# Patient Record
Sex: MALE | URBAN - METROPOLITAN AREA
[De-identification: names, ages, dates, MRNs, and addresses within clinical notes are randomized per-mention and may not be internally consistent; named-entity substitution may affect disease eponyms.]

---

## 2024-06-25 ENCOUNTER — TELEPHONE (OUTPATIENT)
Dept: TRANSPLANT | Facility: CLINIC | Age: 24
End: 2024-06-25
Payer: MEDICAID

## 2024-06-25 DIAGNOSIS — I50.9 CONGESTIVE HEART FAILURE, UNSPECIFIED HF CHRONICITY, UNSPECIFIED HEART FAILURE TYPE: Primary | ICD-10-CM

## 2024-07-17 ENCOUNTER — TELEPHONE (OUTPATIENT)
Dept: TRANSPLANT | Facility: CLINIC | Age: 24
End: 2024-07-17
Payer: MEDICAID

## 2024-07-23 ENCOUNTER — HOSPITAL ENCOUNTER (OUTPATIENT)
Dept: PULMONOLOGY | Facility: CLINIC | Age: 24
Discharge: HOME | End: 2024-07-23
Payer: MEDICAID

## 2024-07-23 ENCOUNTER — OFFICE VISIT (OUTPATIENT)
Dept: TRANSPLANT | Facility: CLINIC | Age: 24
End: 2024-07-23
Payer: MEDICAID

## 2024-07-23 ENCOUNTER — CLINICAL SUPPORT (OUTPATIENT)
Dept: TRANSPLANT | Facility: CLINIC | Age: 24
End: 2024-07-23
Payer: MEDICAID

## 2024-07-23 ENCOUNTER — HOSPITAL ENCOUNTER (OUTPATIENT)
Dept: CARDIOLOGY | Facility: HOSPITAL | Age: 24
Discharge: HOME | End: 2024-07-23
Attending: INTERNAL MEDICINE
Payer: MEDICAID

## 2024-07-23 VITALS — HEIGHT: 70 IN | BODY MASS INDEX: 38.65 KG/M2 | WEIGHT: 270 LBS

## 2024-07-23 VITALS — WEIGHT: 270 LBS | BODY MASS INDEX: 38.65 KG/M2 | HEIGHT: 70 IN

## 2024-07-23 VITALS
HEART RATE: 80 BPM | SYSTOLIC BLOOD PRESSURE: 135 MMHG | WEIGHT: 315 LBS | BODY MASS INDEX: 45.1 KG/M2 | HEIGHT: 70 IN | OXYGEN SATURATION: 99 % | DIASTOLIC BLOOD PRESSURE: 80 MMHG

## 2024-07-23 DIAGNOSIS — I50.9 CONGESTIVE HEART FAILURE, UNSPECIFIED HF CHRONICITY, UNSPECIFIED HEART FAILURE TYPE: ICD-10-CM

## 2024-07-23 DIAGNOSIS — E66.01 MORBID OBESITY: ICD-10-CM

## 2024-07-23 DIAGNOSIS — I50.9 CONGESTIVE HEART FAILURE, UNSPECIFIED HF CHRONICITY, UNSPECIFIED HEART FAILURE TYPE: Primary | ICD-10-CM

## 2024-07-23 DIAGNOSIS — I50.43 ACUTE ON CHRONIC COMBINED SYSTOLIC AND DIASTOLIC CONGESTIVE HEART FAILURE: Primary | ICD-10-CM

## 2024-07-23 LAB
ASCENDING AORTA: 2.97 CM
AV INDEX (PROSTH): 0.7
AV MEAN GRADIENT: 3 MMHG
AV PEAK GRADIENT: 4 MMHG
AV VALVE AREA BY VELOCITY RATIO: 4.8 CM²
AV VALVE AREA: 4.32 CM²
AV VELOCITY RATIO: 0.78
BSA FOR ECHO PROCEDURE: 2.46 M2
CV ECHO LV RWT: 0.25 CM
DOP CALC AO PEAK VEL: 1 M/S
DOP CALC AO VTI: 19.22 CM
DOP CALC LVOT AREA: 6.2 CM2
DOP CALC LVOT DIAMETER: 2.8 CM
DOP CALC LVOT PEAK VEL: 0.78 M/S
DOP CALC LVOT STROKE VOLUME: 83.02 CM3
DOP CALCLVOT PEAK VEL VTI: 13.49 CM
E WAVE DECELERATION TIME: 194.12 MSEC
E/A RATIO: 1.28
E/E' RATIO: 14.6 M/S
ECHO LV POSTERIOR WALL: 0.98 CM (ref 0.6–1.1)
EJECTION FRACTION: 15 %
FRACTIONAL SHORTENING: 9 % (ref 28–44)
INTERVENTRICULAR SEPTUM: 0.81 CM (ref 0.6–1.1)
LA MAJOR: 6.31 CM
LA MINOR: 6.2 CM
LA WIDTH: 5.55 CM
LEFT ATRIUM SIZE: 5.08 CM
LEFT ATRIUM VOLUME INDEX MOD: 57.8 ML/M2
LEFT ATRIUM VOLUME INDEX: 63.2 ML/M2
LEFT ATRIUM VOLUME MOD: 137.04 CM3
LEFT ATRIUM VOLUME: 149.89 CM3
LEFT INTERNAL DIMENSION IN SYSTOLE: 7.2 CM (ref 2.1–4)
LEFT VENTRICLE DIASTOLIC VOLUME INDEX: 142.43 ML/M2
LEFT VENTRICLE DIASTOLIC VOLUME: 337.56 ML
LEFT VENTRICLE MASS INDEX: 148 G/M2
LEFT VENTRICLE SYSTOLIC VOLUME INDEX: 114.9 ML/M2
LEFT VENTRICLE SYSTOLIC VOLUME: 272.43 ML
LEFT VENTRICULAR INTERNAL DIMENSION IN DIASTOLE: 7.93 CM (ref 3.5–6)
LEFT VENTRICULAR MASS: 349.75 G
LV LATERAL E/E' RATIO: 18.25 M/S
LV SEPTAL E/E' RATIO: 12.17 M/S
MV PEAK A VEL: 0.57 M/S
MV PEAK E VEL: 0.73 M/S
PISA TR MAX VEL: 3.22 M/S
RA MAJOR: 5.21 CM
RA PRESSURE ESTIMATED: 3 MMHG
RA WIDTH: 4.52 CM
RIGHT VENTRICLE DIASTOLIC BASEL DIMENSION: 4.5 CM
RV TB RVSP: 6 MMHG
SINUS: 3.68 CM
STJ: 3.02 CM
TDI LATERAL: 0.04 M/S
TDI SEPTAL: 0.06 M/S
TDI: 0.05 M/S
TR MAX PG: 41 MMHG
TRICUSPID ANNULAR PLANE SYSTOLIC EXCURSION: 1.7 CM
TV REST PULMONARY ARTERY PRESSURE: 44 MMHG
Z-SCORE OF LEFT VENTRICULAR DIMENSION IN END DIASTOLE: -2.56
Z-SCORE OF LEFT VENTRICULAR DIMENSION IN END SYSTOLE: 1.25

## 2024-07-23 PROCEDURE — 93306 TTE W/DOPPLER COMPLETE: CPT | Mod: 26,NTX,, | Performed by: INTERNAL MEDICINE

## 2024-07-23 PROCEDURE — 3008F BODY MASS INDEX DOCD: CPT | Mod: CPTII,,, | Performed by: INTERNAL MEDICINE

## 2024-07-23 PROCEDURE — 99999 PR PBB SHADOW E&M-EST. PATIENT-LVL III: CPT | Mod: PBBFAC,TXP,, | Performed by: INTERNAL MEDICINE

## 2024-07-23 PROCEDURE — 99213 OFFICE O/P EST LOW 20 MIN: CPT | Mod: PBBFAC,25,TXP | Performed by: INTERNAL MEDICINE

## 2024-07-23 PROCEDURE — 94618 PULMONARY STRESS TESTING: CPT | Mod: 26,S$PBB,, | Performed by: INTERNAL MEDICINE

## 2024-07-23 PROCEDURE — 1160F RVW MEDS BY RX/DR IN RCRD: CPT | Mod: CPTII,,, | Performed by: INTERNAL MEDICINE

## 2024-07-23 PROCEDURE — 3079F DIAST BP 80-89 MM HG: CPT | Mod: CPTII,,, | Performed by: INTERNAL MEDICINE

## 2024-07-23 PROCEDURE — 4010F ACE/ARB THERAPY RXD/TAKEN: CPT | Mod: CPTII,,, | Performed by: INTERNAL MEDICINE

## 2024-07-23 PROCEDURE — C8929 TTE W OR WO FOL WCON,DOPPLER: HCPCS | Mod: TXP

## 2024-07-23 PROCEDURE — 1159F MED LIST DOCD IN RCRD: CPT | Mod: CPTII,,, | Performed by: INTERNAL MEDICINE

## 2024-07-23 PROCEDURE — 3075F SYST BP GE 130 - 139MM HG: CPT | Mod: CPTII,,, | Performed by: INTERNAL MEDICINE

## 2024-07-23 PROCEDURE — 94618 PULMONARY STRESS TESTING: CPT | Mod: PBBFAC | Performed by: INTERNAL MEDICINE

## 2024-07-23 PROCEDURE — 25500020 PHARM REV CODE 255: Mod: TXP | Performed by: INTERNAL MEDICINE

## 2024-07-23 PROCEDURE — 99214 OFFICE O/P EST MOD 30 MIN: CPT | Mod: S$PBB,,, | Performed by: INTERNAL MEDICINE

## 2024-07-23 RX ORDER — TRAZODONE HYDROCHLORIDE 50 MG/1
50 TABLET ORAL NIGHTLY PRN
COMMUNITY

## 2024-07-23 RX ORDER — POTASSIUM CHLORIDE 750 MG/1
10 TABLET, EXTENDED RELEASE ORAL DAILY
COMMUNITY
Start: 2024-02-08

## 2024-07-23 RX ORDER — BENZONATATE 100 MG/1
100 CAPSULE ORAL 3 TIMES DAILY PRN
COMMUNITY
Start: 2024-02-01

## 2024-07-23 RX ORDER — AMLODIPINE BESYLATE 10 MG/1
10 TABLET ORAL DAILY
COMMUNITY
Start: 2024-06-24

## 2024-07-23 RX ORDER — NITROGLYCERIN 0.4 MG/1
0.4 TABLET SUBLINGUAL EVERY 5 MIN PRN
COMMUNITY
Start: 2024-06-24

## 2024-07-23 RX ORDER — DAPAGLIFLOZIN 10 MG/1
10 TABLET, FILM COATED ORAL DAILY
COMMUNITY

## 2024-07-23 RX ORDER — SACUBITRIL AND VALSARTAN 24; 26 MG/1; MG/1
1 TABLET, FILM COATED ORAL 2 TIMES DAILY
COMMUNITY
Start: 2024-02-08

## 2024-07-23 RX ADMIN — HUMAN ALBUMIN MICROSPHERES AND PERFLUTREN 0.66 MG: 10; .22 INJECTION, SOLUTION INTRAVENOUS at 01:07

## 2024-07-23 NOTE — PROGRESS NOTES
Procedure explained. 22 g sl started in left forearm for optison use. Optison given ivp via sl for imaging by [facility]. Tolerated well. Sl d/frankie after. Pressure applied.

## 2024-07-23 NOTE — PROGRESS NOTES
At Dr Banks's request, met with pt briefly in clinic to provide educational packets for heart transplant and lvad.   Per MD pt is not currently a candidate for advanced options due to BMI 50.  Pt currently follows with DR Gee García and is not on full GDMT.   He will follow in HF section for now.

## 2024-07-23 NOTE — PROCEDURES
Michael Conti is a 24 y.o.  male patient, who presents for a 6 minute walk test ordered by MD Jocelyn.  The diagnosis is Cardiomyopathy; Congestive Heart Failure.  The patient's BMI is 38.7 kg/m2.  Predicted distance (lower limit of normal) is 603.33 meters.      Test Results:    The test was completed without stopping.  The total time walked was 360 seconds.  During walking, the patient reported:  Dyspnea.  The patient used no assistive devices during testing.     07/23/2024---------Distance: 365.76 meters (1200 feet)     O2 Sat % Supplemental Oxygen Heart Rate Blood Pressure Alexa Scale   Pre-exercise  (Resting) 97 % Room Air 91 bpm 145/98 mmHg 3   During Exercise 96 % Room Air 125 bpm 169/110 mmHg 5-6   Post-exercise  (Recovery) 97 % Room Air  95 bpm 149/98 mmHg      Recovery Time: 90 seconds    Performing nurse/tech: Estopinal RRT      PREVIOUS STUDY:   The patient has not had a previous study.      CLINICAL INTERPRETATION:  Six minute walk distance is 365.76 meters (1200 feet) with heavy dyspnea.  During exercise, there was no significant desaturation while breathing room air.  Both blood pressure and heart rate increased significantly with walking.  Hypertension was present prior to exercise.  The patient did not report non-pulmonary symptoms during exercise.  No previous study performed.  Based upon age and body mass index, exercise capacity is less than predicted.

## 2024-07-23 NOTE — LETTER
July 23, 2024        Victorino Reyes  [address] [place] [address]  [place] [address]  Phone: [phone]  Fax: [phone]             [facility]  [address]  [facility] [address]  Phone: [phone]   Patient: Michael Conti   MR Number: [id number]   YOB: 2000   Date of Visit: 7/23/2024       Dear Dr. Victorino Reyes    Thank you for referring Michael Conti to me for evaluation. Attached you will find relevant portions of my assessment and plan of care.    If you have questions, please do not hesitate to call me. I look forward to following Michael Conti along with you.    Sincerely,    Sherice Banks MD    Enclosure    If you would like to receive this communication electronically, please contact [email or web address] or [phone] to request [employer] access.    [employer] is a tool which provides read-only access to select patient information with whom you have a relationship. Its easy to use and provides real time access to review your patients record including encounter summaries, notes, results, and demographic information.    If you feel you have received this communication in error or would no longer like to receive these types of communications, please e-mail [email or web address]

## 2024-07-23 NOTE — PROGRESS NOTES
Subjective:   Initial evaluation of advanced heart failure for advanced options.     HPI:  Mr. Conti is a 24 y.o. year old Black or  male who has presents to be considered for advanced surgical options (LVAD/OHT).  Mr. Conti is referred to us for what we believe is advanced options.    Patient states he was diagnosed with decompensated heart failure approximately 4 years ago, etiology unknown to him. He has had a few admissions over the last year, states he just left the hospital last Thursday due to ADH F, spent 2 days in the hospital.  We unfortunately do not have very many records, there is 1 clinic note from June that states he was up titrated on his Entresto to 97/103, and was still having increased blood pressures.  He states his Entresto was decreased to the lowest dose while in the hospital, 24/26.  Additionally looking at his GDM MT he is currently on Entresto 24/26, Farxiga 10 mg, not on spironolactone nor on a beta-blocker.  He states he sleeps on 1 pillow however then admits that he sleeps in a recliner.  Positive shortness of breath, no leg swelling, main issue remains volume and shortness of breath with exertion.      6 minute walk test today he walked 1200 ft, with no desaturation.   Of note, blood pressure during the 6 minute walk test was quite elevated, starting at 1:45 a.m. systolic and increasing to 169 systolic during exercise.    07/23/2024---------Distance: 365.76 meters (1200 feet)       O2 Sat % Supplemental Oxygen Heart Rate Blood Pressure Alexa Scale   Pre-exercise  (Resting) 97 % Room Air 91 bpm 145/98 mmHg 3   During Exercise 96 % Room Air 125 bpm 169/110 mmHg 5-6   Post-exercise  (Recovery) 97 % Room Air  95 bpm 149/98 mmHg        Recovery Time: 90 seconds     No significant other medical history  No family history of heart failure per patient.  No significant surgical history.    Social history: negative for tobacco, illicits, ETOH. Not working.          Review of  "Systems   Constitutional: Positive for weight gain. Negative for chills, decreased appetite, diaphoresis, fever and malaise/fatigue.   HENT:  Negative for congestion.    Eyes:  Negative for blurred vision and visual disturbance.   Cardiovascular:  Positive for dyspnea on exertion and orthopnea (sleeps in recliner chair). Negative for chest pain, irregular heartbeat, leg swelling, near-syncope, palpitations, paroxysmal nocturnal dyspnea and syncope.   Respiratory:  Positive for sleep disturbances due to breathing and snoring. Negative for cough, shortness of breath and wheezing.    Hematologic/Lymphatic: Negative for bleeding problem. Does not bruise/bleed easily.   Skin:  Negative for poor wound healing and rash.   Musculoskeletal:  Negative for arthritis, joint pain and muscle weakness.   Gastrointestinal:  Negative for bloating, abdominal pain, anorexia, constipation, diarrhea, hematemesis, hematochezia, melena, nausea and vomiting.   Genitourinary:  Negative for frequency and urgency.   Neurological:  Negative for difficulty with concentration, excessive daytime sleepiness, dizziness, headaches, light-headedness and weakness.   Psychiatric/Behavioral:  Negative for depression. The patient does not have insomnia and is not nervous/anxious.        Objective:   Blood pressure 135/80, pulse 80, height 5' 10" (1.778 m), weight (!) 160.3 kg (353 lb 6.4 oz), SpO2 99%.body mass index is 50.71 kg/m².    Physical Exam  Vitals and nursing note reviewed.   Constitutional:       General: He is not in acute distress.     Appearance: He is well-developed. He is not diaphoretic.   HENT:      Head: Normocephalic and atraumatic.   Eyes:      General:         Right eye: No discharge.         Left eye: No discharge.      Conjunctiva/sclera: Conjunctivae normal.   Neck:      Vascular: No JVD.   Cardiovascular:      Rate and Rhythm: Normal rate and regular rhythm.      Heart sounds: No murmur heard.     No friction rub. No gallop. " "  Pulmonary:      Effort: Pulmonary effort is normal.      Breath sounds: Normal breath sounds. No wheezing or rales.   Chest:      Chest wall: No tenderness.   Abdominal:      General: Bowel sounds are normal. There is no distension.      Palpations: Abdomen is soft. There is no mass.      Tenderness: There is no abdominal tenderness. There is no guarding or rebound.   Musculoskeletal:         General: No tenderness. Normal range of motion.      Cervical back: Normal range of motion and neck supple.   Skin:     General: Skin is warm and dry.      Findings: No erythema or rash.   Neurological:      Mental Status: He is alert and oriented to person, place, and time.   Psychiatric:         Behavior: Behavior normal.         Thought Content: Thought content normal.         Judgment: Judgment normal.         Labs:      Chemistry        Component Value Date/Time     07/23/2024 1138    K 4.1 07/23/2024 1138     07/23/2024 1138    CO2 23 07/23/2024 1138    BUN 25 (H) 07/23/2024 1138    CREATININE 1.4 07/23/2024 1138    GLU 80 07/23/2024 1138        Component Value Date/Time    CALCIUM 8.6 (L) 07/23/2024 1138    ALKPHOS 109 07/23/2024 1138    AST 18 07/23/2024 1138    ALT 23 07/23/2024 1138    BILITOT 0.3 07/23/2024 1138          No results found for: "MG"  Lab Results   Component Value Date    WBC 10.04 07/23/2024    HGB 15.6 07/23/2024    HCT 50.1 07/23/2024    MCV 87 07/23/2024     07/23/2024     BNP   Date Value Ref Range Status   07/23/2024 409 (H) 0 - 99 pg/mL Final     Comment:     Values of less than 100 pg/ml are consistent with non-CHF populations.     No results found for this or any previous visit.      Labs were reviewed with the patient.    Assessment:      1. Acute on chronic combined systolic and diastolic congestive heart failure    2. Morbid obesity        Plan:     During the visit today, patient seemed a little bit more concerned with  making sure his transportation was there for him " to leave.  We discussed the fact that his blood pressure is quite elevated today and likely has room to up titrate his GDM MT again.  If unable to do Entresto, without records from the last hospitalization from last week, would strongly recommend Bidil as an alternative if tolerable.      Regarding candidacy for LVAD and OHT, unfortunately, patient's BMI is  over 50 today.  On exam it is difficult to assess volume, however unlikely to bring his BMI down to transplant (BMI 35 or below).      To help his heart failure management, we are able to consider right heart catheterization and CPX, however would much prefer that he be up titrated on his GDM MT. If there are issues with GDM MT up titration, would like to get more records were discussed with his primary team.  Have reached out to Dr. Xie to see if he is familiar with the patient and if not, to see if the patient could see him in his clinic.    Patient is now NYHA II ACC stage C  Recommend 2 gram sodium restriction and 1500cc fluid restriction.  Encourage physical activity with graded exercise program.  Requested patient to weigh themselves daily, and to notify us if their weight increases by more than 3 lbs in 1 day or 5 lbs in 1 week.     Transplant Candidacy: Patient is a 24 y.o. year old male with heart failure is being seen for possible LVAD. In my opinion, he is  an unacceptable LVAD candidate. Patient did meet with MCS and/or pre-transplant coordinator at the end of this visit for workup. he is scheduled for medical optimization prior to risk stratification. The patient will follow up with CHF.    Thank you for allowing us to participate in the cardiovascular management of this patient. We look forward to partnering in their care. Please contact us with any questions or concerns you may have regarding this patient.     Patient and family acknowledged receipt of this information and all questions were answered.     In my opinion, the patient is an  unsuitable LVAD and OHT candidate due to following BMI > 45. The transplant encounter will be closed. The patient will be following in the heart failure section. However would strongly prefer he followup with his local team as we are difficult for him to come to with transportation and may not be able to maintain this. Patient's caregiver was not present this visit.       Patient advised that it is recommended that all transplant candidates, and their close contacts and household members receive Covid vaccination.         Advance Care Planning     Date: 07/23/2024    Power of   I initiated the process of voluntary advance care planning today and explained the importance of this process to the patient.  I introduced the concept of advance directives to the patient, as well. Then the patient received detailed information about the importance of designating a Health Care Power of  (HCPOA). He was also instructed to communicate with this person about their wishes for future healthcare, should he become sick and lose decision-making capacity. The patient has not previously appointed a HCPOA. After our discussion, the patient has not decided to complete a HCPOA and has decided to not fill out today. We talked about it beign important and that he may benefit from having a caregiver at his next visit if he returns. Acknowledged receipt of this information. ,       A total of 5 min was spent on advance care planning, goals of care discussion, emotional support, formulating and communicating prognosis and exploring burden/benefit of various approaches of treatment. This discussion occurred on a fully voluntary basis with the verbal consent of the patient and/or family.       Sherice Banks MD

## 2024-07-25 ENCOUNTER — TELEPHONE (OUTPATIENT)
Dept: TRANSPLANT | Facility: CLINIC | Age: 24
End: 2024-07-25
Payer: MEDICAID

## 2024-07-25 NOTE — TELEPHONE ENCOUNTER
Care of patient is being transferred to CHF section from Preht section, per Dr. Banks.    Dx:NICM  Reason: BMI>45  Pt is not on home inotrope therapy.      Outstanding orders scheduled:    Future Appointments   Date Time Provider Department Center   9/11/2024  9:50 AM LAB, APPOINTMENT [place] [facility]   9/11/2024 10:30 AM CPX [facility]   [facility]11[facility]2024 11[facility]

## 2024-08-12 ENCOUNTER — TELEPHONE (OUTPATIENT)
Dept: TRANSPLANT | Facility: CLINIC | Age: 24
End: 2024-08-12
Payer: MEDICAID

## 2024-09-10 ENCOUNTER — TELEPHONE (OUTPATIENT)
Dept: CARDIOLOGY | Facility: HOSPITAL | Age: 24
End: 2024-09-10
Payer: MEDICAID

## 2024-09-10 NOTE — TELEPHONE ENCOUNTER
Called to notify of rescheduling for CPX due to anticipated weather on 9/11. Ordering provider office to reschedule at a future date.

## 2024-09-12 ENCOUNTER — HOSPITAL ENCOUNTER (INPATIENT)
Facility: HOSPITAL | Age: 24
LOS: 2 days | Discharge: HOME | DRG: 280 | End: 2024-09-14
Attending: EMERGENCY MEDICINE | Admitting: INTERNAL MEDICINE
Payer: MEDICAID

## 2024-09-12 DIAGNOSIS — R06.02 SHORTNESS OF BREATH: ICD-10-CM

## 2024-09-12 DIAGNOSIS — R07.9 CHEST PAIN: Primary | ICD-10-CM

## 2024-09-12 DIAGNOSIS — R94.31 PROLONGED Q-T INTERVAL ON ECG: ICD-10-CM

## 2024-09-12 DIAGNOSIS — I50.9 CHF EXACERBATION: ICD-10-CM

## 2024-09-12 DIAGNOSIS — I50.9 CHF (CONGESTIVE HEART FAILURE): ICD-10-CM

## 2024-09-12 LAB
ALBUMIN SERPL-MCNC: 3.5 G/DL (ref 3.5–5)
ALBUMIN/GLOB SERPL: 1.1 RATIO (ref 1.1–2)
ALP SERPL-CCNC: 89 UNIT/L (ref 40–150)
ALT SERPL-CCNC: 31 UNIT/L (ref 0–55)
ANION GAP SERPL CALC-SCNC: 11 MEQ/L
AST SERPL-CCNC: 29 UNIT/L (ref 5–34)
BASOPHILS # BLD AUTO: 0.04 X10(3)/MCL
BASOPHILS NFR BLD AUTO: 0.4 %
BILIRUB SERPL-MCNC: 0.7 MG/DL
BNP BLD-MCNC: 3367.3 PG/ML
BUN SERPL-MCNC: 22.8 MG/DL (ref 8.9–20.6)
CALCIUM SERPL-MCNC: 9.2 MG/DL (ref 8.4–10.2)
CHLORIDE SERPL-SCNC: 108 MMOL/L (ref 98–107)
CO2 SERPL-SCNC: 22 MMOL/L (ref 22–29)
CREAT SERPL-MCNC: 2.05 MG/DL (ref 0.73–1.18)
CREAT/UREA NIT SERPL: 11
EOSINOPHIL # BLD AUTO: 0.03 X10(3)/MCL (ref 0–0.9)
EOSINOPHIL NFR BLD AUTO: 0.3 %
ERYTHROCYTE [DISTWIDTH] IN BLOOD BY AUTOMATED COUNT: 15.3 % (ref 11.5–17)
GFR SERPLBLD CREATININE-BSD FMLA CKD-EPI: 46 ML/MIN/1.73/M2
GLOBULIN SER-MCNC: 3.3 GM/DL (ref 2.4–3.5)
GLUCOSE SERPL-MCNC: 92 MG/DL (ref 74–100)
HCT VFR BLD AUTO: 42.2 % (ref 42–52)
HGB BLD-MCNC: 13.6 G/DL (ref 14–18)
IMM GRANULOCYTES # BLD AUTO: 0.05 X10(3)/MCL (ref 0–0.04)
IMM GRANULOCYTES NFR BLD AUTO: 0.5 %
LIPASE SERPL-CCNC: 14 U/L
LYMPHOCYTES # BLD AUTO: 2.77 X10(3)/MCL (ref 0.6–4.6)
LYMPHOCYTES NFR BLD AUTO: 25.6 %
MCH RBC QN AUTO: 27.1 PG (ref 27–31)
MCHC RBC AUTO-ENTMCNC: 32.2 G/DL (ref 33–36)
MCV RBC AUTO: 84.1 FL (ref 80–94)
MONOCYTES # BLD AUTO: 0.82 X10(3)/MCL (ref 0.1–1.3)
MONOCYTES NFR BLD AUTO: 7.6 %
NEUTROPHILS # BLD AUTO: 7.1 X10(3)/MCL (ref 2.1–9.2)
NEUTROPHILS NFR BLD AUTO: 65.6 %
NRBC BLD AUTO-RTO: 0 %
OHS QRS DURATION: 90 MS
OHS QTC CALCULATION: 526 MS
PLATELET # BLD AUTO: 243 X10(3)/MCL (ref 130–400)
PMV BLD AUTO: 9.8 FL (ref 7.4–10.4)
POTASSIUM SERPL-SCNC: 4.2 MMOL/L (ref 3.5–5.1)
PROT SERPL-MCNC: 6.8 GM/DL (ref 6.4–8.3)
RBC # BLD AUTO: 5.02 X10(6)/MCL (ref 4.7–6.1)
SODIUM SERPL-SCNC: 141 MMOL/L (ref 136–145)
TROPONIN I SERPL-MCNC: 0.23 NG/ML (ref 0–0.04)
WBC # BLD AUTO: 10.81 X10(3)/MCL (ref 4.5–11.5)

## 2024-09-12 PROCEDURE — 84484 ASSAY OF TROPONIN QUANT: CPT | Performed by: EMERGENCY MEDICINE

## 2024-09-12 PROCEDURE — 83880 ASSAY OF NATRIURETIC PEPTIDE: CPT | Performed by: EMERGENCY MEDICINE

## 2024-09-12 PROCEDURE — 11000001 HC ACUTE MED/SURG PRIVATE ROOM

## 2024-09-12 PROCEDURE — 63600175 PHARM REV CODE 636 W HCPCS: Performed by: EMERGENCY MEDICINE

## 2024-09-12 PROCEDURE — 80053 COMPREHEN METABOLIC PANEL: CPT | Performed by: EMERGENCY MEDICINE

## 2024-09-12 PROCEDURE — 25000003 PHARM REV CODE 250: Performed by: EMERGENCY MEDICINE

## 2024-09-12 PROCEDURE — 21400001 HC TELEMETRY ROOM

## 2024-09-12 PROCEDURE — 96374 THER/PROPH/DIAG INJ IV PUSH: CPT

## 2024-09-12 PROCEDURE — 85025 COMPLETE CBC W/AUTO DIFF WBC: CPT | Performed by: EMERGENCY MEDICINE

## 2024-09-12 PROCEDURE — 93010 ELECTROCARDIOGRAM REPORT: CPT | Mod: ,,, | Performed by: INTERNAL MEDICINE

## 2024-09-12 PROCEDURE — 93005 ELECTROCARDIOGRAM TRACING: CPT

## 2024-09-12 PROCEDURE — 83690 ASSAY OF LIPASE: CPT | Performed by: EMERGENCY MEDICINE

## 2024-09-12 PROCEDURE — 99285 EMERGENCY DEPT VISIT HI MDM: CPT | Mod: 25

## 2024-09-12 RX ORDER — ASPIRIN 325 MG
325 TABLET, DELAYED RELEASE (ENTERIC COATED) ORAL
Status: COMPLETED | OUTPATIENT
Start: 2024-09-12 | End: 2024-09-12

## 2024-09-12 RX ORDER — HYDRALAZINE HYDROCHLORIDE 20 MG/ML
10 INJECTION INTRAMUSCULAR; INTRAVENOUS
Status: DISCONTINUED | OUTPATIENT
Start: 2024-09-12 | End: 2024-09-14 | Stop reason: HOSPADM

## 2024-09-12 RX ORDER — FUROSEMIDE 10 MG/ML
60 INJECTION INTRAMUSCULAR; INTRAVENOUS
Status: COMPLETED | OUTPATIENT
Start: 2024-09-12 | End: 2024-09-12

## 2024-09-12 RX ADMIN — ASPIRIN 325 MG: 325 TABLET, COATED ORAL at 09:09

## 2024-09-12 RX ADMIN — ENOXAPARIN SODIUM 170 MG: 120 INJECTION SUBCUTANEOUS at 11:09

## 2024-09-12 RX ADMIN — FUROSEMIDE 60 MG: 10 INJECTION, SOLUTION INTRAMUSCULAR; INTRAVENOUS at 09:09

## 2024-09-13 LAB
ALBUMIN SERPL-MCNC: 3.7 G/DL (ref 3.5–5)
ALBUMIN/GLOB SERPL: 1 RATIO (ref 1.1–2)
ALP SERPL-CCNC: 89 UNIT/L (ref 40–150)
ALT SERPL-CCNC: 30 UNIT/L (ref 0–55)
ANION GAP SERPL CALC-SCNC: 10 MEQ/L
APICAL FOUR CHAMBER EJECTION FRACTION: 22 %
APICAL TWO CHAMBER EJECTION FRACTION: 14 %
AST SERPL-CCNC: 32 UNIT/L (ref 5–34)
AV INDEX (PROSTH): 0.36
AV MEAN GRADIENT: 2 MMHG
AV PEAK GRADIENT: 3 MMHG
AV VALVE AREA BY VELOCITY RATIO: 2.12 CM²
AV VALVE AREA: 1.89 CM²
AV VELOCITY RATIO: 0.4
BASOPHILS # BLD AUTO: 0.06 X10(3)/MCL
BASOPHILS NFR BLD AUTO: 0.6 %
BILIRUB SERPL-MCNC: 0.6 MG/DL
BSA FOR ECHO PROCEDURE: 2.88 M2
BUN SERPL-MCNC: 25.8 MG/DL (ref 8.9–20.6)
CALCIUM SERPL-MCNC: 9.1 MG/DL (ref 8.4–10.2)
CHLORIDE SERPL-SCNC: 106 MMOL/L (ref 98–107)
CO2 SERPL-SCNC: 23 MMOL/L (ref 22–29)
CREAT SERPL-MCNC: 1.92 MG/DL (ref 0.73–1.18)
CREAT/UREA NIT SERPL: 13
CV ECHO LV RWT: 0.45 CM
DOP CALC AO PEAK VEL: 0.85 M/S
DOP CALC AO VTI: 14.3 CM
DOP CALC LVOT AREA: 5.3 CM2
DOP CALC LVOT DIAMETER: 2.6 CM
DOP CALC LVOT PEAK VEL: 0.34 M/S
DOP CALC LVOT STROKE VOLUME: 27.06 CM3
DOP CALC MV VTI: 17.7 CM
DOP CALCLVOT PEAK VEL VTI: 5.1 CM
E WAVE DECELERATION TIME: 128 MSEC
E/A RATIO: 1.67
E/E' RATIO: 22 M/S
ECHO LV POSTERIOR WALL: 1.47 CM (ref 0.6–1.1)
EOSINOPHIL # BLD AUTO: 0.03 X10(3)/MCL (ref 0–0.9)
EOSINOPHIL NFR BLD AUTO: 0.3 %
ERYTHROCYTE [DISTWIDTH] IN BLOOD BY AUTOMATED COUNT: 15 % (ref 11.5–17)
FRACTIONAL SHORTENING: 6 % (ref 28–44)
GFR SERPLBLD CREATININE-BSD FMLA CKD-EPI: 49 ML/MIN/1.73/M2
GLOBULIN SER-MCNC: 3.6 GM/DL (ref 2.4–3.5)
GLUCOSE SERPL-MCNC: 92 MG/DL (ref 74–100)
HCT VFR BLD AUTO: 43.5 % (ref 42–52)
HGB BLD-MCNC: 13.8 G/DL (ref 14–18)
IMM GRANULOCYTES # BLD AUTO: 0.04 X10(3)/MCL (ref 0–0.04)
IMM GRANULOCYTES NFR BLD AUTO: 0.4 %
INTERVENTRICULAR SEPTUM: 1.42 CM (ref 0.6–1.1)
LACTATE SERPL-SCNC: 1.6 MMOL/L (ref 0.5–2.2)
LEFT ATRIUM AREA SYSTOLIC (APICAL 2 CHAMBER): 34.4 CM2
LEFT ATRIUM AREA SYSTOLIC (APICAL 4 CHAMBER): 26.3 CM2
LEFT ATRIUM SIZE: 5.8 CM
LEFT ATRIUM VOLUME INDEX MOD: 37.3 ML/M2
LEFT ATRIUM VOLUME MOD: 101 CM3
LEFT INTERNAL DIMENSION IN SYSTOLE: 6.1 CM (ref 2.1–4)
LEFT VENTRICLE DIASTOLIC VOLUME INDEX: 80.26 ML/M2
LEFT VENTRICLE DIASTOLIC VOLUME: 217.51 ML
LEFT VENTRICLE END DIASTOLIC VOLUME APICAL 2 CHAMBER: 308 ML
LEFT VENTRICLE END DIASTOLIC VOLUME APICAL 4 CHAMBER: 344 ML
LEFT VENTRICLE END SYSTOLIC VOLUME APICAL 2 CHAMBER: 132 ML
LEFT VENTRICLE END SYSTOLIC VOLUME APICAL 4 CHAMBER: 76.3 ML
LEFT VENTRICLE MASS INDEX: 171 G/M2
LEFT VENTRICLE SYSTOLIC VOLUME INDEX: 69 ML/M2
LEFT VENTRICLE SYSTOLIC VOLUME: 186.93 ML
LEFT VENTRICULAR INTERNAL DIMENSION IN DIASTOLE: 6.52 CM (ref 3.5–6)
LEFT VENTRICULAR MASS: 463.25 G
LV LATERAL E/E' RATIO: 15.4 M/S
LV SEPTAL E/E' RATIO: 38.5 M/S
LVED V (TEICH): 217.51 ML
LVES V (TEICH): 186.93 ML
LVOT MG: 0 MMHG
LVOT MV: 0.22 CM/S
LYMPHOCYTES # BLD AUTO: 3 X10(3)/MCL (ref 0.6–4.6)
LYMPHOCYTES NFR BLD AUTO: 29.4 %
MAGNESIUM SERPL-MCNC: 2 MG/DL (ref 1.6–2.6)
MCH RBC QN AUTO: 27 PG (ref 27–31)
MCHC RBC AUTO-ENTMCNC: 31.7 G/DL (ref 33–36)
MCV RBC AUTO: 85 FL (ref 80–94)
MONOCYTES # BLD AUTO: 0.73 X10(3)/MCL (ref 0.1–1.3)
MONOCYTES NFR BLD AUTO: 7.1 %
MV MEAN GRADIENT: 3 MMHG
MV PEAK A VEL: 0.46 M/S
MV PEAK E VEL: 0.77 M/S
MV PEAK GRADIENT: 4 MMHG
MV STENOSIS PRESSURE HALF TIME: 67 MS
MV VALVE AREA BY CONTINUITY EQUATION: 1.53 CM2
MV VALVE AREA P 1/2 METHOD: 3.28 CM2
NEUTROPHILS # BLD AUTO: 6.36 X10(3)/MCL (ref 2.1–9.2)
NEUTROPHILS NFR BLD AUTO: 62.2 %
NRBC BLD AUTO-RTO: 0 %
OHS CV RV/LV RATIO: 0.53 CM
OHS LV EJECTION FRACTION SIMPSONS BIPLANE MOD: 18 %
OHS QRS DURATION: 86 MS
OHS QRS DURATION: 94 MS
OHS QTC CALCULATION: 500 MS
OHS QTC CALCULATION: 513 MS
PHOSPHATE SERPL-MCNC: 5.5 MG/DL (ref 2.3–4.7)
PISA TR MAX VEL: 2.25 M/S
PLATELET # BLD AUTO: 269 X10(3)/MCL (ref 130–400)
PMV BLD AUTO: 9.9 FL (ref 7.4–10.4)
POTASSIUM SERPL-SCNC: 4.6 MMOL/L (ref 3.5–5.1)
PROT SERPL-MCNC: 7.3 GM/DL (ref 6.4–8.3)
PV PEAK GRADIENT: 1 MMHG
PV PEAK VELOCITY: 0.58 M/S
RA PRESSURE ESTIMATED: 15 MMHG
RBC # BLD AUTO: 5.12 X10(6)/MCL (ref 4.7–6.1)
RIGHT VENTRICULAR END-DIASTOLIC DIMENSION: 3.43 CM
RV TB RVSP: 17 MMHG
SODIUM SERPL-SCNC: 139 MMOL/L (ref 136–145)
TDI LATERAL: 0.05 M/S
TDI SEPTAL: 0.02 M/S
TDI: 0.04 M/S
TR MAX PG: 20 MMHG
TRICUSPID ANNULAR PLANE SYSTOLIC EXCURSION: 1.6 CM
TROPONIN I SERPL-MCNC: 0.56 NG/ML (ref 0–0.04)
TROPONIN I SERPL-MCNC: 0.6 NG/ML (ref 0–0.04)
TROPONIN I SERPL-MCNC: 0.67 NG/ML (ref 0–0.04)
TV REST PULMONARY ARTERY PRESSURE: 35 MMHG
WBC # BLD AUTO: 10.22 X10(3)/MCL (ref 4.5–11.5)
Z-SCORE OF LEFT VENTRICULAR DIMENSION IN END DIASTOLE: -14.02
Z-SCORE OF LEFT VENTRICULAR DIMENSION IN END SYSTOLE: -7.29

## 2024-09-13 PROCEDURE — 83605 ASSAY OF LACTIC ACID: CPT

## 2024-09-13 PROCEDURE — 80053 COMPREHEN METABOLIC PANEL: CPT | Performed by: NURSE PRACTITIONER

## 2024-09-13 PROCEDURE — 36415 COLL VENOUS BLD VENIPUNCTURE: CPT

## 2024-09-13 PROCEDURE — 83735 ASSAY OF MAGNESIUM: CPT | Performed by: NURSE PRACTITIONER

## 2024-09-13 PROCEDURE — 36415 COLL VENOUS BLD VENIPUNCTURE: CPT | Performed by: NURSE PRACTITIONER

## 2024-09-13 PROCEDURE — 84100 ASSAY OF PHOSPHORUS: CPT | Performed by: NURSE PRACTITIONER

## 2024-09-13 PROCEDURE — 93005 ELECTROCARDIOGRAM TRACING: CPT

## 2024-09-13 PROCEDURE — 93010 ELECTROCARDIOGRAM REPORT: CPT | Mod: 59,,, | Performed by: STUDENT IN AN ORGANIZED HEALTH CARE EDUCATION/TRAINING PROGRAM

## 2024-09-13 PROCEDURE — 85025 COMPLETE CBC W/AUTO DIFF WBC: CPT | Performed by: NURSE PRACTITIONER

## 2024-09-13 PROCEDURE — 21400001 HC TELEMETRY ROOM

## 2024-09-13 PROCEDURE — 63600175 PHARM REV CODE 636 W HCPCS: Performed by: NURSE PRACTITIONER

## 2024-09-13 PROCEDURE — 84484 ASSAY OF TROPONIN QUANT: CPT | Performed by: NURSE PRACTITIONER

## 2024-09-13 PROCEDURE — 25000003 PHARM REV CODE 250: Performed by: INTERNAL MEDICINE

## 2024-09-13 PROCEDURE — 63600175 PHARM REV CODE 636 W HCPCS: Performed by: INTERNAL MEDICINE

## 2024-09-13 PROCEDURE — 25000003 PHARM REV CODE 250

## 2024-09-13 PROCEDURE — 93010 ELECTROCARDIOGRAM REPORT: CPT | Mod: ,,, | Performed by: STUDENT IN AN ORGANIZED HEALTH CARE EDUCATION/TRAINING PROGRAM

## 2024-09-13 RX ORDER — NITROGLYCERIN 0.4 MG/1
0.4 TABLET SUBLINGUAL EVERY 5 MIN PRN
Status: DISCONTINUED | OUTPATIENT
Start: 2024-09-13 | End: 2024-09-14 | Stop reason: HOSPADM

## 2024-09-13 RX ORDER — ENOXAPARIN SODIUM 100 MG/ML
40 INJECTION SUBCUTANEOUS EVERY 12 HOURS
Status: DISCONTINUED | OUTPATIENT
Start: 2024-09-13 | End: 2024-09-14 | Stop reason: HOSPADM

## 2024-09-13 RX ORDER — PROCHLORPERAZINE EDISYLATE 5 MG/ML
5 INJECTION INTRAMUSCULAR; INTRAVENOUS EVERY 6 HOURS PRN
Status: DISCONTINUED | OUTPATIENT
Start: 2024-09-13 | End: 2024-09-14 | Stop reason: HOSPADM

## 2024-09-13 RX ORDER — NALOXONE HCL 0.4 MG/ML
0.02 VIAL (ML) INJECTION
Status: DISCONTINUED | OUTPATIENT
Start: 2024-09-13 | End: 2024-09-14 | Stop reason: HOSPADM

## 2024-09-13 RX ORDER — BENZONATATE 100 MG/1
100 CAPSULE ORAL 3 TIMES DAILY PRN
Status: DISCONTINUED | OUTPATIENT
Start: 2024-09-13 | End: 2024-09-14 | Stop reason: HOSPADM

## 2024-09-13 RX ORDER — ONDANSETRON HYDROCHLORIDE 2 MG/ML
4 INJECTION, SOLUTION INTRAVENOUS EVERY 4 HOURS PRN
Status: DISCONTINUED | OUTPATIENT
Start: 2024-09-13 | End: 2024-09-14 | Stop reason: HOSPADM

## 2024-09-13 RX ORDER — SIMETHICONE 80 MG
1 TABLET,CHEWABLE ORAL 4 TIMES DAILY PRN
Status: DISCONTINUED | OUTPATIENT
Start: 2024-09-13 | End: 2024-09-14 | Stop reason: HOSPADM

## 2024-09-13 RX ORDER — METOPROLOL SUCCINATE 50 MG/1
100 TABLET, EXTENDED RELEASE ORAL 2 TIMES DAILY
Status: DISCONTINUED | OUTPATIENT
Start: 2024-09-13 | End: 2024-09-14 | Stop reason: HOSPADM

## 2024-09-13 RX ORDER — FUROSEMIDE 10 MG/ML
60 INJECTION INTRAMUSCULAR; INTRAVENOUS
Status: DISCONTINUED | OUTPATIENT
Start: 2024-09-13 | End: 2024-09-14 | Stop reason: HOSPADM

## 2024-09-13 RX ORDER — TRAZODONE HYDROCHLORIDE 50 MG/1
50 TABLET ORAL NIGHTLY PRN
Status: DISCONTINUED | OUTPATIENT
Start: 2024-09-13 | End: 2024-09-14 | Stop reason: HOSPADM

## 2024-09-13 RX ORDER — AMLODIPINE BESYLATE 5 MG/1
10 TABLET ORAL DAILY
Status: DISCONTINUED | OUTPATIENT
Start: 2024-09-13 | End: 2024-09-14 | Stop reason: HOSPADM

## 2024-09-13 RX ORDER — ACETAMINOPHEN 325 MG/1
650 TABLET ORAL EVERY 6 HOURS PRN
Status: DISCONTINUED | OUTPATIENT
Start: 2024-09-13 | End: 2024-09-14 | Stop reason: HOSPADM

## 2024-09-13 RX ORDER — POLYETHYLENE GLYCOL 3350 17 G/17G
17 POWDER, FOR SOLUTION ORAL 2 TIMES DAILY PRN
Status: DISCONTINUED | OUTPATIENT
Start: 2024-09-13 | End: 2024-09-14 | Stop reason: HOSPADM

## 2024-09-13 RX ORDER — TALC
6 POWDER (GRAM) TOPICAL NIGHTLY PRN
Status: DISCONTINUED | OUTPATIENT
Start: 2024-09-13 | End: 2024-09-14 | Stop reason: HOSPADM

## 2024-09-13 RX ORDER — DAPAGLIFLOZIN 10 MG/1
10 TABLET, FILM COATED ORAL DAILY
Status: DISCONTINUED | OUTPATIENT
Start: 2024-09-13 | End: 2024-09-14 | Stop reason: HOSPADM

## 2024-09-13 RX ORDER — ALUMINUM HYDROXIDE, MAGNESIUM HYDROXIDE, AND SIMETHICONE 1200; 120; 1200 MG/30ML; MG/30ML; MG/30ML
30 SUSPENSION ORAL 4 TIMES DAILY PRN
Status: DISCONTINUED | OUTPATIENT
Start: 2024-09-13 | End: 2024-09-14 | Stop reason: HOSPADM

## 2024-09-13 RX ORDER — ENOXAPARIN SODIUM 100 MG/ML
40 INJECTION SUBCUTANEOUS EVERY 24 HOURS
Status: DISCONTINUED | OUTPATIENT
Start: 2024-09-13 | End: 2024-09-13

## 2024-09-13 RX ADMIN — PROCHLORPERAZINE EDISYLATE 5 MG: 5 INJECTION INTRAMUSCULAR; INTRAVENOUS at 05:09

## 2024-09-13 RX ADMIN — FUROSEMIDE 60 MG: 10 INJECTION, SOLUTION INTRAMUSCULAR; INTRAVENOUS at 08:09

## 2024-09-13 RX ADMIN — TRAZODONE HYDROCHLORIDE 50 MG: 50 TABLET ORAL at 08:09

## 2024-09-13 RX ADMIN — ISOSORBIDE DINITRATE: 20 TABLET ORAL at 04:09

## 2024-09-13 RX ADMIN — SACUBITRIL AND VALSARTAN 1 TABLET: 24; 26 TABLET, FILM COATED ORAL at 08:09

## 2024-09-13 RX ADMIN — ISOSORBIDE DINITRATE: 20 TABLET ORAL at 08:09

## 2024-09-13 RX ADMIN — FUROSEMIDE 60 MG: 10 INJECTION, SOLUTION INTRAMUSCULAR; INTRAVENOUS at 02:09

## 2024-09-13 RX ADMIN — ENOXAPARIN SODIUM 40 MG: 40 INJECTION SUBCUTANEOUS at 08:09

## 2024-09-13 RX ADMIN — METOPROLOL SUCCINATE 100 MG: 50 TABLET, EXTENDED RELEASE ORAL at 08:09

## 2024-09-13 RX ADMIN — DAPAGLIFLOZIN 10 MG: 10 TABLET, FILM COATED ORAL at 02:09

## 2024-09-13 RX ADMIN — AMLODIPINE BESYLATE 10 MG: 5 TABLET ORAL at 02:09

## 2024-09-13 RX ADMIN — FUROSEMIDE 60 MG: 10 INJECTION, SOLUTION INTRAMUSCULAR; INTRAVENOUS at 04:09

## 2024-09-13 RX ADMIN — ONDANSETRON 4 MG: 2 INJECTION INTRAMUSCULAR; INTRAVENOUS at 04:09

## 2024-09-13 NOTE — PROGRESS NOTES
Inpatient Nutrition Evaluation    Admit Date: 9/12/2024   Total duration of encounter: 1 day   Patient Age: 24 y.o.    Nutrition Recommendation/Prescription     Continue Low Sodium 2 gm Diet.  Monitor wt, labs, and po intake.    Nutrition Assessment     Chart Review    Reason Seen: continuous nutrition monitoring    Malnutrition Screening Tool Results   Have you recently lost weight without trying?: No  Have you been eating poorly because of a decreased appetite?: No   MST Score: 0   Diagnosis:  CHF, Morbid obesity    Relevant Medical History: N/A    Scheduled Medications:  amLODIPine, 10 mg, Daily  dapagliflozin propanediol, 10 mg, Daily  enoxparin, 40 mg, Daily  furosemide (LASIX) injection, 60 mg, Q8H  perflutren lipid microspheres, 3 mL, Once  sacubitriL-valsartan, 1 tablet, BID    Continuous Infusions:   PRN Medications:   Current Facility-Administered Medications:     acetaminophen, 650 mg, Oral, Q6H PRN    aluminum-magnesium hydroxide-simethicone, 30 mL, Oral, QID PRN    benzonatate, 100 mg, Oral, TID PRN    hydrALAZINE, 10 mg, Intravenous, Q2H PRN    melatonin, 6 mg, Oral, Nightly PRN    naloxone, 0.02 mg, Intravenous, PRN    nitroGLYCERIN, 0.4 mg, Sublingual, Q5 Min PRN    ondansetron, 4 mg, Intravenous, Q4H PRN    polyethylene glycol, 17 g, Oral, BID PRN    prochlorperazine, 5 mg, Intravenous, Q6H PRN    simethicone, 1 tablet, Oral, QID PRN    traZODone, 50 mg, Oral, Nightly PRN    Recent Labs   Lab 09/12/24 2040 09/13/24  0507    139   K 4.2 4.6   CALCIUM 9.2 9.1   PHOS  --  5.5*   MG  --  2.00   * 106   CO2 22 23   BUN 22.8* 25.8*   CREATININE 2.05* 1.92*   EGFRNORACEVR 46 49   GLUCOSE 92 92   BILITOT 0.7 0.6   ALKPHOS 89 89   ALT 31 30   AST 29 32   ALBUMIN 3.5 3.7   LIPASE 14  --    WBC 10.81 10.22   HGB 13.6* 13.8*   HCT 42.2 43.5     Nutrition Orders:  Diet Low Sodium, 2gm      Appetite/Oral Intake: good/% of meals  Factors Affecting Nutritional Intake: none  "identified  Food/Catholic/Cultural Preferences: none reported  Food Allergies: no known food allergies  Last Bowel Movement: 09/12/24  Wound(s):     Wound 09/12/24 2330 Abrasion(s) Nose-Tissue loss description: Not applicable     Comments    9/13/24: Pt in and out of sleep. Reports good intake, no gi symptoms. No wt loss reported.     Anthropometrics    Height: 5' 10" (177.8 cm), Height Method: Stated  Last Weight: (!) 167.8 kg (370 lb) (09/12/24 2330), Weight Method: Stated  BMI (Calculated): 53.1  BMI Classification: obese grade III (BMI >/=40)        Ideal Body Weight (IBW), Male: 166 lb     % Ideal Body Weight, Male (lb): 222.89 %                          Usual Weight Provided By: EMR weight history    Wt Readings from Last 5 Encounters:   09/12/24 (!) 167.8 kg (370 lb)   07/23/24 122.5 kg (270 lb)   07/23/24 (!) 160.3 kg (353 lb 6.4 oz)   07/23/24 122.5 kg (270 lb)     Weight Change(s) Since Admission:   Wt Readings from Last 1 Encounters:   09/12/24 2330 (!) 167.8 kg (370 lb)   09/12/24 1858 (!) 167.8 kg (370 lb)   Admit Weight: (!) 167.8 kg (370 lb) (09/12/24 1858), Weight Method: Stated    Patient Education     Not applicable.    Nutrition Goals & Monitoring     Dietitian will monitor: food and beverage intake, weight, and electrolyte/renal panel    Nutrition Risk/Follow-Up: low (follow-up in 5-7 days)  Patients assigned 'low nutrition risk' status do not qualify for a full nutritional assessment but will be monitored and re-evaluated in a 5-7 day time period. Please consult if re-evaluation needed sooner.   "

## 2024-09-13 NOTE — PLAN OF CARE
Problem: Adult Inpatient Plan of Care  Goal: Plan of Care Review  Outcome: Progressing  Flowsheets (Taken 9/13/2024 1705)  Plan of Care Reviewed With: patient  Goal: Patient-Specific Goal (Individualized)  Outcome: Progressing  Flowsheets (Taken 9/13/2024 1705)  Anxieties, Fears or Concerns: none  Patient/Family-Specific Goals (Include Timeframe): none  Goal: Absence of Hospital-Acquired Illness or Injury  Outcome: Progressing  Goal: Optimal Comfort and Wellbeing  Outcome: Progressing  Intervention: Monitor Pain and Promote Comfort  Flowsheets (Taken 9/13/2024 1705)  Pain Management Interventions: pain management plan reviewed with patient/caregiver  Intervention: Provide Person-Centered Care  Flowsheets (Taken 9/13/2024 1705)  Trust Relationship/Rapport: care explained  Goal: Readiness for Transition of Care  Outcome: Progressing     Problem: Bariatric Environmental Safety  Goal: Safety Maintained with Care  Outcome: Progressing     Problem: Wound  Goal: Optimal Coping  Outcome: Progressing  Goal: Optimal Functional Ability  Outcome: Progressing  Intervention: Optimize Functional Ability  Flowsheets (Taken 9/13/2024 1705)  Activity Management: Ambulated in room - L4  Activity Assistance Provided: independent  Goal: Absence of Infection Signs and Symptoms  Outcome: Progressing  Goal: Improved Oral Intake  Outcome: Progressing  Goal: Optimal Pain Control and Function  Outcome: Progressing  Goal: Skin Health and Integrity  Outcome: Progressing  Goal: Optimal Wound Healing  Outcome: Progressing

## 2024-09-13 NOTE — PLAN OF CARE
09/13/24 1553   Discharge Assessment   Assessment Type Discharge Planning Assessment   Confirmed/corrected address, phone number and insurance Yes   Source of Information patient   When was your last doctors appointment?   (PCP is Lele Verde NP.)   Reason For Admission CP   People in Home grandparent(s)   Do you expect to return to your current living situation? Yes   Do you have help at home or someone to help you manage your care at home? Yes   Who are your caregiver(s) and their phone number(s)? Sherlyn/Grandmother/[phone]   Current cognitive status: Alert/Oriented   Walking or Climbing Stairs Difficulty no   Dressing/Bathing Difficulty no   Equipment Currently Used at Home none   Readmission within 30 days? No   Do you currently have service(s) that help you manage your care at home? No   Do you take prescription medications? Yes   Do you have prescription coverage? Yes   Do you have any problems affording any of your prescribed medications? No   Who is going to help you get home at discharge? Grandmother   Are you on dialysis? No   Do you take coumadin? No   Discharge Plan A Home   Discharge Plan B Home   Discharge Plan discussed with: Patient

## 2024-09-13 NOTE — NURSING
Nurses Note -- 4 Eyes      9/13/2024   7:27 AM      Skin assessed during: Q Shift Change      [] No Altered Skin Integrity Present    []Prevention Measures Documented      [x] Yes- Altered Skin Integrity Present or Discovered   [x] LDA Added if Not in Epic (Describe Wound)   [x] New Altered Skin Integrity was Present on Admit and Documented in LDA   [x] Wound Image Taken    Wound Care Consulted? Yes    Attending Nurse:  RAINA Jiménez     Second RN/Staff Member:  RAINA Aquino

## 2024-09-13 NOTE — PROGRESS NOTES
[facility] 6th Floor Medical Telemetry  Wound Care    Patient Name:  Michael Conti   MRN:  [id number]  Date: 9/13/2024  Diagnosis: <principal problem not specified>    History:     No past medical history on file.    Social History     Socioeconomic History    Marital status: Single   Tobacco Use    Smoking status: Never     Passive exposure: Never    Smokeless tobacco: Never   Substance and Sexual Activity    Alcohol use: Yes    Drug use: Never       Precautions:     Allergies as of 09/12/2024    (No Known Allergies)       WOC Assessment Details/Treatment     Initial visit regarding affected  area at bridge of nose, noting pink nonpigmented epithelialized area , which patient described as a resolved abrasion open to air.        09/13/24 0900        Wound 09/12/24 2330 Abrasion(s) Nose   Date First Assessed/Time First Assessed: 09/12/24 2330   Present on Original Admission: Yes  Primary Wound Type: Abrasion(s)  Location: Nose   Wound Image    Dressing Appearance Open to air   Drainage Amount None   Appearance Intact;Epithelialization;Pink   Tissue loss description Not applicable         Recommendations made to primary team for keeping area clean and dry .      09/13/2024

## 2024-09-13 NOTE — CONSULTS
Inpatient consult to Cardiology  Consult performed by: Karyn Antoine FNP  Consult ordered by: Castillo Parikh MD  Reason for consult: NSTEMI, CHF      [facility] - 6th Floor Medical Telemetry    Cardiology  Consult Note    Patient Name: Michael Conti  MRN: [id number]  Admission Date: 9/12/2024  Hospital Length of Stay: 1 days  Code Status: Full Code   Attending Provider: Kodi Tran MD   Consulting Provider: ANISH Wood  Primary Care Physician: Lele Verde NP  Principal Problem:<principal problem not specified>    Patient information was obtained from patient, past medical records, ER records, and primary team.     Subjective:     Chief Complaint/Reason for Consult: NSTEMI, CHF    HPI: Mr. Conti is a 23 y/o male with a history of CHF, HTN, PHTN, NICMO who is known to [facility], Dr. Flynn. He presented to the ER on 9.12.24 with complaints of chest pain. He describes chest pain as a tightness. He reports he gets this pain when he is fluid overloaded. He reports cough with red tinged sputum. He reports he went to [facility] for the same symptoms after a fall and he was given lasix and discharged. Significant labs include BUN/Creat 1.92, BNP 3367.3, Troponin (0.605). CXR unremarkable. EKG shows sinus tachycardia with LVH, prolonged , nonspecific T wave abnormality. [facility] has been consulted for NSTEMI and CHF.    PMH: CHF, HTN, PHTN, NICMO, ADHD, Morbid Obesity  PSH: RHC, C   Family History: Mother - Heart Failure   Social History: Denies illicit drug use, alcohol use, and smoking.     Previous Cardiac Diagnostics:   ECHO (9.13.24):  Left Ventricle: The left ventricle is severely dilated. Severe global hypokinesis present. There is severely reduced systolic function with a visually estimated ejection fraction of 10 -15%. There is diastolic dysfunction. Right Ventricle: Normal right ventricular cavity size. Systolic function is mildly reduced. Left Atrium: Left atrium is severely  dilated. Right Atrium: Right atrium is moderately dilated. Mitral Valve: There is mild regurgitation. Tricuspid Valve: There is mild regurgitation with the RVSP of 35mmHg. Pulmonic Valve: There is mild regurgitation. IVC/SVC: Elevated venous pressure at 15 mmHg.    LHC (7.23.24):   Left Ventricle: The left ventricle is severely dilated. Moderately increased ventricular mass. Normal wall thickness. There is moderate eccentric hypertrophy. Global hypokinesis present. There is severely reduced systolic function with a visually estimated ejection fraction of 10 -15%. Ejection fraction by visual approximation is 15%. Grade II diastolic dysfunction. Possible thrombus in the apex that is non-mobile and only seen on contrast ( both Dr Simon and I think likely not but there is enough suspicion that we cannot exclude). Right Ventricle: Mild right ventricular enlargement. Wall thickness is normal. Systolic function is borderline low to slightly reduced despite the normal TAPSE. Left Atrium: Left atrium is severely dilated. Right Atrium: Right atrium is mildly dilated. Mitral Valve: There is mild to moderate regurgitation. Tricuspid Valve: There is mild regurgitation. Pulmonary Artery: There is mild pulmonary hypertension. The estimated pulmonary artery systolic pressure is 44 mmHg. IVC/SVC: Normal venous pressure at 3 mmHg.    RHC/LHC (2.29.24):  LMCA: Normal. LAD: Normal. LCX: Normal. RCA: Normal     MPI (2.20.24):  This is an abnormal perfusion study. Study is consistent with ischemia. This scan is suggestive of high risk for future cardiovascular events. Medium reversible perfusion abnormality of moderate intensity in the anterior region. Large reversible perfusion abnormality of moderate intensity in the inferior region. Small reversible perfusion abnormality of moderate intensity in the basal david lateral segment. Medium reversible perfusion abnormality of moderate intensity in the inferior lateral region. Perfusion  imaging is suggestive of three vessel disease. Perfusion defect is in the distribution of left anterior descending artery, right coronary artery and left circumflex artery. The study quality is average. The left ventricular cavity is noted to be markedly enlarged on the stress study. The left ventricular ejection fraction was calculated to be 20% and left ventricular global function is severely reduced.    No past medical history on file.  No past surgical history on file.  Review of patient's allergies indicates:  No Known Allergies  No current facility-administered medications on file prior to encounter.     Current Outpatient Medications on File Prior to Encounter   Medication Sig    amLODIPine (NORVASC) 10 MG tablet Take 10 mg by mouth once daily.    benzonatate (TESSALON) 100 MG capsule Take 100 mg by mouth 3 (three) times daily as needed.    ENTRESTO 24-26 mg per tablet Take 1 tablet by mouth 2 (two) times daily.    FARXIGA 10 mg tablet Take 10 mg by mouth once daily.    nitroGLYCERIN (NITROSTAT) 0.4 MG SL tablet Place 0.4 mg under the tongue every 5 (five) minutes as needed.    potassium chloride (KLOR-CON) 10 MEQ TbSR Take 10 mEq by mouth once daily.    traZODone (DESYREL) 50 MG tablet Take 50 mg by mouth nightly as needed.       Review of Systems   Respiratory:  Positive for chest tightness and shortness of breath.    Cardiovascular:  Negative for chest pain, palpitations and leg swelling.   All other systems reviewed and are negative.      Objective:     Vital Signs (Most Recent):  Temp: 97 °F (36.1 °C) (09/13/24 1130)  Pulse: 94 (09/13/24 1130)  Resp: (!) 30 (09/13/24 1130)  BP: (!) 132/94 (09/13/24 1130)  SpO2: 96 % (09/13/24 1130) Vital Signs (24h Range):  Temp:  [97 °F (36.1 °C)-98.2 °F (36.8 °C)] 97 °F (36.1 °C)  Pulse:  [] 94  Resp:  [10-30] 30  SpO2:  [91 %-97 %] 96 %  BP: (104-167)/() 132/94   Weight: (!) 167.8 kg (370 lb)  Body mass index is 53.09 kg/m².  SpO2: 96 %       Intake/Output  "Summary (Last 24 hours) at 9/13/2024 1347  Last data filed at 9/13/2024 0919  Gross per 24 hour   Intake 120 ml   Output 1475 ml   Net -1355 ml     Lines/Drains/Airways       Peripheral Intravenous Line  Duration                  Peripheral IV - Single Lumen 09/13/24 0500 20 G Anterior;Right Wrist <1 day                  Significant Labs:   Chemistries:   Recent Labs   Lab 09/12/24 2040 09/13/24  0507 09/13/24  0934    139  --    K 4.2 4.6  --    * 106  --    CO2 22 23  --    BUN 22.8* 25.8*  --    CREATININE 2.05* 1.92*  --    CALCIUM 9.2 9.1  --    BILITOT 0.7 0.6  --    ALKPHOS 89 89  --    ALT 31 30  --    AST 29 32  --    GLUCOSE 92 92  --    MG  --  2.00  --    PHOS  --  5.5*  --    TROPONINI 0.231* 0.605* 0.555*        CBC/Anemia Labs: Coags:    Recent Labs   Lab 09/12/24 2040 09/13/24  0507   WBC 10.81 10.22   HGB 13.6* 13.8*   HCT 42.2 43.5    269   MCV 84.1 85.0   RDW 15.3 15.0    No results for input(s): "PT", "INR", "APTT" in the last 168 hours.     Significant Imaging:  Imaging Results              X-Ray Chest AP Portable (Final result)  Result time 09/12/24 20:30:28      Final result by Riley Don MD (09/12/24 20:30:28)                   Impression:      No acute abnormality.      Electronically signed by: Riley Don MD  Date:    09/12/2024  Time:    20:30               Narrative:    EXAMINATION:  XR CHEST AP PORTABLE    CLINICAL HISTORY:  Shortness of breath    TECHNIQUE:  Single frontal view of the chest was performed.    COMPARISON:  None    FINDINGS:  The lungs are clear, with normal appearance of pulmonary vasculature and no pleural effusion or pneumothorax.    The cardiac silhouette is normal in size. The hilar and mediastinal contours are unremarkable.    Bones are intact.                                    EKG:       Telemetry:  SR    Physical Exam  Vitals and nursing note reviewed.   HENT:      Head: Normocephalic.      Mouth/Throat:      Mouth: Mucous membranes are " moist.   Eyes:      Extraocular Movements: Extraocular movements intact.   Cardiovascular:      Rate and Rhythm: Normal rate and regular rhythm.      Pulses: Normal pulses.      Heart sounds: Murmur heard.   Pulmonary:      Effort: Pulmonary effort is normal.   Abdominal:      Palpations: Abdomen is soft.   Musculoskeletal:         General: Normal range of motion.      Cervical back: Normal range of motion.   Skin:     General: Skin is warm.   Neurological:      Mental Status: He is alert and oriented to person, place, and time.   Psychiatric:         Mood and Affect: Mood normal.         Behavior: Behavior normal.       Home Medications:   No current facility-administered medications on file prior to encounter.     Current Outpatient Medications on File Prior to Encounter   Medication Sig Dispense Refill    amLODIPine (NORVASC) 10 MG tablet Take 10 mg by mouth once daily.      benzonatate (TESSALON) 100 MG capsule Take 100 mg by mouth 3 (three) times daily as needed.      ENTRESTO 24-26 mg per tablet Take 1 tablet by mouth 2 (two) times daily.      FARXIGA 10 mg tablet Take 10 mg by mouth once daily.      nitroGLYCERIN (NITROSTAT) 0.4 MG SL tablet Place 0.4 mg under the tongue every 5 (five) minutes as needed.      potassium chloride (KLOR-CON) 10 MEQ TbSR Take 10 mEq by mouth once daily.      traZODone (DESYREL) 50 MG tablet Take 50 mg by mouth nightly as needed.       Current Schedule Inpatient Medications:   amLODIPine  10 mg Oral Daily    dapagliflozin propanediol  10 mg Oral Daily    enoxparin  40 mg Subcutaneous Daily    furosemide (LASIX) injection  60 mg Intravenous Q8H    perflutren lipid microspheres  3 mL Intravenous Once    sacubitriL-valsartan  1 tablet Oral BID     Continuous Infusions:    Assessment:   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     - BNP 3367.3  NSTEMI type II in the setting of Heart Failure     - Troponin peaked at 0.674  Prolonged QTC    - 526 on EKG  CKD  III  HTN  PHTN  NICMO    - EF 10-15%    - [facility] (2.29.24): Normal Coronaries   VHD    - Mild MR/TR  ADHD  Morbid Obesity  No known history of GI Bleed     Plan:   ECHO Reviewed  Resume Home Medications  On HD Entresto () Outpatient; Will resume Home Dose once diurese complete  Obtain Lactic Acid    EKG Today to assess QTC   RHC and CPX planned on 9.24.24 in [place] with Dr. Banks   Patient has been denied LVAD and OHT secondary to Elevated BMI  Patient inquired about being placed on Ozempic or Trulicity for Weight Loss; Instructed patient to talk with his PCP or Primary Cardiologist   Keep Mag > 2 and Potassium > 4  Labs and EKG in AM: CBC, BMP, and Mag     Thank you for your consult.     ANISH Wood  Cardiology  [facility] - 6th Floor Medical Telemetry  09/13/2024

## 2024-09-13 NOTE — PROGRESS NOTES
Pharmacist Renal Dose Adjustment Note    Michael Conti is a 24 y.o. male being treated with the medication Lovenox    Patient Data:    Vital Signs (Most Recent):  Temp: 97 °F (36.1 °C) (09/13/24 1130)  Pulse: 94 (09/13/24 1130)  Resp: (!) 30 (09/13/24 1130)  BP: (!) 132/94 (09/13/24 1130)  SpO2: 96 % (09/13/24 1130) Vital Signs (72h Range):  Temp:  [97 °F (36.1 °C)-98.2 °F (36.8 °C)]   Pulse:  []   Resp:  [10-30]   BP: (104-167)/()   SpO2:  [91 %-97 %]      Recent Labs   Lab 09/12/24 2040 09/13/24  0507   CREATININE 2.05* 1.92*     Serum creatinine: 1.92 mg/dL (H) 09/13/24 0507  Estimated creatinine clearance: 93.1 mL/min (A)    Lovenox 40mg q24h will be changed to Lovenox 40mg q12h based on Creatinine Clearance = 93.1 mL/min and BMI = 53.09 kg/m2.      Pharmacist's Name: Naun Bello  Pharmacist's Extension: 0548

## 2024-09-13 NOTE — NURSING
Nurses Note -- 4 Eyes      9/12/2024   11:37 PM      Skin assessed during: Admit      [] No Altered Skin Integrity Present    []Prevention Measures Documented      [x] Yes- Altered Skin Integrity Present or Discovered   [x] LDA Added if Not in Epic (Describe Wound)   [x] New Altered Skin Integrity was Present on Admit and Documented in LDA   [x] Wound Image Taken    Wound Care Consulted? Yes    Attending Nurse:  Kenia Arteaga RN/Staff Member:   RAINA Orlando

## 2024-09-13 NOTE — AI DETERIORATION ALERT
Artificial Intelligence Notification  [facility]  [address] [address]  Phone: [phone]    This documentation was triggered by an Artificial Intelligence Notification:    Admit Date: 2024   LOS: 1  Code Status: Full Code  : 2000  Age: 24 y.o.  Weight:   Wt Readings from Last 1 Encounters:   24 (!) 167.8 kg (370 lb)        Sex: male  Bed: 633/633 A  MRN: [id number]  Attending Physician: Kodi Tran MD     Date of Alert: 2024  Time AI Alert Received: 1148            Vitals:    24 1130   BP: (!) 132/94   Pulse: 94   Resp: (!) 30   Temp: 97 °F (36.1 °C)     SpO2: 96 %      Artificial Intelligence alert discussed with Provider:     Name: RAINA Delarosa    Date/Time of Provider Notification: 24 1300      Patient Condition: Labs reviewed. CXR reviewed. Vitals reviewed. Echo shows EF of 10-15%. Rounded on patient with RN Isaura, patient resting comfortably in chair eating lunch. Patient denies shortness of breath, on room air. No distress noted. Respiratory rate controlled. Antihypertensive oral medications restarted by hospitalist. Amlodipine 10mg due now as well as lasix. No acute intervention needed at this time. Told nurse to call response nurse [phone] if patient further declines or if she has any questions.

## 2024-09-13 NOTE — H&P
"[facility] Hospital Medicine History & Physical Examination       Patient Name: Michael Conti  MRN: [id number]  Patient Class: IP- Inpatient   Admission Date: 9/12/2024   Admitting Physician: LAUREN Service   Length of Stay: 1  Attending Physician: Dr Castillo Parikh  Primary Care Provider: Lele Verde NP  Face-to-Face encounter date: 09/13/2024  Code Status: Full code  Chief Complaint: Chest Pain (Via [facility] for CP since 7 am. Hx CHF. States was seen at [facility] earlier today because he was throwing up blood.)        Screening for Social Drivers for health:  Patient screened for food insecurity, housing instability, transportation needs, utility difficulties, and interpersonal safety (select all that apply as identified as concern)  []Housing or Food  []Transportation Needs  []Utility Difficulties  []Interpersonal safety  [x]None    Patient information was obtained from patient, patient's family, past medical records and/or ER records.     HISTORY OF PRESENT ILLNESS:   Michael Conti is a 24 y.o. male who PMH includes HTN, CHF; obesity; presents to the ED at [facility] on 9/12/2024 with a primary complaint of chest pain and associated shortness of breath with hemoptysis.  PT was seen at [facility] earlier in the day of 9/12/2024 for same complaints, given IV lasix and d/c home. Pt reports the symptoms persisted with associated which he called EMS and was brought ot [facility] for further evaluation. Pr reports productive cough with "red" sputum. No reports of fever, chills, diarrhea, black tarry stool, melena, bright red bleeding per rectum or any gross hematemesis.  He did report nausea with coughing up blood-tinged sputum.  Patient reports he is compliant with his home medication.  He reports no excessive weight gain.  Lab work reviewed demonstrated chloride 108, BUN 22.8, creatinine 2.05, BNP 3367.3, troponin 0.231 with repeat values 0.605, 0.555; other indices " unremarkable.  Chest x-ray impression reviewed demonstrated no acute abnormality.    Initial vital signs /70 pulse 106 respirations 24 temperature 98.2° F O2 saturation 97% on room air.  Patient received Lasix , weight based Lovenox and 325 mg aspirin in the ED. patient is admitted to hospital medicine services for further management.    Cardiologist: Dr. Gee Soto    PAST MEDICAL HISTORY:   HTN   CHF   Obesity    PAST SURGICAL HISTORY:   Denies significant    ALLERGIES:   Patient has no known allergies.    FAMILY HISTORY:   Reviewed and negative    SOCIAL HISTORY:   No reports of alcohol, tobacco, or illicit drug use   Lives with family     HOME MEDICATIONS:   As documented  Prior to Admission medications    Medication Sig Start Date End Date Taking? Authorizing Provider   amLODIPine (NORVASC) 10 MG tablet Take 10 mg by mouth once daily. 6/24/24   Provider, Historical   benzonatate (TESSALON) 100 MG capsule Take 100 mg by mouth 3 (three) times daily as needed. 2/1/24   Provider, Historical   ENTRESTO 24-26 mg per tablet Take 1 tablet by mouth 2 (two) times daily. 2/8/24   Provider, Historical   FARXIGA 10 mg tablet Take 10 mg by mouth once daily.    Provider, Historical   nitroGLYCERIN (NITROSTAT) 0.4 MG SL tablet Place 0.4 mg under the tongue every 5 (five) minutes as needed. 6/24/24   Provider, Historical   potassium chloride (KLOR-CON) 10 MEQ TbSR Take 10 mEq by mouth once daily. 2/8/24   Provider, Historical   traZODone (DESYREL) 50 MG tablet Take 50 mg by mouth nightly as needed.    Provider, Historical       REVIEW OF SYSTEMS:   Except as documented, all other systems reviewed and negative     PHYSICAL EXAM:     VITAL SIGNS: 24 HRS MIN & MAX LAST   Temp  Min: 97 °F (36.1 °C)  Max: 98.2 °F (36.8 °C) 97 °F (36.1 °C)   BP  Min: 104/70  Max: 167/118 (!) 132/94   Pulse  Min: 90  Max: 106  94   Resp  Min: 10  Max: 30 (!) 30   SpO2  Min: 91 %  Max: 97 % 96 %       General appearance: Well-developed,  well-nourished male obese, non-toxic, appears older than stated age,  in no apparent distress.  HENT: Atraumatic head. Moist mucous membranes of oral cavity.  Eyes: PERRL  Lungs: diminished bilaterally. No wheezing present.   Heart: Regular rate and rhythm. S1 and S2 present, systolic murmur,  cap refill brisk, edema BLE  Abdomen: Soft, obese, non-distended, non-tender. No rebound tenderness/guarding. Bowel sounds are normal.   Extremities: No cyanosis, clubbing,generalized weakness  Skin: warm and dry   Neuro: oriented x 3, no acute focal deficits  Psych/mental status: flat affect, cooperative     LABS AND IMAGING:     Recent Labs   Lab 09/12/24 2040 09/13/24  0507   WBC 10.81 10.22   RBC 5.02 5.12   HGB 13.6* 13.8*   HCT 42.2 43.5   MCV 84.1 85.0   MCH 27.1 27.0   MCHC 32.2* 31.7*   RDW 15.3 15.0    269   MPV 9.8 9.9       Recent Labs   Lab 09/12/24 2040 09/13/24  0507    139   K 4.2 4.6   * 106   CO2 22 23   BUN 22.8* 25.8*   CREATININE 2.05* 1.92*   CALCIUM 9.2 9.1   MG  --  2.00   ALBUMIN 3.5 3.7   ALKPHOS 89 89   ALT 31 30   AST 29 32   BILITOT 0.7 0.6       Microbiology Results (last 7 days)       ** No results found for the last 168 hours. **             Echo    Left Ventricle: The left ventricle is severely dilated. Severe global   hypokinesis present. There is severely reduced systolic function with a   visually estimated ejection fraction of 10 -15%. There is diastolic   dysfunction.    Right Ventricle: Normal right ventricular cavity size. Systolic   function is mildly reduced.    Left Atrium: Left atrium is severely dilated.    Right Atrium: Right atrium is moderately dilated.    Mitral Valve: There is mild regurgitation.    Tricuspid Valve: There is mild regurgitation with the RVSP of 35mmHg.    Pulmonic Valve: There is mild regurgitation.    IVC/SVC: Elevated venous pressure at 15 mmHg.        ASSESSMENT & PLAN:   ASSESSMENT:  Acute CHF exacerbation- with diastolic dysfunction, EF  10-15%-POA  Acute chest pain- suspected secondary to CHF exacerbation- PO  NSTEMI type II   HTN-urgency-POA   ALMAS- POA  Morbidly obese- POA      PLAN:  Consult Cardiology Services appreciate assistance and recommendations   Continue with diuresing IV Lasix therapy   Accurate I&O   Daily weights   Echocardiogram   Renal US  PRN anti-hypertensive per parameters  Home medication as deemed necessary  Labs in the am        VTE Prophylaxis: Heparin for DVT prophylaxis and will be advised to be as mobile as possible and sit in a chair as tolerated    Patient condition:  Stable    __________________________________________________________________________  INPATIENT LIST OF MEDICATIONS     Scheduled Meds:   amLODIPine  10 mg Oral Daily    dapagliflozin propanediol  10 mg Oral Daily    enoxparin  40 mg Subcutaneous Daily    furosemide (LASIX) injection  60 mg Intravenous Q8H    perflutren lipid microspheres  3 mL Intravenous Once    sacubitriL-valsartan  1 tablet Oral BID     Continuous Infusions:see mar  PRN Meds:.  Current Facility-Administered Medications:     acetaminophen, 650 mg, Oral, Q6H PRN    aluminum-magnesium hydroxide-simethicone, 30 mL, Oral, QID PRN    benzonatate, 100 mg, Oral, TID PRN    hydrALAZINE, 10 mg, Intravenous, Q2H PRN    melatonin, 6 mg, Oral, Nightly PRN    naloxone, 0.02 mg, Intravenous, PRN    nitroGLYCERIN, 0.4 mg, Sublingual, Q5 Min PRN    ondansetron, 4 mg, Intravenous, Q4H PRN    polyethylene glycol, 17 g, Oral, BID PRN    prochlorperazine, 5 mg, Intravenous, Q6H PRN    simethicone, 1 tablet, Oral, QID PRN    traZODone, 50 mg, Oral, Nightly PRN      Cindy OSORIO FNP have reviewed and discussed the case with   Dr. Castillo Parikh. Please see the following addendum for further assessment and plan from their attending MD.  ANISH Prajapati   09/13/2024    ________________________________________________________________________________    MD [facility]:    24-year-old obese   gentleman with a history of CHF presented with complaints of acute onset central chest pain associated with tightness that worsens with deep breaths cough with blood-tinged sputum.  At presentation he was doing well on room air and blood pressure was elevated.  He was diuresed and renewed to hospital medicine service.  When seen at bedside he was alert, comfortably resting in the chair.  Denies any medication nonadherence at baseline.  Denies any chest pain at this moment.  Lung exam showed mild crackles basally.  No family members were seen at bedside.  Troponin remains elevated but improving since admission.  Agree with a HP I.  TTE showing EF of 10-15%, diastolic dysfunction, mild MR.  We will review and renew other appropriate home meds, GDMT, monitor I's and O's and electrolytes.  Await further recommendations from Cardiology.    Critical care diagnosis:  Acute exacerbation of CHF requiring IV diuresis  Critical care time: 50 minutes

## 2024-09-13 NOTE — ED PROVIDER NOTES
Encounter Date: 9/12/2024    SCRIBE #1 NOTE: I, John Bui, am scribing for, and in the presence of,  Peg Alford MD. I have scribed the following portions of the note - Other sections scribed: HPI, ROS, PE.       History     Chief Complaint   Patient presents with    Chest Pain     [facility]ia [facility] for CP since 7 am. Hx CHF. States was seen at [facility] earlier today because he was throwing up blood.     24 year old male with pmhx of CHF presents to ED c/o chest pain onset this morning. Pt states that chest pain feels like tightness, is consistent with when he is fluid overloaded, reports additional symptom of cough productive of red tinged sputum. Pt states that he was seen at [facility] for same symptoms following a fall this morning, was given IV lasix and discharged. Pt reports no lasting relief from OG treatment. Pt denies fever, chills.    Cardiologist: Gee Soto MD    The history is provided by the patient. No  was used.     Review of patient's allergies indicates:  No Known Allergies  No past medical history on file.  No past surgical history on file.  No family history on file.  Social History     Tobacco Use    Smoking status: Never     Passive exposure: Never    Smokeless tobacco: Never   Substance Use Topics    Alcohol use: Yes    Drug use: Never     Review of Systems   Constitutional:  Negative for chills, fatigue and fever.   Eyes:  Negative for visual disturbance.   Respiratory:  Positive for cough. Negative for chest tightness and shortness of breath.    Cardiovascular:  Positive for chest pain.   Gastrointestinal:  Negative for abdominal pain, diarrhea, nausea and vomiting.   Genitourinary:  Negative for dysuria and hematuria.   Musculoskeletal:  Negative for back pain and neck pain.   Skin:  Negative for rash.   Allergic/Immunologic: Negative for immunocompromised state.   Neurological:  Negative for headaches.       Physical Exam     Initial Vitals  [09/12/24 1858]   BP Pulse Resp Temp SpO2   104/70 106 (!) 24 98.2 °F (36.8 °C) 97 %      MAP       --         Physical Exam    Nursing note and vitals reviewed.  Constitutional: He appears well-developed and well-nourished. No distress.   Elevated BMI   HENT:   Head: Normocephalic.   Mouth/Throat: Oropharynx is clear and moist.   Abrasion to nasal bridge    Eyes: No scleral icterus.   Neck: Neck supple.   Normal range of motion.  Cardiovascular:  Normal rate and regular rhythm.           Pulmonary/Chest: No respiratory distress.   Diminished breath sounds, worse on right    Abdominal: Abdomen is soft. He exhibits no distension. There is no abdominal tenderness.   Musculoskeletal:      Cervical back: Normal range of motion and neck supple.     Neurological: He is alert and oriented to person, place, and time. GCS score is 15.   Skin: Skin is warm and dry.         ED Course   Procedures  Labs Reviewed   COMPREHENSIVE METABOLIC PANEL - Abnormal       Result Value    Sodium 141      Potassium 4.2      Chloride 108 (*)     CO2 22      Glucose 92      Blood Urea Nitrogen 22.8 (*)     Creatinine 2.05 (*)     Calcium 9.2      Protein Total 6.8      Albumin 3.5      Globulin 3.3      Albumin/Globulin Ratio 1.1      Bilirubin Total 0.7      ALP 89      ALT 31      AST 29      eGFR 46      Anion Gap 11.0      BUN/Creatinine Ratio 11     TROPONIN I - Abnormal    Troponin-I 0.231 (*)    CBC WITH DIFFERENTIAL - Abnormal    WBC 10.81      RBC 5.02      Hgb 13.6 (*)     Hct 42.2      MCV 84.1      MCH 27.1      MCHC 32.2 (*)     RDW 15.3      Platelet 243      MPV 9.8      Neut % 65.6      Lymph % 25.6      Mono % 7.6      Eos % 0.3      Basophil % 0.4      Lymph # 2.77      Neut # 7.10      Mono # 0.82      Eos # 0.03      Baso # 0.04      IG# 0.05 (*)     IG% 0.5      NRBC% 0.0     B-TYPE NATRIURETIC PEPTIDE - Abnormal    Natriuretic Peptide 3,367.3 (*)    LIPASE - Normal    Lipase Level 14     CBC W/ AUTO DIFFERENTIAL     Narrative:     The following orders were created for panel order CBC auto differential.  Procedure                               Abnormality         Status                     ---------                               -----------         ------                     CBC with Differential[[id number]]       Abnormal            Final result                 Please view results for these tests on the individual orders.     EKG Readings: (Independently Interpreted)   Initial Reading: No STEMI. Rhythm: Sinus Tachycardia. Heart Rate: 105. Axis: Left Axis Deviation. Other Findings: Prolonged QT Interval. Clinical Impression: Sinus Tachycardia and Left Ventricular Hypertrophy (LDH)   09/12/2024 @ 1851     ECG Results              EKG 12-lead (Final result)        Collection Time Result Time QRS Duration OHS QTC Calculation    09/12/24 18:51:52 09/12/24 20:42:51 90 526                     Final result by Interface, Lab In [facility] (09/12/24 20:42:58)                   Narrative:    Test Reason : R07.9,    Vent. Rate : 105 BPM     Atrial Rate : 105 BPM     P-R Int : 156 ms          QRS Dur : 090 ms      QT Int : 398 ms       P-R-T Axes : 037 -22 024 degrees     QTc Int : 526 ms    Sinus tachycardia  Minimal voltage criteria for LVH, may be normal variant ( R in aVL )  Nonspecific T wave abnormality  Prolonged QT  Abnormal ECG  No previous ECGs available  Confirmed by Claudio Hawkins MD ([id number]) on 9/12/2024 8:42:47 PM    Referred By: [email or web address]   SELF           Confirmed By:Claudio Hawkins MD                                  Imaging Results              X-Ray Chest AP Portable (Final result)  Result time 09/12/24 20:30:28      Final result by Riley Don MD (09/12/24 20:30:28)                   Impression:      No acute abnormality.      Electronically signed by: Riley Don MD  Date:    09/12/2024  Time:    20:30               Narrative:    EXAMINATION:  XR CHEST AP PORTABLE    CLINICAL HISTORY:  Shortness of  breath    TECHNIQUE:  Single frontal view of the chest was performed.    COMPARISON:  None    FINDINGS:  The lungs are clear, with normal appearance of pulmonary vasculature and no pleural effusion or pneumothorax.    The cardiac silhouette is normal in size. The hilar and mediastinal contours are unremarkable.    Bones are intact.                                       Medications   furosemide injection 60 mg (has no administration in time range)   aspirin EC tablet 325 mg (has no administration in time range)     Medical Decision Making  Amount and/or Complexity of Data Reviewed  Labs: ordered.  Radiology: ordered.    Risk  OTC drugs.  Prescription drug management.  Decision regarding hospitalization.      ED assessment:    ***    Differential diagnosis (including but not limited to):   ***    ED management: ***    My independent radiology interpretation: ***  Point of care US (independently performed and interpreted): ***  Decision rules/clinical scoring: ***    Amount and/or Complexity of Data Reviewed  Independent historian: {MDMINDEPENDENTHISTORIAN:[id number]}   Summary of history: ***  External data reviewed: notes from previous ED visits, prior labs, and prior imaging  Summary of data reviewed:     Labs from this morning:  High sensitivity troponin 141   WBC 8.7, hemoglobin 14.3, hematocrit 44.7, platelets 265   D-dimer 1003  Sodium 140, potassium 3.9, chloride 108, CO2 25, BUN 17, creatinine 1.32   BNP 1482  Chest x-ray: Cardiomegaly without ant CHF  CT angiogram of the chest:  Negative for pulmonary embolus.  Left thyroid quarter unchanged.  Cardiomegaly without ant CHF  Risk and benefits of testing: discussed   Labs: ordered and reviewed  Radiology: ordered and independent interpretation performed (see above or ED course)  ECG/medicine tests: ordered and independent interpretation performed (see above or ED course)  Discussion of management or test interpretation with external provider(s): discussed with  hospitalist physician   Summary of discussion: ***    Risk  Prescription drug management   Decision regarding hospitalization  Shared decision making     Critical Care  none    Peg OSORIO MD personally performed the history, PE, MDM, and procedures as documented above and agree with the scribe's documentation.           Scribe Attestation:   Scribe #1: I performed the above scribed service and the documentation accurately describes the services I performed. I attest to the accuracy of the note.    Attending Attestation:           Physician Attestation for Scribe:  Physician Attestation Statement for Scribe #1: Rocío OSORIO Kayla O, MD, reviewed documentation, as scribed by John Bui in my presence, and it is both accurate and complete.             ED Course as of 09/12/24 2257   Thu Sep 12, 2024   2129 Received the records from OG H visit this morning.  Presented with complaint of fall at home, chest pain and shortness of breath.  Workup there with elevated BNP, elevated high sensitivity troponin, elevated D-dimer.  CT angiogram was performed which demonstrated cardiomegaly, no indication of overt congestive failure, no evidence of thoracic trauma.  It was documented that he became very anxious so he was given Ativan, his blood pressure dropped and he became weak and dizzy so they gave IV fluids.  Blood pressure improved, dizziness and weakness resolved and chest pain resolved as well so he was discharged home with a diagnosis of hypertension, chest wall pain and demand ischemia of myocardium. [KS]   2130 Hospital medicine paged [BG]   6369 Discussed with Suellen Mathews NP with Hospital Medicine who accepts for admission for IV diuresis, cardiac monitoring [KS]      ED Course User Index  [BG] John Bui  [KS] Peg Alford MD                           Clinical Impression:  Final diagnoses:  [R06.02] Shortness of breath

## 2024-09-14 VITALS
HEIGHT: 70 IN | SYSTOLIC BLOOD PRESSURE: 122 MMHG | OXYGEN SATURATION: 97 % | BODY MASS INDEX: 45.1 KG/M2 | DIASTOLIC BLOOD PRESSURE: 75 MMHG | TEMPERATURE: 97 F | HEART RATE: 92 BPM | RESPIRATION RATE: 18 BRPM | WEIGHT: 315 LBS

## 2024-09-14 PROBLEM — R07.9 CHEST PAIN: Status: ACTIVE | Noted: 2024-09-14

## 2024-09-14 PROBLEM — R07.9 CHEST PAIN: Status: RESOLVED | Noted: 2024-09-14 | Resolved: 2024-09-14

## 2024-09-14 LAB
ALBUMIN SERPL-MCNC: 3.6 G/DL (ref 3.5–5)
ALBUMIN/GLOB SERPL: 1.4 RATIO (ref 1.1–2)
ALP SERPL-CCNC: 85 UNIT/L (ref 40–150)
ALT SERPL-CCNC: 40 UNIT/L (ref 0–55)
ANION GAP SERPL CALC-SCNC: 9 MEQ/L
AST SERPL-CCNC: 36 UNIT/L (ref 5–34)
BASOPHILS # BLD AUTO: 0.04 X10(3)/MCL
BASOPHILS NFR BLD AUTO: 0.4 %
BILIRUB SERPL-MCNC: 0.6 MG/DL
BUN SERPL-MCNC: 23 MG/DL (ref 8.9–20.6)
CALCIUM SERPL-MCNC: 9.1 MG/DL (ref 8.4–10.2)
CHLORIDE SERPL-SCNC: 104 MMOL/L (ref 98–107)
CO2 SERPL-SCNC: 27 MMOL/L (ref 22–29)
CREAT SERPL-MCNC: 1.41 MG/DL (ref 0.73–1.18)
CREAT/UREA NIT SERPL: 16
EOSINOPHIL # BLD AUTO: 0.04 X10(3)/MCL (ref 0–0.9)
EOSINOPHIL NFR BLD AUTO: 0.4 %
ERYTHROCYTE [DISTWIDTH] IN BLOOD BY AUTOMATED COUNT: 14.7 % (ref 11.5–17)
GFR SERPLBLD CREATININE-BSD FMLA CKD-EPI: >60 ML/MIN/1.73/M2
GLOBULIN SER-MCNC: 2.6 GM/DL (ref 2.4–3.5)
GLUCOSE SERPL-MCNC: 85 MG/DL (ref 74–100)
HCT VFR BLD AUTO: 39.8 % (ref 42–52)
HGB BLD-MCNC: 12.9 G/DL (ref 14–18)
IMM GRANULOCYTES # BLD AUTO: 0.04 X10(3)/MCL (ref 0–0.04)
IMM GRANULOCYTES NFR BLD AUTO: 0.4 %
LYMPHOCYTES # BLD AUTO: 2.56 X10(3)/MCL (ref 0.6–4.6)
LYMPHOCYTES NFR BLD AUTO: 25.6 %
MAGNESIUM SERPL-MCNC: 1.8 MG/DL (ref 1.6–2.6)
MCH RBC QN AUTO: 27 PG (ref 27–31)
MCHC RBC AUTO-ENTMCNC: 32.4 G/DL (ref 33–36)
MCV RBC AUTO: 83.4 FL (ref 80–94)
MONOCYTES # BLD AUTO: 0.72 X10(3)/MCL (ref 0.1–1.3)
MONOCYTES NFR BLD AUTO: 7.2 %
NEUTROPHILS # BLD AUTO: 6.61 X10(3)/MCL (ref 2.1–9.2)
NEUTROPHILS NFR BLD AUTO: 66 %
NRBC BLD AUTO-RTO: 0 %
PLATELET # BLD AUTO: 245 X10(3)/MCL (ref 130–400)
PMV BLD AUTO: 9.7 FL (ref 7.4–10.4)
POTASSIUM SERPL-SCNC: 3.8 MMOL/L (ref 3.5–5.1)
PROT SERPL-MCNC: 6.2 GM/DL (ref 6.4–8.3)
RBC # BLD AUTO: 4.77 X10(6)/MCL (ref 4.7–6.1)
SODIUM SERPL-SCNC: 140 MMOL/L (ref 136–145)
WBC # BLD AUTO: 10.01 X10(3)/MCL (ref 4.5–11.5)

## 2024-09-14 PROCEDURE — 93010 ELECTROCARDIOGRAM REPORT: CPT | Mod: ,,, | Performed by: INTERNAL MEDICINE

## 2024-09-14 PROCEDURE — 63600175 PHARM REV CODE 636 W HCPCS: Performed by: INTERNAL MEDICINE

## 2024-09-14 PROCEDURE — 36415 COLL VENOUS BLD VENIPUNCTURE: CPT | Performed by: INTERNAL MEDICINE

## 2024-09-14 PROCEDURE — 25000003 PHARM REV CODE 250

## 2024-09-14 PROCEDURE — 63600175 PHARM REV CODE 636 W HCPCS: Performed by: NURSE PRACTITIONER

## 2024-09-14 PROCEDURE — 25000003 PHARM REV CODE 250: Performed by: INTERNAL MEDICINE

## 2024-09-14 PROCEDURE — 80053 COMPREHEN METABOLIC PANEL: CPT | Performed by: INTERNAL MEDICINE

## 2024-09-14 PROCEDURE — 85025 COMPLETE CBC W/AUTO DIFF WBC: CPT | Performed by: INTERNAL MEDICINE

## 2024-09-14 PROCEDURE — 93005 ELECTROCARDIOGRAM TRACING: CPT

## 2024-09-14 PROCEDURE — 83735 ASSAY OF MAGNESIUM: CPT | Performed by: INTERNAL MEDICINE

## 2024-09-14 RX ORDER — METOPROLOL SUCCINATE 100 MG/1
100 TABLET, EXTENDED RELEASE ORAL 2 TIMES DAILY
Qty: 180 TABLET | Refills: 3 | Status: SHIPPED | OUTPATIENT
Start: 2024-09-14 | End: 2025-09-14

## 2024-09-14 RX ADMIN — DAPAGLIFLOZIN 10 MG: 10 TABLET, FILM COATED ORAL at 08:09

## 2024-09-14 RX ADMIN — ISOSORBIDE DINITRATE: 20 TABLET ORAL at 08:09

## 2024-09-14 RX ADMIN — METOPROLOL SUCCINATE 100 MG: 50 TABLET, EXTENDED RELEASE ORAL at 08:09

## 2024-09-14 RX ADMIN — SACUBITRIL AND VALSARTAN 1 TABLET: 24; 26 TABLET, FILM COATED ORAL at 08:09

## 2024-09-14 RX ADMIN — AMLODIPINE BESYLATE 10 MG: 5 TABLET ORAL at 08:09

## 2024-09-14 RX ADMIN — ENOXAPARIN SODIUM 40 MG: 40 INJECTION SUBCUTANEOUS at 08:09

## 2024-09-14 RX ADMIN — FUROSEMIDE 60 MG: 10 INJECTION, SOLUTION INTRAMUSCULAR; INTRAVENOUS at 05:09

## 2024-09-14 RX ADMIN — FUROSEMIDE 60 MG: 10 INJECTION, SOLUTION INTRAMUSCULAR; INTRAVENOUS at 12:09

## 2024-09-14 NOTE — PROGRESS NOTES
[facility] - 6th Floor Medical Telemetry    Cardiology  Progress Note    Patient Name: Michael Conti  MRN: [id number]  Admission Date: 9/12/2024  Hospital Length of Stay: 2 days  Code Status: Full Code   Attending Provider: No att. providers found   Consulting Provider: ANISH Koch  Primary Care Physician: Lele Verde NP  Principal Problem:Chest pain    Patient information was obtained from patient, past medical records, ER records, and primary team.     Subjective:     Chief Complaint/Reason for Consult: NSTEMI, CHF    HPI: Mr. Conti is a 25 y/o male with a history of CHF, HTN, PHTN, NICMO who is known to [facility], Dr. Flynn. He presented to the ER on 9.12.24 with complaints of chest pain. He describes chest pain as a tightness. He reports he gets this pain when he is fluid overloaded. He reports cough with red tinged sputum. He [place] [facility] [place] after a fall and he was given lasix and discharged. Significant labs include BUN/Creat 1.92, BNP 3367.3, Troponin (0.605). CXR unremarkable. EKG shows sinus tachycardia with LVH, prolonged , nonspecific T wave abnormality. [facility] has been consulted for NSTEMI and CHF.      Hospital Course:  9.14.24: Sitting up in bedside chair. NAD. Appears euvolemic. Plans for Dc home today      PMH: CHF, HTN, PHTN, NICMO, ADHD, Morbid Obesity  PSH: RHC, C   Family History: Mother - Heart Failure   Social History: Denies illicit drug use, alcohol use, and smoking.     Previous Cardiac Diagnostics:   ECHO (9.13.24):  Left Ventricle: The left ventricle is severely dilated. Severe global hypokinesis present. There is severely reduced systolic function with a visually estimated ejection fraction of 10 -15%. There is diastolic dysfunction. Right Ventricle: Normal right ventricular cavity size. Systolic function is mildly reduced. Left Atrium: Left atrium is severely dilated. Right Atrium: Right atrium is moderately dilated. Mitral  Valve: There is mild regurgitation. Tricuspid Valve: There is mild regurgitation with the RVSP of 35mmHg. Pulmonic Valve: There is mild regurgitation. IVC/SVC: Elevated venous pressure at 15 mmHg.    LHC (7.23.24):   Left Ventricle: The left ventricle is severely dilated. Moderately increased ventricular mass. Normal wall thickness. There is moderate eccentric hypertrophy. Global hypokinesis present. There is severely reduced systolic function with a visually estimated ejection fraction of 10 -15%. Ejection fraction by visual approximation is 15%. Grade II diastolic dysfunction. Possible thrombus in the apex that is non-mobile and only seen on contrast ( both Dr Simon and I think likely not but there is enough suspicion that we cannot exclude). Right Ventricle: Mild right ventricular enlargement. Wall thickness is normal. Systolic function is borderline low to slightly reduced despite the normal TAPSE. Left Atrium: Left atrium is severely dilated. Right Atrium: Right atrium is mildly dilated. Mitral Valve: There is mild to moderate regurgitation. Tricuspid Valve: There is mild regurgitation. Pulmonary Artery: There is mild pulmonary hypertension. The estimated pulmonary artery systolic pressure is 44 mmHg. IVC/SVC: Normal venous pressure at 3 mmHg.    RHC/LHC (2.29.24):  LMCA: Normal. LAD: Normal. LCX: Normal. RCA: Normal     MPI (2.20.24):  This is an abnormal perfusion study. Study is consistent with ischemia. This scan is suggestive of high risk for future cardiovascular events. Medium reversible perfusion abnormality of moderate intensity in the anterior region. Large reversible perfusion abnormality of moderate intensity in the inferior region. Small reversible perfusion abnormality of moderate intensity in the basal david lateral segment. Medium reversible perfusion abnormality of moderate intensity in the inferior lateral region. Perfusion imaging is suggestive of three vessel disease. Perfusion defect is in  the distribution of left anterior descending artery, right coronary artery and left circumflex artery. The study quality is average. The left ventricular cavity is noted to be markedly enlarged on the stress study. The left ventricular ejection fraction was calculated to be 20% and left ventricular global function is severely reduced.    No past medical history on file.  No past surgical history on file.  Review of patient's allergies indicates:  No Known Allergies  No current facility-administered medications on file prior to encounter.     Current Outpatient Medications on File Prior to Encounter   Medication Sig    amLODIPine (NORVASC) 10 MG tablet Take 10 mg by mouth once daily.    benzonatate (TESSALON) 100 MG capsule Take 100 mg by mouth 3 (three) times daily as needed.    ENTRESTO 24-26 mg per tablet Take 1 tablet by mouth 2 (two) times daily.    FARXIGA 10 mg tablet Take 10 mg by mouth once daily.    nitroGLYCERIN (NITROSTAT) 0.4 MG SL tablet Place 0.4 mg under the tongue every 5 (five) minutes as needed.    potassium chloride (KLOR-CON) 10 MEQ TbSR Take 10 mEq by mouth once daily.    traZODone (DESYREL) 50 MG tablet Take 50 mg by mouth nightly as needed.       Review of Systems   Respiratory:  Negative for chest tightness and shortness of breath.    Cardiovascular:  Negative for chest pain, palpitations and leg swelling.   All other systems reviewed and are negative.      Objective:     Vital Signs (Most Recent):  Temp: 97.4 °F (36.3 °C) (09/14/24 1156)  Pulse: 92 (09/14/24 1156)  Resp: 18 (09/14/24 1156)  BP: 122/75 (09/14/24 1156)  SpO2: 97 % (09/14/24 1156) Vital Signs (24h Range):  Temp:  [97.4 °F (36.3 °C)-98.3 °F (36.8 °C)] 97.4 °F (36.3 °C)  Pulse:  [89-99] 92  Resp:  [18-20] 18  SpO2:  [94 %-97 %] 97 %  BP: (100-158)/(63-98) 122/75   Weight: (!) 158.6 kg (349 lb 10.4 oz)  Body mass index is 50.17 kg/m².  SpO2: 97 %       Intake/Output Summary (Last 24 hours) at 9/14/2024 6915  Last data filed at  "9/14/2024 0851  Gross per 24 hour   Intake 680 ml   Output 3150 ml   Net -2470 ml     Lines/Drains/Airways       Peripheral Intravenous Line  Duration                  Peripheral IV - Single Lumen 09/13/24 1530 20 G Anterior;Right Wrist 1 day                  Significant Labs:   Chemistries:   Recent Labs   Lab 09/12/24 2040 09/13/24  0507 09/13/24  0934 09/13/24  1424 09/14/24  0336    139  --   --  140   K 4.2 4.6  --   --  3.8   * 106  --   --  104   CO2 22 23  --   --  27   BUN 22.8* 25.8*  --   --  23.0*   CREATININE 2.05* 1.92*  --   --  1.41*   CALCIUM 9.2 9.1  --   --  9.1   BILITOT 0.7 0.6  --   --  0.6   ALKPHOS 89 89  --   --  85   ALT 31 30  --   --  40   AST 29 32  --   --  36*   GLUCOSE 92 92  --   --  85   MG  --  2.00  --   --  1.80   PHOS  --  5.5*  --   --   --    TROPONINI 0.231* 0.605* 0.555* 0.674*  --         CBC/Anemia Labs: Coags:    Recent Labs   Lab 09/12/24 2040 09/13/24  0507 09/14/24  0336   WBC 10.81 10.22 10.01   HGB 13.6* 13.8* 12.9*   HCT 42.2 43.5 39.8*    269 245   MCV 84.1 85.0 83.4   RDW 15.3 15.0 14.7    No results for input(s): "PT", "INR", "APTT" in the last 168 hours.     Significant Imaging:  Imaging Results              X-Ray Chest AP Portable (Final result)  Result time 09/12/24 20:30:28      Final result by Riley Don MD (09/12/24 20:30:28)                   Impression:      No acute abnormality.      Electronically signed by: Riley Don MD  Date:    09/12/2024  Time:    20:30               Narrative:    EXAMINATION:  XR CHEST AP PORTABLE    CLINICAL HISTORY:  Shortness of breath    TECHNIQUE:  Single frontal view of the chest was performed.    COMPARISON:  None    FINDINGS:  The lungs are clear, with normal appearance of pulmonary vasculature and no pleural effusion or pneumothorax.    The cardiac silhouette is normal in size. The hilar and mediastinal contours are unremarkable.    Bones are intact.                                "         Telemetry:  SR    Physical Exam  Vitals and nursing note reviewed.   HENT:      Head: Normocephalic.      Mouth/Throat:      Mouth: Mucous membranes are moist.   Eyes:      Extraocular Movements: Extraocular movements intact.   Cardiovascular:      Rate and Rhythm: Normal rate and regular rhythm.      Pulses: Normal pulses.      Heart sounds: Murmur heard.   Pulmonary:      Effort: Pulmonary effort is normal.   Abdominal:      Palpations: Abdomen is soft.   Musculoskeletal:         General: Normal range of motion.      Cervical back: Normal range of motion.   Skin:     General: Skin is warm.   Neurological:      Mental Status: He is alert and oriented to person, place, and time.   Psychiatric:         Mood and Affect: Mood normal.         Behavior: Behavior normal.       Home Medications:   No current facility-administered medications on file prior to encounter.     Current Outpatient Medications on File Prior to Encounter   Medication Sig Dispense Refill    amLODIPine (NORVASC) 10 MG tablet Take 10 mg by mouth once daily.      benzonatate (TESSALON) 100 MG capsule Take 100 mg by mouth 3 (three) times daily as needed.      ENTRESTO 24-26 mg per tablet Take 1 tablet by mouth 2 (two) times daily.      FARXIGA 10 mg tablet Take 10 mg by mouth once daily.      nitroGLYCERIN (NITROSTAT) 0.4 MG SL tablet Place 0.4 mg under the tongue every 5 (five) minutes as needed.      potassium chloride (KLOR-CON) 10 MEQ TbSR Take 10 mEq by mouth once daily.      traZODone (DESYREL) 50 MG tablet Take 50 mg by mouth nightly as needed.       Current Schedule Inpatient Medications:   amLODIPine  10 mg Oral Daily    dapagliflozin propanediol  10 mg Oral Daily    enoxparin  40 mg Subcutaneous Q12H (prophylaxis, 0900/2100)    furosemide (LASIX) injection  60 mg Intravenous Q8H    hydrALAZINE 10 mg 1 tablet, hydrALAZINE 25 mg 1 tablet, isorsorbide dinitrate 20 mg 1 tablet combination   Oral TID    metoprolol succinate  100 mg Oral  BID    perflutren lipid microspheres  3 mL Intravenous Once    sacubitriL-valsartan  1 tablet Oral BID     Continuous Infusions:    Assessment:   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     - BNP 3367.3  NSTEMI type II in the setting of Heart Failure     - Troponin peaked at 0.674  Prolonged QTC    - 526 on EKG  CKD III  HTN  PHTN  NICMO    - EF 10-15%    - LHC (2.29.24): Normal Coronaries   VHD    - Mild MR/TR  ADHD  Morbid Obesity  No known history of GI Bleed     Plan:   Continue Entresto  mg bid, Toprol 100 mg bid, and Farxiga 10 mg daily  RHC and CPX study planned on 9.24.24 in [place] with Dr. Banks   Patient has been denied LVAD and OHT secondary to Elevated BMI  Patient inquired about being placed on Ozempic or Trulicity for Weight Loss; Instructed patient to talk with his PCP or Primary Cardiologist   Keep Mag > 2 and Potassium > 4  Labs and EKG in AM: CBC, BMP, and Mag     Stable for Dc home. F/u with Dr. Gee Bejarano, P  Cardiology  [facility] - 6th Floor Medical Telemetry  09/14/2024

## 2024-09-14 NOTE — DISCHARGE SUMMARY
"[facility] - 6th [facility] Medicine  Discharge Summary      Patient Name: Michael Conti  MRN: [id number]  [facility]: [id number]  Patient Class: IP- Inpatient  Admission Date: 9/12/2024  Hospital Length of Stay: 2 days  Discharge Date and Time:  09/14/2024 12:10 PM  Attending Physician: Kodi Tran MD   Discharging Provider: Castillo Parikh MD  Primary Care Provider: Lele Verde NP    Primary Care Team: Networked reference to record PCT     Michael Conti is a 24 y.o. male who PMH includes HTN, CHF; obesity; presents to the ED at [facility] on 9/12/2024 with a primary complaint of chest pain and associated shortness of breath with hemoptysis.  PT was seen at [facility] earlier in the day of 9/12/2024 for same complaints, given IV lasix and d/c home. Pt reports the symptoms persisted with associated which he called EMS and was brought ot [facility] for further evaluation. Pr reports productive cough with "red" sputum. No reports of fever, chills, diarrhea, black tarry stool, melena, bright red bleeding per rectum or any gross hematemesis.  He did report nausea with coughing up blood-tinged sputum.  Patient reports he is compliant with his home medication.  He reports no excessive weight gain.  Initial vital signs /70 pulse 106 respirations 24 temperature 98.2° F O2 saturation 97% on room air.  Lab work reviewed demonstrated chloride 108, BUN 22.8, creatinine 2.05, BNP 3367.3, troponin 0.231 with repeat values 0.605, 0.555; other indices unremarkable.  Chest x-ray impression reviewed demonstrated no acute abnormality.  Patient received Lasix , weight based Lovenox and 325 mg aspirin in the ED. patient is admitted to hospital medicine services for further management of chf exacerbation. TTE showed EF 10-15%, diastolic dysfunction, MR, TR. IV diuresis was continued and CIS adjusted GDMT. Beta blocker was added and entresto, Farxiga were continued. He " responded well with diuresis and will be dc to home today. He has an upcoming appointment in [place] on 9/24/24 for RHC and CPX. Counsled on benefits of weight loss. Necessary prescriptions were provided.     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%  Nstemi type II due to above  - chest pain at admission- resolved  prolonged Qtc    Hx; CKD III, Obesity, HTN, VHD, ADHD    Goals of Care Treatment Preferences:  Code Status: Full Code  Vitals:    09/14/24 1156   BP: 122/75   Pulse: 92   Resp: 18   Temp: 97.4 °F (36.3 °C)       General: Comfortable, not in distress  Respiratory: Clear to auscultation bilaterally, nonlabored breathing  Cardiovascular: RRR, S1, S2  Abdominal: Soft, nontender, nondistended  Neurological: AOx4, no focal deficits  Psychiatric: Cooperative         Consults:   Consults (From admission, onward)          Status Ordering Provider     Inpatient consult to Cardiology  Once        Provider:  Micky Oquendo MD    Acknowledged MARIAN SWEET A     Inpatient consult to Cardiology  Once        Provider:  Micky Oquendo MD    Completed DARWIN SANTOS            No new Assessment & Plan notes have been filed under this hospital service since the last note was generated.  Service: Hospital Medicine    Final Active Diagnoses:      Problems Resolved During this Admission:    Diagnosis Date Noted Date Resolved POA    PRINCIPAL PROBLEM:  Chest pain [R07.9] 09/14/2024 09/14/2024 Yes       Discharged Condition: good    Disposition: Home or Self Care    Follow Up:   Follow-up Information       Lele Verde NP Follow up in 4 week(s).    Specialty: Internal Medicine  Contact information:  [address] [place] [address] [phone]                           Patient Instructions:   No discharge procedures on file.    Significant Diagnostic Studies: Labs: CMP   Recent Labs   Lab 09/12/24  2040 09/13/24  0507 09/14/24  0336    139 140   K 4.2 4.6 3.8   * 106 104   CO2  22 23 27   BUN 22.8* 25.8* 23.0*   CREATININE 2.05* 1.92* 1.41*   CALCIUM 9.2 9.1 9.1   ALBUMIN 3.5 3.7 3.6   BILITOT 0.7 0.6 0.6   ALKPHOS 89 89 85   AST 29 32 36*   ALT 31 30 40       Pending Diagnostic Studies:       None           Medications:  Reconciled Home Medications:      Medication List        START taking these medications      metoprolol succinate 100 MG 24 hr tablet  Commonly known as: TOPROL-XL  Take 1 tablet (100 mg total) by mouth 2 (two) times daily.            CONTINUE taking these medications      amLODIPine 10 MG tablet  Commonly known as: NORVASC  Take 10 mg by mouth once daily.     benzonatate 100 MG capsule  Commonly known as: TESSALON  Take 100 mg by mouth 3 (three) times daily as needed.     ENTRESTO 24-26 mg per tablet  Generic drug: sacubitriL-valsartan  Take 1 tablet by mouth 2 (two) times daily.     FARXIGA 10 mg tablet  Generic drug: dapagliflozin propanediol  Take 10 mg by mouth once daily.     nitroGLYCERIN 0.4 MG SL tablet  Commonly known as: NITROSTAT  Place 0.4 mg under the tongue every 5 (five) minutes as needed.     potassium chloride 10 MEQ Tbsr  Commonly known as: KLOR-CON  Take 10 mEq by mouth once daily.     traZODone 50 MG tablet  Commonly known as: DESYREL  Take 50 mg by mouth nightly as needed.              Indwelling Lines/Drains at time of discharge:   Lines/Drains/Airways       None                   Time spent on the discharge of patient: 35 minutes         Castillo Parikh MD  Department of Hospital Medicine  [facility] - 6th [facility] Medical Telemetry

## 2024-09-14 NOTE — NURSING
Discharge education along with education on new meds given to patient. Patient expressed understanding.

## 2024-09-14 NOTE — NURSING
Nurses Note -- 4 Eyes      9/14/2024   7:09 AM      Skin assessed during: Q Shift Change      [x] No Altered Skin Integrity Present    []Prevention Measures Documented      [] Yes- Altered Skin Integrity Present or Discovered   [] LDA Added if Not in Epic (Describe Wound)   [] New Altered Skin Integrity was Present on Admit and Documented in LDA   [] Wound Image Taken    Wound Care Consulted? No    Attending Nurse:  Isaura Arteaga RN/Staff Member:  Sloane

## 2024-09-14 NOTE — NURSING
Nurses Note -- 4 Eyes      9/13/2024   19:00 PM      Skin assessed during: Q Shift Change      [x] No Altered Skin Integrity Present    [x]Prevention Measures Documented      [] Yes- Altered Skin Integrity Present or Discovered   [] LDA Added if Not in Epic (Describe Wound)   [] New Altered Skin Integrity was Present on Admit and Documented in LDA   [] Wound Image Taken    Wound Care Consulted? No    Attending Nurse:  Sloane Arteaga RN/Staff Member:  Isaura

## 2024-09-16 LAB
OHS QRS DURATION: 92 MS
OHS QTC CALCULATION: 524 MS

## 2024-09-24 ENCOUNTER — HOSPITAL ENCOUNTER (OUTPATIENT)
Facility: HOSPITAL | Age: 24
Discharge: HOME | End: 2024-09-24
Attending: INTERNAL MEDICINE | Admitting: INTERNAL MEDICINE
Payer: MEDICAID

## 2024-09-24 ENCOUNTER — HOSPITAL ENCOUNTER (OUTPATIENT)
Dept: CARDIOLOGY | Facility: HOSPITAL | Age: 24
Discharge: HOME | End: 2024-09-24
Attending: INTERNAL MEDICINE | Admitting: INTERNAL MEDICINE
Payer: MEDICAID

## 2024-09-24 VITALS
HEART RATE: 125 BPM | DIASTOLIC BLOOD PRESSURE: 90 MMHG | WEIGHT: 315 LBS | HEIGHT: 70 IN | SYSTOLIC BLOOD PRESSURE: 124 MMHG | BODY MASS INDEX: 45.1 KG/M2

## 2024-09-24 DIAGNOSIS — I50.43 ACUTE ON CHRONIC COMBINED SYSTOLIC AND DIASTOLIC CONGESTIVE HEART FAILURE: ICD-10-CM

## 2024-09-24 LAB
CV STRESS BASE HR: 125 BPM
DIASTOLIC BLOOD PRESSURE: 90 MMHG
OHS CV CPX 1 MINUTE RECOVERY HEART RATE: 133 BPM
OHS CV CPX 85 PERCENT MAX PREDICTED HEART RATE MALE: 167
OHS CV CPX DATA GRADE - PEAK: 1.7
OHS CV CPX DATA O2 SAT - PEAK: 96
OHS CV CPX DATA O2 SAT - REST: 98
OHS CV CPX DATA SPEED - PEAK: 1.9
OHS CV CPX DATA TIME - PEAK: 2.55
OHS CV CPX DATA VE/VCO2 - PEAK: 42
OHS CV CPX DATA VE/VO2 - PEAK: 30
OHS CV CPX DATA VO2 - PEAK: 9
OHS CV CPX DATA VO2 - REST: 3.8
OHS CV CPX FEV1/FVC: 0.86
OHS CV CPX FORCED EXPIRATORY VOLUME: 1.68
OHS CV CPX FORCED VITAL CAPACITY (FVC): 1.96
OHS CV CPX HIGHEST VO: 43.9
OHS CV CPX MAX PREDICTED HEART RATE: 196
OHS CV CPX MAXIMAL VOLUNTARY VENTILATION (MVV) PREDICTED: 67.2
OHS CV CPX MAXIMAL VOLUNTARY VENTILATION (MVV): 55
OHS CV CPX MAXIUMUM EXERCISE VENTILATION (VE MAX): 43.8
OHS CV CPX PATIENT AGE: 24
OHS CV CPX PATIENT HEIGHT IN: 70
OHS CV CPX PATIENT IS FEMALE AGE 11-19: 0
OHS CV CPX PATIENT IS FEMALE AGE GREATER THAN 19: 0
OHS CV CPX PATIENT IS FEMALE AGE LESS THAN 11: 0
OHS CV CPX PATIENT IS FEMALE: 0
OHS CV CPX PATIENT IS MALE AGE 11-25: 1
OHS CV CPX PATIENT IS MALE AGE GREATER THAN 25: 0
OHS CV CPX PATIENT IS MALE AGE LESS THAN 11: 0
OHS CV CPX PATIENT IS MALE GREATER THAN 18: 1
OHS CV CPX PATIENT IS MALE LESS THAN OR EQUAL TO 18: 0
OHS CV CPX PATIENT IS MALE: 1
OHS CV CPX PATIENT WEIGHT RETURNED IN OZ: 5680
OHS CV CPX PEAK DIASTOLIC BLOOD PRESSURE: 100 MMHG
OHS CV CPX PEAK HEAR RATE: 137 BPM
OHS CV CPX PEAK RATE PRESSURE PRODUCT: NORMAL
OHS CV CPX PEAK SYSTOLIC BLOOD PRESSURE: 165 MMHG
OHS CV CPX PERCENT BODY FAT: 29.6
OHS CV CPX PERCENT MAX PREDICTED HEART RATE ACHIEVED: 70
OHS CV CPX PREDICTED VO2: 43.9 ML/KG/MIN
OHS CV CPX RATE PRESSURE PRODUCT PRESENTING: NORMAL
OHS CV CPX REST PET CO2: 26
OHS CV CPX VE/VCO2 SLOPE: 41.7
STRESS ECHO POST EXERCISE DUR MIN: 2 MINUTES
STRESS ECHO POST EXERCISE DUR SEC: 33 SECONDS
SYSTOLIC BLOOD PRESSURE: 124 MMHG

## 2024-09-24 PROCEDURE — 94621 CARDIOPULM EXERCISE TESTING: CPT | Mod: 26,,, | Performed by: INTERNAL MEDICINE

## 2024-09-24 PROCEDURE — 94621 CARDIOPULM EXERCISE TESTING: CPT

## 2024-09-24 PROCEDURE — 99499 UNLISTED E&M SERVICE: CPT | Mod: ,,, | Performed by: INTERNAL MEDICINE

## 2024-09-24 NOTE — H&P
[facility] - Short Stay Cardiac Unit  Interventional Cardiology  H&P    Patient Name: Michael Conti  MRN: [id number]  Admission Date: 9/24/2024  Code Status: Prior   Attending Provider: Adiel Triana MD  Primary Care Physician: Lele Verde NP  Principal Problem:<principal problem not specified>    Patient information was obtained from patient and past medical records.     Subjective:     Chief Complaint:  CHF     HPI: 25 YO M who was seen by Dr. Banks 2 months prior. Referred for advanced therapies evaluation but not a candidate due to BMI of 50. Patient states he was diagnosed with decompensated heart failure approximately 4 years ago, etiology unknown to him. He has had a few admissions over the last year, states he just left the hospital last Thursday due to ADH F, spent 2 days in the hospital. Class 3 symptoms. On good GDMT. Followed by Dr. Gee García MD locally. Here for RHC.    No past medical history on file.    No past surgical history on file.    Review of patient's allergies indicates:  No Known Allergies    PTA Medications   Medication Sig    amLODIPine (NORVASC) 10 MG tablet Take 10 mg by mouth once daily.    benzonatate (TESSALON) 100 MG capsule Take 100 mg by mouth 3 (three) times daily as needed.    ENTRESTO 24-26 mg per tablet Take 1 tablet by mouth 2 (two) times daily.    FARXIGA 10 mg tablet Take 10 mg by mouth once daily.    metoprolol succinate (TOPROL-XL) 100 MG 24 hr tablet Take 1 tablet (100 mg total) by mouth 2 (two) times daily.    nitroGLYCERIN (NITROSTAT) 0.4 MG SL tablet Place 0.4 mg under the tongue every 5 (five) minutes as needed.    potassium chloride (KLOR-CON) 10 MEQ TbSR Take 10 mEq by mouth once daily.    traZODone (DESYREL) 50 MG tablet Take 50 mg by mouth nightly as needed.     Family History    None       Tobacco Use    Smoking status: Never     Passive exposure: Never    Smokeless tobacco: Never   Substance and Sexual Activity    Alcohol use: Yes    Drug use: Never     Sexual activity: Not on file     ROS  Objective:     Vital Signs (Most Recent):    Vital Signs (24h Range):           There is no height or weight on file to calculate BMI.            No intake or output data in the 24 hours ending 09/24/24 1142    Lines/Drains/Airways       None                   Physical Exam  Vitals reviewed.   Constitutional:       General: He is not in acute distress.     Appearance: He is obese.   HENT:      Head: Atraumatic.   Eyes:      Extraocular Movements: Extraocular movements intact.   Cardiovascular:      Rate and Rhythm: Normal rate and regular rhythm.      Heart sounds: Normal heart sounds.   Pulmonary:      Breath sounds: Normal breath sounds.   Abdominal:      Palpations: Abdomen is soft.      Tenderness: There is no abdominal tenderness.   Musculoskeletal:         General: Normal range of motion.      Right lower leg: No edema.      Left lower leg: No edema.   Neurological:      General: No focal deficit present.      Mental Status: He is alert and oriented to person, place, and time.         Significant Labs: BMP:   Recent Labs   Lab 09/24/24  1028   GLU 83      K 4.4      CO2 25   BUN 24*   CREATININE 1.5*   CALCIUM 9.8   , CMP   Recent Labs   Lab 09/24/24  1028      K 4.4      CO2 25   GLU 83   BUN 24*   CREATININE 1.5*   CALCIUM 9.8   PROT 6.9   ALBUMIN 3.7   BILITOT 0.7   ALKPHOS 89   AST 21   ALT 25   ANIONGAP 10   , CBC   Recent Labs   Lab 09/24/24  1028   WBC 7.76   HGB 13.8*   HCT 43.7      , and INR   Recent Labs   Lab 09/24/24  1028   INR 1.1       Significant Imaging: Echocardiogram: 2D echo with color flow doppler: No results found for this or any previous visit. and Transthoracic echo (TTE) complete (Cupid Only):   Results for orders placed or performed during the hospital encounter of 09/12/24   Echo   Result Value Ref Range    BSA 2.88 m2    Gregroio's Biplane MOD Ejection Fraction 18 %    A2C EF 14 %    A4C EF 22 %    LVOT stroke  volume 27.06 cm3    LVIDd 6.52 (A) 3.5 - 6.0 cm    LV Systolic Volume 186.93 mL    LV Systolic Volume Index 69.0 mL/m2    LVIDs 6.10 (A) 2.1 - 4.0 cm    LV ESV A2C 132.00 mL    LV Diastolic Volume 217.51 mL    LV ESV A4C 76.30 mL    LV Diastolic Volume Index 80.26 mL/m2    LV EDV A2C 308 mL    LV EDV A4C 344.00 mL    Left Ventricular End Systolic Volume by Teichholz Method 186.93 mL    Left Ventricular End Diastolic Volume by Teichholz Method 217.51 mL    IVS 1.42 (A) 0.6 - 1.1 cm    LVOT diameter 2.60 cm    LVOT area 5.3 cm2    FS 6 (A) 28 - 44 %    Left Ventricle Relative Wall Thickness 0.45 cm    Posterior Wall 1.47 (A) 0.6 - 1.1 cm    LV mass 463.25 g    LV Mass Index 171 g/m2    MV Peak E Arturo 0.77 m/s    TDI LATERAL 0.05 m/s    TDI SEPTAL 0.02 m/s    E/E' ratio 22.00 m/s    MV Peak A Arturo 0.46 m/s    TR Max Arturo 2.25 m/s    E/A ratio 1.67     E wave deceleration time 128.00 msec    LV SEPTAL E/E' RATIO 38.50 m/s    LV LATERAL E/E' RATIO 15.40 m/s    LVOT peak arturo 0.34 m/s    Left Ventricular Outflow Tract Mean Velocity 0.22 cm/s    Left Ventricular Outflow Tract Mean Gradient 0.00 mmHg    TAPSE 1.60 cm    RV/LV Ratio 0.53 cm    LA size 5.80 cm    LA Vol (MOD) 101.00 cm3    LA Volume Index (Mod) 37.3 mL/m2    AV mean gradient 2 mmHg    AV peak gradient 3 mmHg    Ao peak arturo 0.85 m/s    Ao VTI 14.30 cm    LVOT peak VTI 5.10 cm    AV valve area 1.89 cm²    AV Velocity Ratio 0.40     AV index (prosthetic) 0.36     KATIE by Velocity Ratio 2.12 cm²    MV mean gradient 3 mmHg    MV peak gradient 4 mmHg    MV stenosis pressure 1/2 time 67.00 ms    MV valve area p 1/2 method 3.28 cm2    MV valve area by continuity eq 1.53 cm2    MV VTI 17.7 cm    Triscuspid Valve Regurgitation Peak Gradient 20 mmHg    PV PEAK VELOCITY 0.58 m/s    PV peak gradient 1 mmHg    Mean e' 0.04 m/s    ZLVIDS -7.29     ZLVIDD -14.02     LA area A4C 26.30 cm2    LA area A2C 34.40 cm2    RVDD 3.43 cm    TV resting pulmonary artery pressure 35 mmHg     RV TB RVSP 17 mmHg    Est. RA pres 15 mmHg    Narrative      Left Ventricle: The left ventricle is severely dilated. Severe global   hypokinesis present. There is severely reduced systolic function with a   visually estimated ejection fraction of 10 -15%. There is diastolic   dysfunction.    Right Ventricle: Normal right ventricular cavity size. Systolic   function is mildly reduced.    Left Atrium: Left atrium is severely dilated.    Right Atrium: Right atrium is moderately dilated.    Mitral Valve: There is mild regurgitation.    Tricuspid Valve: There is mild regurgitation with the RVSP of 35mmHg.    Pulmonic Valve: There is mild regurgitation.    IVC/SVC: Elevated venous pressure at 15 mmHg.       Assessment and Plan:     There are no hospital problems to display for this patient.      CHF    VTE Risk Mitigation (From admission, onward)      None            Adiel Triana MD  Interventional Cardiology   [facility] - Short Stay Cardiac Unit

## 2024-09-24 NOTE — DISCHARGE SUMMARY
[facility] - Short Stay Cardiac Unit  Discharge Note  Short Stay    Procedure(s) (LRB):  INSERTION, CATHETER, RIGHT HEART (Right)      OUTCOME:  Mr. Conti did not take his meds/GDMT today. Noted to have SBP of 192-200 when checked multiple times. No headache or hypertensive emergency symptoms. Discussed with referring provider. Canceled procedure with plan to follow up with his primary cardiologist.    DISPOSITION: Home or Self Care    FINAL DIAGNOSIS:  <principal problem not specified>    FOLLOWUP: In clinic    DISCHARGE INSTRUCTIONS:  No discharge procedures on file.     TIME SPENT ON DISCHARGE: 30 minutes

## 2024-09-24 NOTE — PLAN OF CARE
B/p elevated in the 190's-200's systolic. Pt is asymptomatic and states he did not take his heart failure meds since yesterday morning at 10 am. Dr Triana at bedside and notified. Procedure cancelled. Pt discharged home

## 2024-11-11 DIAGNOSIS — I50.9 CONGESTIVE HEART FAILURE, UNSPECIFIED HF CHRONICITY, UNSPECIFIED HEART FAILURE TYPE: Primary | ICD-10-CM

## 2024-11-15 ENCOUNTER — OFFICE VISIT (OUTPATIENT)
Dept: TRANSPLANT | Facility: CLINIC | Age: 24
End: 2024-11-15
Payer: MEDICAID

## 2024-11-15 ENCOUNTER — LAB VISIT (OUTPATIENT)
Dept: LAB | Facility: HOSPITAL | Age: 24
End: 2024-11-15
Attending: INTERNAL MEDICINE
Payer: MEDICAID

## 2024-11-15 VITALS
HEIGHT: 70 IN | SYSTOLIC BLOOD PRESSURE: 135 MMHG | WEIGHT: 315 LBS | HEART RATE: 113 BPM | BODY MASS INDEX: 45.1 KG/M2 | DIASTOLIC BLOOD PRESSURE: 97 MMHG

## 2024-11-15 DIAGNOSIS — I50.9 CONGESTIVE HEART FAILURE, UNSPECIFIED HF CHRONICITY, UNSPECIFIED HEART FAILURE TYPE: ICD-10-CM

## 2024-11-15 DIAGNOSIS — I42.8 NON-ISCHEMIC CARDIOMYOPATHY: ICD-10-CM

## 2024-11-15 DIAGNOSIS — I50.43 ACUTE ON CHRONIC COMBINED SYSTOLIC AND DIASTOLIC HEART FAILURE: Primary | ICD-10-CM

## 2024-11-15 DIAGNOSIS — I10 PRIMARY HYPERTENSION: ICD-10-CM

## 2024-11-15 PROBLEM — I50.42 CHRONIC COMBINED SYSTOLIC AND DIASTOLIC HEART FAILURE: Status: ACTIVE | Noted: 2024-11-15

## 2024-11-15 LAB
ANION GAP SERPL CALC-SCNC: 10 MMOL/L (ref 8–16)
BUN SERPL-MCNC: 23 MG/DL (ref 6–20)
CALCIUM SERPL-MCNC: 8.8 MG/DL (ref 8.7–10.5)
CHLORIDE SERPL-SCNC: 104 MMOL/L (ref 95–110)
CO2 SERPL-SCNC: 25 MMOL/L (ref 23–29)
CREAT SERPL-MCNC: 1.4 MG/DL (ref 0.5–1.4)
EST. GFR  (NO RACE VARIABLE): >60 ML/MIN/1.73 M^2
GLUCOSE SERPL-MCNC: 132 MG/DL (ref 70–110)
POTASSIUM SERPL-SCNC: 3.1 MMOL/L (ref 3.5–5.1)
SODIUM SERPL-SCNC: 139 MMOL/L (ref 136–145)

## 2024-11-15 PROCEDURE — 99999 PR PBB SHADOW E&M-EST. PATIENT-LVL III: CPT | Mod: PBBFAC,,, | Performed by: INTERNAL MEDICINE

## 2024-11-15 PROCEDURE — 80048 BASIC METABOLIC PNL TOTAL CA: CPT | Performed by: INTERNAL MEDICINE

## 2024-11-15 PROCEDURE — 83880 ASSAY OF NATRIURETIC PEPTIDE: CPT | Performed by: INTERNAL MEDICINE

## 2024-11-15 PROCEDURE — 99213 OFFICE O/P EST LOW 20 MIN: CPT | Mod: PBBFAC | Performed by: INTERNAL MEDICINE

## 2024-11-15 PROCEDURE — 36415 COLL VENOUS BLD VENIPUNCTURE: CPT | Performed by: INTERNAL MEDICINE

## 2024-11-15 RX ORDER — LISINOPRIL 40 MG/1
40 TABLET ORAL DAILY
COMMUNITY
Start: 2024-09-07 | End: 2024-11-15 | Stop reason: ALTCHOICE

## 2024-11-15 RX ORDER — HYDRALAZINE HYDROCHLORIDE 25 MG/1
25 TABLET, FILM COATED ORAL 3 TIMES DAILY
COMMUNITY

## 2024-11-15 RX ORDER — BUMETANIDE 2 MG/1
2 TABLET ORAL DAILY
COMMUNITY
Start: 2024-10-17

## 2024-11-15 RX ORDER — ISOSORBIDE DINITRATE AND HYDRALAZINE HYDROCHLORIDE 37.5; 2 MG/1; MG/1
1 TABLET ORAL 3 TIMES DAILY
COMMUNITY
Start: 2024-10-13

## 2024-11-15 RX ORDER — BISOPROLOL FUMARATE 10 MG/1
10 TABLET, FILM COATED ORAL DAILY
COMMUNITY
Start: 2024-07-16 | End: 2024-11-15 | Stop reason: ALTCHOICE

## 2024-11-15 NOTE — PATIENT INSTRUCTIONS
Stop the bisoprolol and lisinopril.  Continue all your other medications including the metoprolol and entresto  Increase your Bumex to 3 mg daily  I would like to admit you to the hospital because of how much fluid you are holding on to, but your preference is to go to your local hospital so please go there immediately today after the completion of our visit for IV diuresis.  Please work on diet and weight loss because we need your BMI to be less than 40 (290) to consider LVAD or 35 (250 lbs) to consider transplant.

## 2024-11-15 NOTE — LETTER
November 15, 2024        Victorino Reyes  [address] [place] [address]  [place] [address]  Phone: [phone]  Fax: [phone]             [id number]  [address]  [facility] [address]  Phone: [phone]   Patient: Michael Conti   MR Number: [id number]   YOB: 2000   Date of Visit: 11/15/2024       Dear Dr. Victorino Reyes    Thank you for referring Michael Conti to me for evaluation. Attached you will find relevant portions of my assessment and plan of care.    If you have questions, please do not hesitate to call me. I look forward to following Michael Conti along with you.    Sincerely,    Adiel Triana MD    Enclosure    If you would like to receive this communication electronically, please contact [email or web address] or [phone] to request [employer] access.    [employer] is a tool which provides read-only access to select patient information with whom you have a relationship. Its easy to use and provides real time access to review your patients record including encounter summaries, notes, results, and demographic information.    If you feel you have received this communication in error or would no longer like to receive these types of communications, please e-mail [email or web address]

## 2024-11-15 NOTE — PROGRESS NOTES
Subjective   Patient ID:  Michael Conti is a 24 y.o. male who presents for follow-up of CHF    23 YO M w/ NICM, HFrEF, LVEF 10-15%, LVEDD 6.5 cm, HTN who presents for follow up.    He initially saw Dr. Banks in July for advanced therapies evaluation. Unfortunately given severe obesity (BMI > 50), deemed not a candidate for OHT or LVAD. Had testing including CPX and TTE done. RHC had to be cancelled due to hypertensive urgency with SBP of 190-200 pre procedure. Sees Dr. Gee García MD locally for his cardiac care. Comes in today for follow up.    Unfortunately since the last time we saw him has not been doing well.  Has been having worsening exertional dyspnea and weight gain over the past few weeks.  He says that at baseline he takes Bumex 2 mg a day, but this was not working so recently he has had to increase it to 3 mg a day on occasion.  He thinks that his dry weight is around 340 lb, but at present is III 56 lb.  With mild exertion he notes chest discomfort and shortness of breath.  Gets short of breath walking approximately 20-30 feet.  Notes some worsening leg swelling.  Has chronic PND/orthopnea and needs to sleep sitting upright in the chair.  No episodes of palpitations.    Does not have a defibrillator.  Says that he was previously given a LifeVest, but is not using it at present.    Of note, when I did his med reconciliation, he has both bisoprolol and metoprolol as well as lisinopril and Entresto listed on his meds.  When asked several times he does confirm that he is on all 4 of these medications.  When asked specifically he says that he has been having a chronic cough every morning causing him to vomit for the past few weeks.    TTE 9/13/24    Left Ventricle: The left ventricle is severely dilated. Severe global hypokinesis present. There is severely reduced systolic function with a visually estimated ejection fraction of 10 -15%. There is diastolic dysfunction.    Right Ventricle: Normal right  ventricular cavity size. Systolic function is mildly reduced.    Left Atrium: Left atrium is severely dilated.    Right Atrium: Right atrium is moderately dilated.    Mitral Valve: There is mild regurgitation.    Tricuspid Valve: There is mild regurgitation with the RVSP of 35mmHg.    Pulmonic Valve: There is mild regurgitation.    IVC/SVC: Elevated venous pressure at 15 mmHg.    CPX 9/24/24  Resting spirometry reveals an FVC = 1.96L which is 36% of predicted, an FEV1 of 1.68L, which is 36% of predicted and an FEV1/FVC ratio of 86%. The MVV = 67 L/min, which is 36% of predicted.     The respiratory exchange ratio (RER) was 0.71, suggesting poor effort.     The breathing reserve is calculated at 35%, which is normal. Oxygen saturation with exercise remained normal.     The peak VO2 was 9.0 ml/kg/min which is 21% of predicted equating to a functional capacity of 2.57 METS indicating severe functional impairment.     The anaerobic threshold was not attained.     The peak VO2 Lean was 12.83 ml/kg of lean body weight/min indicating a poor prognosis in heart failure.     The VE/VCO2 Dunn was 41.7. The Resting PetCO2 was 26.0.       Severe functional impairment associated with a normal breathing reserve, normal oxygen stauration, poor effort. These findings are indicative of functional impairment secondary to poor effort.         The ECG portion of this study is negative for myocardial ischemia.    The patient's exercise capacity was severely impaired.    There were no arrhythmias during stress.    The test was stopped because the patient experienced shortness of breath.    Severe functional impairment associated with a normal breathing reserve, normal oxygen stauration, poor effort. These findings are indicative of functional impairment secondary to poor effort.    There was no ST segment deviation noted during stress.    Review of Systems   Constitutional: Positive for decreased appetite and malaise/fatigue. Negative  for chills and fever.   HENT:  Negative for hearing loss.    Eyes:  Negative for visual disturbance.   Cardiovascular:  Positive for chest pain, dyspnea on exertion, leg swelling, orthopnea and paroxysmal nocturnal dyspnea. Negative for irregular heartbeat, palpitations and syncope.   Respiratory:  Positive for cough and shortness of breath.    Musculoskeletal:  Negative for muscle weakness.   Gastrointestinal:  Positive for bloating and anorexia. Negative for diarrhea, nausea and vomiting.   Neurological:  Negative for focal weakness.   Psychiatric/Behavioral:  Negative for memory loss.           Objective   Vitals:    11/15/24 1016   BP: (!) 135/97   Pulse: (!) 113       Physical Exam  Vitals reviewed.   Constitutional:       General: He is not in acute distress.     Appearance: He is obese.   HENT:      Head: Atraumatic.   Eyes:      Extraocular Movements: Extraocular movements intact.   Cardiovascular:      Rate and Rhythm: Regular rhythm. Tachycardia present.      Heart sounds:      Gallop present.      Comments: JVP to angle of jaw sitting upright  Pulmonary:      Breath sounds: Normal breath sounds.   Abdominal:      Palpations: Abdomen is soft.      Tenderness: There is no abdominal tenderness.   Musculoskeletal:         General: Normal range of motion.      Right lower leg: Edema present.      Left lower leg: Edema present.   Neurological:      General: No focal deficit present.      Mental Status: He is alert and oriented to person, place, and time.            Assessment and Plan     1. Acute on chronic combined systolic and diastolic heart failure    2. Non-ischemic cardiomyopathy    3. Primary hypertension        Plan:  - comes in today for follow-up.  Class 4 symptoms.  Significantly volume overloaded on exam.  Says that he has been failing oral diuretics as an outpatient.  - I recommended admission to the hospital for decongestion, but he says that he does not want to be admitted here as it is far from  his home, and all of his clothes and personal belongings are at home.  He says that he will leave clinic and go directly to his local hospital for admission.  I recommended he proceed to local emergency room for IV decongestion when able.  - unfortunately as noted previously is not a candidate for advanced heart failure therapies given his severe obesity with BMI more than 50.  I recommended that he target a BMI of at least below 40 for  consideration for LVAD and below 35 her consideration for transplant.  I also gave him to target weight he needs to achieve for his height to achieve this BMI. Will also place referral for bariatric medicine  - I recommended he increase his Bumex to 3 mg daily, but again stressed the importance of going to his local hospital for IV decongestion.  - with regards to his guideline directed medical therapy I recommended he continue metoprolol, Entresto, and Farxiga.  I recommended he stop his bisoprolol as well as lisinopril as he does not need to be on 2 beta-blockers, and he is likely experiencing cough related to being on lisinopril while also simultaneously on Entresto, and educated him on the risk of angioedema if he were to take these medications simultaneously.  Also recommend that he consider addition of MRA, and defibrillator if EF does not recover.  Continue follow-up with his general cardiologist, and can be referred back if his BMI or to get lower for consideration for advanced therapies as noted above.       Advance Care Planning     Date: 11/15/2024    Power of   I initiated the process of voluntary advance care planning today and explained the importance of this process to the patient.  I introduced the concept of advance directives to the patient, as well. Then the patient received detailed information about the importance of designating a Health Care Power of  (HCPOA). He was also instructed to communicate with this person about their wishes for future  healthcare, should he become sick and lose decision-making capacity. The patient has not previously appointed a HCPOA. After our discussion, the patient has decided to complete a HCPOA. I encouraged him to communicate with this person about their wishes for future healthcare, should he become sick and lose decision-making capacity.      A total of 15 min was spent on advance care planning, goals of care discussion, emotional support, formulating and communicating prognosis and exploring burden/benefit of various approaches of treatment. This discussion occurred on a fully voluntary basis with the verbal consent of the patient and/or family.

## 2024-11-16 ENCOUNTER — HOSPITAL ENCOUNTER (INPATIENT)
Facility: HOSPITAL | Age: 24
LOS: 4 days | Discharge: HOME | DRG: 291 | End: 2024-11-21
Attending: EMERGENCY MEDICINE | Admitting: HOSPITALIST
Payer: MEDICAID

## 2024-11-16 DIAGNOSIS — I50.20 HFREF (HEART FAILURE WITH REDUCED EJECTION FRACTION): ICD-10-CM

## 2024-11-16 DIAGNOSIS — R07.9 CHEST PAIN: ICD-10-CM

## 2024-11-16 LAB — NT-PROBNP SERPL IA-MCNC: 4561 PG/ML

## 2024-11-16 PROCEDURE — 83880 ASSAY OF NATRIURETIC PEPTIDE: CPT

## 2024-11-16 PROCEDURE — 85025 COMPLETE CBC W/AUTO DIFF WBC: CPT

## 2024-11-16 PROCEDURE — 93010 ELECTROCARDIOGRAM REPORT: CPT | Mod: 59,,, | Performed by: INTERNAL MEDICINE

## 2024-11-16 PROCEDURE — 87389 HIV-1 AG W/HIV-1&-2 AB AG IA: CPT | Performed by: PHYSICIAN ASSISTANT

## 2024-11-16 PROCEDURE — 80053 COMPREHEN METABOLIC PANEL: CPT

## 2024-11-16 PROCEDURE — 86803 HEPATITIS C AB TEST: CPT | Performed by: PHYSICIAN ASSISTANT

## 2024-11-16 PROCEDURE — 93010 ELECTROCARDIOGRAM REPORT: CPT | Mod: ,,, | Performed by: INTERNAL MEDICINE

## 2024-11-16 PROCEDURE — 93005 ELECTROCARDIOGRAM TRACING: CPT

## 2024-11-16 PROCEDURE — 84484 ASSAY OF TROPONIN QUANT: CPT

## 2024-11-16 PROCEDURE — 99285 EMERGENCY DEPT VISIT HI MDM: CPT | Mod: 25

## 2024-11-16 NOTE — Clinical Note
Diagnosis: Chest pain [405122]   Reason for IP Medical Treatment  (Clinical interventions that can only be accomplished in the IP setting? ) :: chf exacerbation

## 2024-11-17 LAB
ALBUMIN SERPL BCP-MCNC: 3.3 G/DL (ref 3.5–5.2)
ALBUMIN SERPL BCP-MCNC: 3.5 G/DL (ref 3.5–5.2)
ALLENS TEST: NORMAL
ALP SERPL-CCNC: 113 U/L (ref 40–150)
ALP SERPL-CCNC: 88 U/L (ref 40–150)
ALT SERPL W/O P-5'-P-CCNC: 19 U/L (ref 10–44)
ALT SERPL W/O P-5'-P-CCNC: 20 U/L (ref 10–44)
ANION GAP SERPL CALC-SCNC: 12 MMOL/L (ref 8–16)
ANION GAP SERPL CALC-SCNC: 13 MMOL/L (ref 8–16)
ANION GAP SERPL CALC-SCNC: 14 MMOL/L (ref 8–16)
AST SERPL-CCNC: 19 U/L (ref 10–40)
AST SERPL-CCNC: 23 U/L (ref 10–40)
BASOPHILS # BLD AUTO: 0.05 K/UL (ref 0–0.2)
BASOPHILS # BLD AUTO: 0.05 K/UL (ref 0–0.2)
BASOPHILS NFR BLD: 0.5 % (ref 0–1.9)
BASOPHILS NFR BLD: 0.5 % (ref 0–1.9)
BILIRUB SERPL-MCNC: 0.5 MG/DL (ref 0.1–1)
BILIRUB SERPL-MCNC: 0.5 MG/DL (ref 0.1–1)
BNP SERPL-MCNC: 2396 PG/ML (ref 0–99)
BUN SERPL-MCNC: 24 MG/DL (ref 6–20)
BUN SERPL-MCNC: 25 MG/DL (ref 6–20)
BUN SERPL-MCNC: 25 MG/DL (ref 6–20)
CALCIUM SERPL-MCNC: 9.2 MG/DL (ref 8.7–10.5)
CALCIUM SERPL-MCNC: 9.4 MG/DL (ref 8.7–10.5)
CALCIUM SERPL-MCNC: 9.4 MG/DL (ref 8.7–10.5)
CHLORIDE SERPL-SCNC: 103 MMOL/L (ref 95–110)
CHLORIDE SERPL-SCNC: 104 MMOL/L (ref 95–110)
CHLORIDE SERPL-SCNC: 105 MMOL/L (ref 95–110)
CO2 SERPL-SCNC: 23 MMOL/L (ref 23–29)
CO2 SERPL-SCNC: 26 MMOL/L (ref 23–29)
CO2 SERPL-SCNC: 26 MMOL/L (ref 23–29)
CREAT SERPL-MCNC: 1.3 MG/DL (ref 0.5–1.4)
CREAT SERPL-MCNC: 1.4 MG/DL (ref 0.5–1.4)
CREAT SERPL-MCNC: 1.6 MG/DL (ref 0.5–1.4)
DIFFERENTIAL METHOD BLD: ABNORMAL
DIFFERENTIAL METHOD BLD: ABNORMAL
EOSINOPHIL # BLD AUTO: 0.2 K/UL (ref 0–0.5)
EOSINOPHIL # BLD AUTO: 0.3 K/UL (ref 0–0.5)
EOSINOPHIL NFR BLD: 2.4 % (ref 0–8)
EOSINOPHIL NFR BLD: 2.4 % (ref 0–8)
ERYTHROCYTE [DISTWIDTH] IN BLOOD BY AUTOMATED COUNT: 14.5 % (ref 11.5–14.5)
ERYTHROCYTE [DISTWIDTH] IN BLOOD BY AUTOMATED COUNT: 14.6 % (ref 11.5–14.5)
EST. GFR  (NO RACE VARIABLE): >60 ML/MIN/1.73 M^2
ESTIMATED AVG GLUCOSE: 114 MG/DL (ref 68–131)
GLUCOSE SERPL-MCNC: 103 MG/DL (ref 70–110)
GLUCOSE SERPL-MCNC: 116 MG/DL (ref 70–110)
GLUCOSE SERPL-MCNC: 118 MG/DL (ref 70–110)
HBA1C MFR BLD: 5.6 % (ref 4–5.6)
HCT VFR BLD AUTO: 41.2 % (ref 40–54)
HCT VFR BLD AUTO: 42.7 % (ref 40–54)
HCV AB SERPL QL IA: NORMAL
HGB BLD-MCNC: 13.2 G/DL (ref 14–18)
HGB BLD-MCNC: 13.8 G/DL (ref 14–18)
HIV 1+2 AB+HIV1 P24 AG SERPL QL IA: NORMAL
IMM GRANULOCYTES # BLD AUTO: 0.04 K/UL (ref 0–0.04)
IMM GRANULOCYTES # BLD AUTO: 0.06 K/UL (ref 0–0.04)
IMM GRANULOCYTES NFR BLD AUTO: 0.4 % (ref 0–0.5)
IMM GRANULOCYTES NFR BLD AUTO: 0.6 % (ref 0–0.5)
LDH SERPL L TO P-CCNC: 1.33 MMOL/L (ref 0.5–2.2)
LYMPHOCYTES # BLD AUTO: 2 K/UL (ref 1–4.8)
LYMPHOCYTES # BLD AUTO: 2.3 K/UL (ref 1–4.8)
LYMPHOCYTES NFR BLD: 19 % (ref 18–48)
LYMPHOCYTES NFR BLD: 22.6 % (ref 18–48)
MAGNESIUM SERPL-MCNC: 1.8 MG/DL (ref 1.6–2.6)
MAGNESIUM SERPL-MCNC: 2.1 MG/DL (ref 1.6–2.6)
MCH RBC QN AUTO: 26.7 PG (ref 27–31)
MCH RBC QN AUTO: 27 PG (ref 27–31)
MCHC RBC AUTO-ENTMCNC: 32 G/DL (ref 32–36)
MCHC RBC AUTO-ENTMCNC: 32.3 G/DL (ref 32–36)
MCV RBC AUTO: 83 FL (ref 82–98)
MCV RBC AUTO: 84 FL (ref 82–98)
MONOCYTES # BLD AUTO: 0.6 K/UL (ref 0.3–1)
MONOCYTES # BLD AUTO: 0.7 K/UL (ref 0.3–1)
MONOCYTES NFR BLD: 6.2 % (ref 4–15)
MONOCYTES NFR BLD: 6.6 % (ref 4–15)
NEUTROPHILS # BLD AUTO: 6.8 K/UL (ref 1.8–7.7)
NEUTROPHILS # BLD AUTO: 7.4 K/UL (ref 1.8–7.7)
NEUTROPHILS NFR BLD: 67.7 % (ref 38–73)
NEUTROPHILS NFR BLD: 71.1 % (ref 38–73)
NRBC BLD-RTO: 0 /100 WBC
NRBC BLD-RTO: 0 /100 WBC
OHS QRS DURATION: 86 MS
OHS QRS DURATION: 86 MS
OHS QTC CALCULATION: 475 MS
OHS QTC CALCULATION: 476 MS
PHOSPHATE SERPL-MCNC: 2.8 MG/DL (ref 2.7–4.5)
PLATELET # BLD AUTO: 251 K/UL (ref 150–450)
PLATELET # BLD AUTO: 267 K/UL (ref 150–450)
PMV BLD AUTO: 10.3 FL (ref 9.2–12.9)
PMV BLD AUTO: 10.5 FL (ref 9.2–12.9)
POTASSIUM SERPL-SCNC: 3 MMOL/L (ref 3.5–5.1)
POTASSIUM SERPL-SCNC: 3 MMOL/L (ref 3.5–5.1)
POTASSIUM SERPL-SCNC: 3.3 MMOL/L (ref 3.5–5.1)
PROT SERPL-MCNC: 6.6 G/DL (ref 6–8.4)
PROT SERPL-MCNC: 7.1 G/DL (ref 6–8.4)
RBC # BLD AUTO: 4.94 M/UL (ref 4.6–6.2)
RBC # BLD AUTO: 5.11 M/UL (ref 4.6–6.2)
SAMPLE: NORMAL
SITE: NORMAL
SODIUM SERPL-SCNC: 140 MMOL/L (ref 136–145)
SODIUM SERPL-SCNC: 143 MMOL/L (ref 136–145)
SODIUM SERPL-SCNC: 143 MMOL/L (ref 136–145)
TROPONIN I SERPL DL<=0.01 NG/ML-MCNC: 0.17 NG/ML (ref 0–0.03)
TROPONIN I SERPL DL<=0.01 NG/ML-MCNC: 0.19 NG/ML (ref 0–0.03)
WBC # BLD AUTO: 10.03 K/UL (ref 3.9–12.7)
WBC # BLD AUTO: 10.34 K/UL (ref 3.9–12.7)

## 2024-11-17 PROCEDURE — 63600175 PHARM REV CODE 636 W HCPCS

## 2024-11-17 PROCEDURE — 20600001 HC STEP DOWN PRIVATE ROOM

## 2024-11-17 PROCEDURE — 25000003 PHARM REV CODE 250

## 2024-11-17 PROCEDURE — 84100 ASSAY OF PHOSPHORUS: CPT

## 2024-11-17 PROCEDURE — 83036 HEMOGLOBIN GLYCOSYLATED A1C: CPT

## 2024-11-17 PROCEDURE — 84484 ASSAY OF TROPONIN QUANT: CPT

## 2024-11-17 PROCEDURE — 94761 N-INVAS EAR/PLS OXIMETRY MLT: CPT | Mod: XB

## 2024-11-17 PROCEDURE — 80053 COMPREHEN METABOLIC PANEL: CPT

## 2024-11-17 PROCEDURE — 83605 ASSAY OF LACTIC ACID: CPT

## 2024-11-17 PROCEDURE — 36415 COLL VENOUS BLD VENIPUNCTURE: CPT

## 2024-11-17 PROCEDURE — 83735 ASSAY OF MAGNESIUM: CPT

## 2024-11-17 PROCEDURE — 85025 COMPLETE CBC W/AUTO DIFF WBC: CPT

## 2024-11-17 PROCEDURE — 25000003 PHARM REV CODE 250: Performed by: EMERGENCY MEDICINE

## 2024-11-17 PROCEDURE — 83735 ASSAY OF MAGNESIUM: CPT | Mod: 91

## 2024-11-17 PROCEDURE — 96374 THER/PROPH/DIAG INJ IV PUSH: CPT

## 2024-11-17 PROCEDURE — 80048 BASIC METABOLIC PNL TOTAL CA: CPT | Mod: XB

## 2024-11-17 PROCEDURE — 82803 BLOOD GASES ANY COMBINATION: CPT

## 2024-11-17 PROCEDURE — 99900035 HC TECH TIME PER 15 MIN (STAT)

## 2024-11-17 RX ORDER — POTASSIUM CHLORIDE 20 MEQ/1
40 TABLET, EXTENDED RELEASE ORAL ONCE
Status: COMPLETED | OUTPATIENT
Start: 2024-11-17 | End: 2024-11-17

## 2024-11-17 RX ORDER — AMLODIPINE BESYLATE 10 MG/1
10 TABLET ORAL DAILY
Status: DISCONTINUED | OUTPATIENT
Start: 2024-11-17 | End: 2024-11-18

## 2024-11-17 RX ORDER — DAPAGLIFLOZIN 10 MG/1
10 TABLET, FILM COATED ORAL DAILY
Status: DISCONTINUED | OUTPATIENT
Start: 2024-11-17 | End: 2024-11-21 | Stop reason: HOSPADM

## 2024-11-17 RX ORDER — FUROSEMIDE 10 MG/ML
100 INJECTION INTRAMUSCULAR; INTRAVENOUS 2 TIMES DAILY
Status: DISCONTINUED | OUTPATIENT
Start: 2024-11-17 | End: 2024-11-17

## 2024-11-17 RX ORDER — POTASSIUM CHLORIDE 20 MEQ/1
40 TABLET, EXTENDED RELEASE ORAL
Status: COMPLETED | OUTPATIENT
Start: 2024-11-17 | End: 2024-11-17

## 2024-11-17 RX ORDER — HYDRALAZINE HYDROCHLORIDE 25 MG/1
25 TABLET, FILM COATED ORAL 3 TIMES DAILY
Status: DISCONTINUED | OUTPATIENT
Start: 2024-11-17 | End: 2024-11-19

## 2024-11-17 RX ORDER — FUROSEMIDE 10 MG/ML
80 INJECTION INTRAMUSCULAR; INTRAVENOUS 2 TIMES DAILY
Status: DISCONTINUED | OUTPATIENT
Start: 2024-11-17 | End: 2024-11-19

## 2024-11-17 RX ORDER — HYDRALAZINE HYDROCHLORIDE 20 MG/ML
10 INJECTION INTRAMUSCULAR; INTRAVENOUS EVERY 6 HOURS PRN
Status: DISCONTINUED | OUTPATIENT
Start: 2024-11-17 | End: 2024-11-17

## 2024-11-17 RX ORDER — SODIUM CHLORIDE 0.9 % (FLUSH) 0.9 %
10 SYRINGE (ML) INJECTION
Status: DISCONTINUED | OUTPATIENT
Start: 2024-11-17 | End: 2024-11-21 | Stop reason: HOSPADM

## 2024-11-17 RX ORDER — FUROSEMIDE 10 MG/ML
80 INJECTION INTRAMUSCULAR; INTRAVENOUS EVERY 12 HOURS
Status: DISCONTINUED | OUTPATIENT
Start: 2024-11-17 | End: 2024-11-17

## 2024-11-17 RX ORDER — FUROSEMIDE 10 MG/ML
80 INJECTION INTRAMUSCULAR; INTRAVENOUS
Status: COMPLETED | OUTPATIENT
Start: 2024-11-17 | End: 2024-11-17

## 2024-11-17 RX ORDER — METOPROLOL SUCCINATE 100 MG/1
100 TABLET, EXTENDED RELEASE ORAL ONCE
Status: DISCONTINUED | OUTPATIENT
Start: 2024-11-17 | End: 2024-11-17

## 2024-11-17 RX ORDER — POTASSIUM CHLORIDE 7.45 MG/ML
10 INJECTION INTRAVENOUS
Status: DISCONTINUED | OUTPATIENT
Start: 2024-11-17 | End: 2024-11-17

## 2024-11-17 RX ORDER — METOPROLOL SUCCINATE 100 MG/1
100 TABLET, EXTENDED RELEASE ORAL ONCE
Status: COMPLETED | OUTPATIENT
Start: 2024-11-17 | End: 2024-11-17

## 2024-11-17 RX ORDER — BUMETANIDE 0.25 MG/ML
2 INJECTION, SOLUTION INTRAMUSCULAR; INTRAVENOUS
Status: DISCONTINUED | OUTPATIENT
Start: 2024-11-17 | End: 2024-11-17

## 2024-11-17 RX ORDER — METOPROLOL SUCCINATE 100 MG/1
100 TABLET, EXTENDED RELEASE ORAL 2 TIMES DAILY
Status: DISCONTINUED | OUTPATIENT
Start: 2024-11-17 | End: 2024-11-21 | Stop reason: HOSPADM

## 2024-11-17 RX ORDER — ENOXAPARIN SODIUM 100 MG/ML
40 INJECTION SUBCUTANEOUS EVERY 12 HOURS
Status: DISCONTINUED | OUTPATIENT
Start: 2024-11-17 | End: 2024-11-21 | Stop reason: HOSPADM

## 2024-11-17 RX ADMIN — ENOXAPARIN SODIUM 40 MG: 40 INJECTION SUBCUTANEOUS at 08:11

## 2024-11-17 RX ADMIN — SACUBITRIL AND VALSARTAN 1 TABLET: 24; 26 TABLET, FILM COATED ORAL at 08:11

## 2024-11-17 RX ADMIN — FUROSEMIDE 80 MG: 10 INJECTION, SOLUTION INTRAVENOUS at 08:11

## 2024-11-17 RX ADMIN — POTASSIUM CHLORIDE 40 MEQ: 1500 TABLET, EXTENDED RELEASE ORAL at 03:11

## 2024-11-17 RX ADMIN — HYDRALAZINE HYDROCHLORIDE 10 MG: 20 INJECTION INTRAMUSCULAR; INTRAVENOUS at 02:11

## 2024-11-17 RX ADMIN — HYDRALAZINE HYDROCHLORIDE 25 MG: 25 TABLET ORAL at 08:11

## 2024-11-17 RX ADMIN — POTASSIUM CHLORIDE 40 MEQ: 1500 TABLET, EXTENDED RELEASE ORAL at 12:11

## 2024-11-17 RX ADMIN — AMLODIPINE BESYLATE 10 MG: 10 TABLET ORAL at 01:11

## 2024-11-17 RX ADMIN — FUROSEMIDE 80 MG: 10 INJECTION, SOLUTION INTRAVENOUS at 05:11

## 2024-11-17 RX ADMIN — POTASSIUM CHLORIDE 10 MEQ: 7.46 INJECTION, SOLUTION INTRAVENOUS at 10:11

## 2024-11-17 RX ADMIN — SACUBITRIL AND VALSARTAN 1 TABLET: 24; 26 TABLET, FILM COATED ORAL at 09:11

## 2024-11-17 RX ADMIN — POTASSIUM BICARBONATE 40 MEQ: 391 TABLET, EFFERVESCENT ORAL at 11:11

## 2024-11-17 RX ADMIN — DAPAGLIFLOZIN 10 MG: 10 TABLET, FILM COATED ORAL at 08:11

## 2024-11-17 RX ADMIN — METOPROLOL SUCCINATE 100 MG: 100 TABLET, EXTENDED RELEASE ORAL at 04:11

## 2024-11-17 RX ADMIN — POTASSIUM BICARBONATE 40 MEQ: 391 TABLET, EFFERVESCENT ORAL at 01:11

## 2024-11-17 RX ADMIN — METOPROLOL SUCCINATE 100 MG: 100 TABLET, EXTENDED RELEASE ORAL at 08:11

## 2024-11-17 RX ADMIN — HYDRALAZINE HYDROCHLORIDE 25 MG: 25 TABLET ORAL at 03:11

## 2024-11-17 RX ADMIN — POTASSIUM CHLORIDE 40 MEQ: 1500 TABLET, EXTENDED RELEASE ORAL at 09:11

## 2024-11-17 RX ADMIN — FUROSEMIDE 80 MG: 10 INJECTION, SOLUTION INTRAVENOUS at 01:11

## 2024-11-17 NOTE — NURSING
Patient reeducated on output. Patient was not using urinal so output can be accurately measured. Nurse stressed importance and patient verbalized understanding. Patient output for this shift was not accurately reported. Will monitor.

## 2024-11-17 NOTE — ED NOTES
Michael Conti, an 24 y.o. male presents to the ED       Chief Complaint   Patient presents with    Chest Pain     States chest pain and sob started yesterday, hx of chf, and on transplant list.      Review of patient's allergies indicates:  No Known Allergies  Past Medical History:   Diagnosis Date    CHF (congestive heart failure)     Hypertension            LOC: The patient is awake, alert, aware of environment with an appropriate affect. Oriented x4, speaking appropriately  APPEARANCE: Pt resting comfortably, in no acute distress, pt is clean and well groomed, clothing properly fastened  SKIN:The skin is warm and dry, color consistent with ethnicity, patient has normal skin turgor and moist mucus membranes  RESPIRATORY:Airway is open and patent, respirations are spontaneous, + sob and cough  CARDIAC: Fast rate and rhythm,  peripheral edema noted, capillary refill < 3 seconds, bilateral radial pulses 2+. +chestpain  ABDOMEN: Soft, non tender, non distended.   NEUROLOGIC: PERRLA, facial expression is symmetrical, patient moving all extremities spontaneously, normal sensation in all extremities when touched with a finger.  Follows all commands appropriately  MUSCULOSKELETAL: Patient moving all extremities spontaneously, BLE swelling

## 2024-11-17 NOTE — ASSESSMENT & PLAN NOTE
Body mass index is 55.24 kg/m². Morbid obesity complicates all aspects of disease management from diagnostic modalities to treatment. Weight loss encouraged and health benefits explained to patient.

## 2024-11-17 NOTE — HOSPITAL COURSE
Patient admitted for SOB 2/2 CHF exacerbation. BNP of 2,396, prior was 4,561 on 11/15. Given furosemide 80mg IV. Patient fell overnight. Camera installed in room. Starting Bidil and attempting to get patient back on a Life Vest. Life vest received. F/u with cardiology.

## 2024-11-17 NOTE — NURSING
Patient c/o feeling dizzy when he got up out of chair. C/o cramping in his leg and tingling in his hands. MD notified. MD came and assessed patient at bedside. Vitals stable. Will monitor.

## 2024-11-17 NOTE — HPI
"Michael Conti is a 24 year old man with non-ischemic cardiomyopathy with EF 10-15%, HTN, and obesity BMI of 55, who presents on 11/16 with worsening shortness of breath and fluid retention for the last  Patient reports these symptoms have been getting worse over the last few weeks. He was seen in Heart Transplant clinic on 11/15 and reported this to his cardiologist and was instructed to present to the hospital for admission for decompensated CHF, however, patient wanted to gather his belongings from home first prior to presenting. Patient reports that his symptoms are consistent with past HF exacerbations and he and his aunt at bedside report that the patient has been hospitalized in [place], where they live, innumerable times in the recent past. Patient denies any other symptoms apart from his SOB and edema; denies fever, chills, rhinorrhea, congestion, n/v/d. He endorses a non productive cough, but feels like there is phlegm he is unable to expectorate. He reports to me that for the last 3-4 days, he has also stopped taking some of his heart and fluid medications (Entresto and Bumex) due to misunderstanding his cardiologist regarding his Bumex; however he also admits that he stopped because he had "given up". It appears he had been on several medications at the same time that may not have been optimal as well, per his cardiologist's note. Regarding diet, he reports he has been trying to adhere closely to fluid restriction and has been generally trying to eat healthy, including baked chicken with sodium substitutes like Mrs. Brizuela. He notes that his dry weight is 340 lbs, and he is currently at 357 lbs.    In the ED, patient afebrile and significantly hypertensive, up to 193/81. Labs notable for BNP 2400, troponin 0.166, K 3.0, and Cr 1.6. CXR with pulmonary vascular congestion. Patient given 1 dose of IV lasix 80 and admitted to Hospital Medicine for acute HF exacerbation.  "

## 2024-11-17 NOTE — SUBJECTIVE & OBJECTIVE
Past Medical History:   Diagnosis Date    CHF (congestive heart failure)     Hypertension        Past Surgical History:   Procedure Laterality Date    CARDIAC CATHETERIZATION         Review of patient's allergies indicates:  No Known Allergies    No current facility-administered medications on file prior to encounter.     Current Outpatient Medications on File Prior to Encounter   Medication Sig    bumetanide (BUMEX) 2 MG tablet Take 2 mg by mouth once daily.    amLODIPine (NORVASC) 10 MG tablet Take 10 mg by mouth once daily.    ENTRESTO 24-26 mg per tablet Take 1 tablet by mouth 2 (two) times daily.    FARXIGA 10 mg tablet Take 10 mg by mouth once daily.    hydrALAZINE (APRESOLINE) 25 MG tablet Take 25 mg by mouth 3 (three) times daily.    isosorbide-hydrALAZINE 20-37.5 mg (BIDIL) 20-37.5 mg Tab Take 1 tablet by mouth 3 (three) times daily.    metoprolol succinate (TOPROL-XL) 100 MG 24 hr tablet Take 1 tablet (100 mg total) by mouth 2 (two) times daily.    nitroGLYCERIN (NITROSTAT) 0.4 MG SL tablet Place 0.4 mg under the tongue every 5 (five) minutes as needed.    potassium chloride (KLOR-CON) 10 MEQ TbSR Take 10 mEq by mouth once daily.    traZODone (DESYREL) 50 MG tablet Take 50 mg by mouth nightly as needed.     Family History    None       Tobacco Use    Smoking status: Never     Passive exposure: Never    Smokeless tobacco: Never   Substance and Sexual Activity    Alcohol use: Yes    Drug use: Never    Sexual activity: Not on file     Review of Systems   Constitutional:  Positive for unexpected weight change. Negative for chills and fever.   HENT:  Negative for congestion and sore throat.    Eyes:  Negative for photophobia and visual disturbance.   Respiratory:  Positive for cough and shortness of breath.    Cardiovascular:  Positive for leg swelling. Negative for chest pain and palpitations.   Gastrointestinal:  Negative for abdominal pain.   Genitourinary:  Negative for dysuria and hematuria.    Musculoskeletal:  Negative for arthralgias and back pain.   Skin:  Negative for rash and wound.   Neurological:  Negative for dizziness and syncope.   Psychiatric/Behavioral:  Negative for agitation and behavioral problems.      Objective:     Vital Signs (Most Recent):  Temp: 98.1 °F (36.7 °C) (11/17/24 0100)  Pulse: 109 (11/17/24 0100)  Resp: 20 (11/17/24 0100)  BP: (!) 173/110 (11/17/24 0100)  SpO2: 100 % (11/17/24 0100) Vital Signs (24h Range):  Temp:  [97.5 °F (36.4 °C)-98.1 °F (36.7 °C)] 98.1 °F (36.7 °C)  Pulse:  [] 109  Resp:  [20-22] 20  SpO2:  [95 %-100 %] 100 %  BP: (173-193)/() 173/110     Weight: (!) 174.6 kg (385 lb)  Body mass index is 55.24 kg/m².     Physical Exam  Vitals reviewed.   Constitutional:       General: He is not in acute distress.     Appearance: Normal appearance. He is obese.   HENT:      Head: Normocephalic and atraumatic.      Nose: No congestion or rhinorrhea.      Mouth/Throat:      Mouth: Mucous membranes are moist.      Pharynx: Oropharynx is clear.   Cardiovascular:      Rate and Rhythm: Regular rhythm. Tachycardia present.      Pulses: Normal pulses.      Heart sounds: Normal heart sounds.   Pulmonary:      Effort: Pulmonary effort is normal. No respiratory distress.      Breath sounds: Rales present.   Abdominal:      General: Bowel sounds are normal.      Palpations: Abdomen is soft.      Tenderness: There is no abdominal tenderness.   Musculoskeletal:         General: Swelling present. No tenderness.      Cervical back: Full passive range of motion without pain and normal range of motion.      Right lower leg: Edema present.      Left lower leg: Edema present.   Skin:     General: Skin is warm and dry.   Neurological:      General: No focal deficit present.      Mental Status: He is alert and oriented to person, place, and time.   Psychiatric:         Mood and Affect: Mood normal.         Behavior: Behavior normal.                Significant Labs: All pertinent  labs within the past 24 hours have been reviewed.    Significant Imaging: I have reviewed all pertinent imaging results/findings within the past 24 hours.

## 2024-11-17 NOTE — PLAN OF CARE
Problem: Adult Inpatient Plan of Care  Goal: Plan of Care Review  Outcome: Progressing  Goal: Patient-Specific Goal (Individualized)  Outcome: Progressing  Goal: Absence of Hospital-Acquired Illness or Injury  Outcome: Progressing  Goal: Optimal Comfort and Wellbeing  Outcome: Progressing  Goal: Readiness for Transition of Care  Outcome: Progressing     Problem: Bariatric Environmental Safety  Goal: Safety Maintained with Care  Outcome: Progressing     Problem: Wound  Goal: Optimal Coping  Outcome: Progressing  Goal: Optimal Functional Ability  Outcome: Progressing

## 2024-11-17 NOTE — ED PROVIDER NOTES
Encounter Date: 11/16/2024       History     Chief Complaint   Patient presents with    Chest Pain     States chest pain and sob started yesterday, hx of chf, and on transplant list.      HPI    Patient is a 24-year-old male with a history of hypertension and CHF with EF approximately 15% presenting with progressively worsening shortness of breath chest pain.  Patient reports an outpatient yesterday and was told that he had a large amount of fluid and was instructed to come in the hospital.  Patient states he was admitted with the he left so returning now with continued symptoms.    He was unable to give a set timeline for his worsening shortness of breath, but does endorse that has been worsening over time.  Reports taking his Bumex, we will occasionally miss doses.    Endorsing and dry cough that he attributes to his CHF.  Denies fever, URI symptoms, nausea, vomiting, abdominal pain.    Review of patient's allergies indicates:  No Known Allergies  Past Medical History:   Diagnosis Date    CHF (congestive heart failure)     Hypertension      Past Surgical History:   Procedure Laterality Date    CARDIAC CATHETERIZATION       No family history on file.  Social History     Tobacco Use    Smoking status: Never     Passive exposure: Never    Smokeless tobacco: Never   Substance Use Topics    Alcohol use: Yes    Drug use: Never     Physical Exam     Initial Vitals   BP Pulse Resp Temp SpO2   11/16/24 2254 11/16/24 2231 11/16/24 2231 11/16/24 2231 11/16/24 2231   (!) 181/125 95 (!) 22 97.5 °F (36.4 °C) 96 %      MAP       --                Physical Exam    Constitutional: He appears well-developed and well-nourished.   Uncomfortable appearing   HENT:   Head: Normocephalic and atraumatic.   Cardiovascular:  Regular rhythm and intact distal pulses.           Tachycardic   Pulmonary/Chest: Breath sounds normal. No respiratory distress. He has no wheezes.   Tachypneic   Abdominal: Abdomen is soft. He exhibits no distension.  There is no abdominal tenderness.   Musculoskeletal:         General: Edema (BLE) present. No tenderness.     Neurological: He is alert.   Skin: Skin is warm and dry.   Psychiatric: Thought content normal.         ED Course   Procedures  Labs Reviewed   CBC W/ AUTO DIFFERENTIAL - Abnormal       Result Value    WBC 10.03      RBC 4.94      Hemoglobin 13.2 (*)     Hematocrit 41.2      MCV 83      MCH 26.7 (*)     MCHC 32.0      RDW 14.6 (*)     Platelets 251      MPV 10.5      Immature Granulocytes 0.6 (*)     Gran # (ANC) 6.8      Immature Grans (Abs) 0.06 (*)     Lymph # 2.3      Mono # 0.6      Eos # 0.2      Baso # 0.05      nRBC 0      Gran % 67.7      Lymph % 22.6      Mono % 6.2      Eosinophil % 2.4      Basophil % 0.5      Differential Method Automated      Narrative:     Release to patient->Immediate   COMPREHENSIVE METABOLIC PANEL - Abnormal    Sodium 140      Potassium 3.0 (*)     Chloride 104      CO2 23      Glucose 118 (*)     BUN 25 (*)     Creatinine 1.6 (*)     Calcium 9.2      Total Protein 6.6      Albumin 3.3 (*)     Total Bilirubin 0.5      Alkaline Phosphatase 88      AST 23      ALT 20      eGFR >60.0      Anion Gap 13      Narrative:     Release to patient->Immediate   TROPONIN I - Abnormal    Troponin I 0.166 (*)     Narrative:     Release to patient->Immediate   B-TYPE NATRIURETIC PEPTIDE - Abnormal    BNP 2,396 (*)     Narrative:     Release to patient->Immediate   HEPATITIS C ANTIBODY    Hepatitis C Ab Non-reactive      Narrative:     Release to patient->Immediate   HIV 1 / 2 ANTIBODY    HIV 1/2 Ag/Ab Non-reactive      Narrative:     Release to patient->Immediate   TROPONIN I   HEMOGLOBIN A1C   MAGNESIUM   COMPREHENSIVE METABOLIC PANEL   PHOSPHORUS   CBC W/ AUTO DIFFERENTIAL   ISTAT LACTATE    POC Lactate 1.33      Sample VENOUS      Site Other      Allens Test N/A       EKG Readings: (Independently Interpreted)   Initial Reading: No STEMI. Rhythm: Sinus Tachycardia. Heart Rate: 111.  Ectopy: No Ectopy. Conduction: Normal. T Waves Flipped: I and AVL. Axis: Left Axis Deviation. Clinical Impression: Sinus Tachycardia       Imaging Results              X-Ray Chest AP Portable (Final result)  Result time 11/17/24 03:33:42      Final result by Barron Gerardo MD (11/17/24 03:33:42)                   Impression:      Interstitial pulmonary edema and likely CHF.  Interstitial pneumonia or other pathology not fully excluded.  Correlate clinically and with continued radiographic follow-up.      Electronically signed by: Barron Gerardo  Date:    11/17/2024  Time:    03:33               Narrative:    EXAMINATION:  XR CHEST AP PORTABLE    CLINICAL HISTORY:  shortness of breath;    TECHNIQUE:  Single frontal view of the chest was performed.    COMPARISON:  Portable chest x-ray 09/12/2024    FINDINGS:  Midline thoracic trachea, allowing for leftward patient rotation.    Even allowing for the suboptimal inspiratory result, the cardiopericardial silhouette remains substantially enlarged.    There is increased engorgement of the central pulmonary vasculature, increased fine bilateral parahilar peribronchial reticulonodular pulmonary opacities.    No discrete consolidation, pneumothorax, or blunting of either lateral costophrenic angle is demonstrated radiographically.    Osseous and upper abdominal anatomic detail are suboptimally visualized.  External leads project over the torso.                                       Medications   sodium chloride 0.9% flush 10 mL (has no administration in time range)   enoxaparin injection 40 mg (has no administration in time range)   furosemide injection 80 mg (has no administration in time range)   amLODIPine tablet 10 mg (10 mg Oral Given 11/17/24 0149)   metoprolol succinate (TOPROL-XL) 24 hr tablet 100 mg (has no administration in time range)   dapagliflozin propanediol (Farxiga) tablet 10 mg (has no administration in time range)   hydrALAZINE tablet 25 mg (has no  administration in time range)   hydrALAZINE injection 10 mg (10 mg Intravenous Given 11/17/24 0226)   potassium bicarbonate disintegrating tablet 40 mEq (40 mEq Oral Given 11/17/24 0106)   furosemide injection 80 mg (80 mg Intravenous Given 11/17/24 0115)   potassium chloride SA CR tablet 40 mEq (40 mEq Oral Given 11/17/24 0338)   metoprolol succinate (TOPROL-XL) 24 hr tablet 100 mg (100 mg Oral Given 11/17/24 0454)     Medical Decision Making    Patient is a 24-year-old male with a history of hypertension and CHF with EF approximately 15% presenting with progressively worsening shortness of breath and chest pain.     Differential including CHF exacerbation, URI, cardiogenic shock, etc..    Patient's symptoms consistent with cardiac etiology.  Cardiac labs ordered including a point of care lactic acid.    CBC and CMP relatively unremarkable.  Troponin and BNP are both elevated, troponin seems to be relatively within baseline, however.  Patient was given 80 mg of Lasix IV to initiate diuresis and was admitted to Hospital Medicine for CHF exacerbation.           ED Course as of 11/17/24 0741   Sun Nov 17, 2024   0026 CBC auto differential(!) [DR]   0026 No leukocytosis, Hgb consistent with baseline [DR]      ED Course User Index  [] Nicolle Escobar DO                           Clinical Impression:  Final diagnoses:  [R07.9] Chest pain          ED Disposition Condition    Admit                 Nicolle Escobar DO  Resident  11/17/24 0741

## 2024-11-17 NOTE — PROGRESS NOTES
Pharmacist Renal Dose Adjustment Note    Michael Conti is a 24 y.o. male being treated with the medication enoxaparin    Patient Data:    Vital Signs (Most Recent):  Temp: 98.1 °F (36.7 °C) (11/17/24 0100)  Pulse: 109 (11/17/24 0100)  Resp: 20 (11/17/24 0100)  BP: (!) 173/110 (11/17/24 0100)  SpO2: 100 % (11/17/24 0100) Vital Signs (72h Range):  Temp:  [97.5 °F (36.4 °C)-98.1 °F (36.7 °C)]   Pulse:  []   Resp:  [20-22]   BP: (135-193)/()   SpO2:  [95 %-100 %]      Recent Labs   Lab 11/15/24  0940 11/16/24  2314   CREATININE 1.4 1.6*     Serum creatinine: 1.6 mg/dL (H) 11/16/24 2314  Estimated creatinine clearance: 114.4 mL/min (A)    Enoxaparin 40 mg SC every 24 hours will be changed to enoxaparin 40 mg SC every 12 hours due to patient's BMI per pharmacy protocol     Pharmacist's Name: Marilyn Villavicencio  Pharmacist's Extension: [phone]

## 2024-11-17 NOTE — H&P
"[facility] - Emergency Dept  [facility] Medicine  History & Physical    Patient Name: Michael Conti  MRN: [id number]  Patient Class: IP- Inpatient  Admission Date: 11/16/2024  Attending Physician: Tamara Hall MD   Primary Care Provider: Lele Verde NP (Inactive)         Patient information was obtained from patient, relative(s), and ER records.     Subjective:     Principal Problem:Acute on chronic combined systolic and diastolic heart failure    Chief Complaint:   Chief Complaint   Patient presents with    Chest Pain     States chest pain and sob started yesterday, hx of chf, and on transplant list.         HPI: Michael Conti is a 24 year old man with non-ischemic cardiomyopathy with EF 10-15%, HTN, and obesity BMI of 55, who presents on 11/16 with worsening shortness of breath and fluid retention for the last  Patient reports these symptoms have been getting worse over the last few weeks. He was seen in Heart Transplant clinic on 11/15 and reported this to his cardiologist and was instructed to present to the hospital for admission for decompensated CHF, however, patient wanted to gather his belongings from home first prior to presenting. Patient reports that his symptoms are consistent with past HF exacerbations and he and his aunt at bedside report that the patient has been hospitalized in [place], where they live, innumerable times in the recent past. Patient denies any other symptoms apart from his SOB and edema; denies fever, chills, rhinorrhea, congestion, n/v/d. He endorses a non productive cough, but feels like there is phlegm he is unable to expectorate. He reports to me that for the last 3-4 days, he has also stopped taking some of his heart and fluid medications (Entresto and Bumex) due to misunderstanding his cardiologist regarding his Bumex; however he also admits that he stopped because he had "given up". It appears he had been on several medications at the same time that may not have been " optimal as well, per his cardiologist's note. Regarding diet, he reports he has been trying to adhere closely to fluid restriction and has been generally trying to eat healthy, including baked chicken with sodium substitutes like Mrs. Brizuela. He notes that his dry weight is 340 lbs, and he is currently at 357 lbs.    In the ED, patient afebrile and significantly hypertensive, up to 193/81. Labs notable for BNP 2400, troponin 0.166, K 3.0, and Cr 1.6. CXR with pulmonary vascular congestion. Patient given 1 dose of IV lasix 80 and admitted to Hospital Medicine for acute HF exacerbation.    Past Medical History:   Diagnosis Date    CHF (congestive heart failure)     Hypertension        Past Surgical History:   Procedure Laterality Date    CARDIAC CATHETERIZATION         Review of patient's allergies indicates:  No Known Allergies    No current facility-administered medications on file prior to encounter.     Current Outpatient Medications on File Prior to Encounter   Medication Sig    bumetanide (BUMEX) 2 MG tablet Take 2 mg by mouth once daily.    amLODIPine (NORVASC) 10 MG tablet Take 10 mg by mouth once daily.    ENTRESTO 24-26 mg per tablet Take 1 tablet by mouth 2 (two) times daily.    FARXIGA 10 mg tablet Take 10 mg by mouth once daily.    hydrALAZINE (APRESOLINE) 25 MG tablet Take 25 mg by mouth 3 (three) times daily.    isosorbide-hydrALAZINE 20-37.5 mg (BIDIL) 20-37.5 mg Tab Take 1 tablet by mouth 3 (three) times daily.    metoprolol succinate (TOPROL-XL) 100 MG 24 hr tablet Take 1 tablet (100 mg total) by mouth 2 (two) times daily.    nitroGLYCERIN (NITROSTAT) 0.4 MG SL tablet Place 0.4 mg under the tongue every 5 (five) minutes as needed.    potassium chloride (KLOR-CON) 10 MEQ TbSR Take 10 mEq by mouth once daily.    traZODone (DESYREL) 50 MG tablet Take 50 mg by mouth nightly as needed.     Family History    None       Tobacco Use    Smoking status: Never     Passive exposure: Never    Smokeless tobacco:  Never   Substance and Sexual Activity    Alcohol use: Yes    Drug use: Never    Sexual activity: Not on file     Review of Systems   Constitutional:  Positive for unexpected weight change. Negative for chills and fever.   HENT:  Negative for congestion and sore throat.    Eyes:  Negative for photophobia and visual disturbance.   Respiratory:  Positive for cough and shortness of breath.    Cardiovascular:  Positive for leg swelling. Negative for chest pain and palpitations.   Gastrointestinal:  Negative for abdominal pain.   Genitourinary:  Negative for dysuria and hematuria.   Musculoskeletal:  Negative for arthralgias and back pain.   Skin:  Negative for rash and wound.   Neurological:  Negative for dizziness and syncope.   Psychiatric/Behavioral:  Negative for agitation and behavioral problems.      Objective:     Vital Signs (Most Recent):  Temp: 98.1 °F (36.7 °C) (11/17/24 0100)  Pulse: 109 (11/17/24 0100)  Resp: 20 (11/17/24 0100)  BP: (!) 173/110 (11/17/24 0100)  SpO2: 100 % (11/17/24 0100) Vital Signs (24h Range):  Temp:  [97.5 °F (36.4 °C)-98.1 °F (36.7 °C)] 98.1 °F (36.7 °C)  Pulse:  [] 109  Resp:  [20-22] 20  SpO2:  [95 %-100 %] 100 %  BP: (173-193)/() 173/110     Weight: (!) 174.6 kg (385 lb)  Body mass index is 55.24 kg/m².     Physical Exam  Vitals reviewed.   Constitutional:       General: He is not in acute distress.     Appearance: Normal appearance. He is obese.   HENT:      Head: Normocephalic and atraumatic.      Nose: No congestion or rhinorrhea.      Mouth/Throat:      Mouth: Mucous membranes are moist.      Pharynx: Oropharynx is clear.   Cardiovascular:      Rate and Rhythm: Regular rhythm. Tachycardia present.      Pulses: Normal pulses.      Heart sounds: Normal heart sounds.   Pulmonary:      Effort: Pulmonary effort is normal. No respiratory distress.      Breath sounds: Rales present.   Abdominal:      General: Bowel sounds are normal.      Palpations: Abdomen is soft.       Tenderness: There is no abdominal tenderness.   Musculoskeletal:         General: Swelling present. No tenderness.      Cervical back: Full passive range of motion without pain and normal range of motion.      Right lower leg: Edema present.      Left lower leg: Edema present.   Skin:     General: Skin is warm and dry.   Neurological:      General: No focal deficit present.      Mental Status: He is alert and oriented to person, place, and time.   Psychiatric:         Mood and Affect: Mood normal.         Behavior: Behavior normal.                Significant Labs: All pertinent labs within the past 24 hours have been reviewed.    Significant Imaging: I have reviewed all pertinent imaging results/findings within the past 24 hours.  Assessment/Plan:     * Acute on chronic combined systolic and diastolic heart failure  24M w/hx of NICM with EF 10-15% and morbid obesity who presents with acute HF exacerbation. Worsening symptoms and exacerbation likely in part due to some medication non-adherence, but also possibly due to the fact that his CHF medication regimen was not optimized with his cardiologist reporting that he was taking two beta blockers at the same time as well as lisinopril along with Entresto. On presentation, BNP 2400, troponin 0.166, and CXR with evidence of pulmonary congestion. Exam with peripheral edema and mild rales auscultated, consistent with acute CHF.    Plan:  - Continue diuresis with IV lasix 80mg BID   - If no effect, consider increasing dose or adding Diuril or metolazone   - Titrate to UOP, target 2-3L UOP and net 1-2L negative daily  - Resume GDMT with Toprol  BID, Farxiga 10mg qd and Entresto 24-26 BID  - Repeat Echo ordered  - Fluid restriction, 1.5L and low Na diet  - Strict I/Os, daily weights and volume assessments; dry weight reported to be 340 lbs  - Monitor electrolytes with daily CMP, maintain Mg > 2 and K > 4.    Primary hypertension  Patients blood pressure range in the  last 24 hours was: BP  Min: 173/110  Max: 193/81.The patient's inpatient anti-hypertensive regimen is listed below:  Current Antihypertensives  furosemide injection 80 mg, Every 12 hours, Intravenous  amLODIPine tablet 10 mg, Daily, Oral  metoprolol succinate (TOPROL-XL) 24 hr tablet 100 mg, 2 times daily, Oral  hydrALAZINE tablet 25 mg, 3 times daily, Oral  hydrALAZINE injection 10 mg, Every 6 hours PRN, Intravenous    Plan  - BP is uncontrolled, will adjust as follows: PRN IV hydralazine ordered given BP persistently > 190 in the ED  - Adjust regimen as needed to optimize BP control    Non-ischemic cardiomyopathy  Diagnosed in 2020. Aunt reports that patient has been in the hospital in [place] many times, and that there was a period he was hospitalized almost every other week for HF exacerbation. Unknown etiology. Established with [facility] cardiology in July 2024 and was deemed not to be a transplant candidate due to BMI > 35.    - See acute on chronic HF      Morbid obesity  Body mass index is 55.24 kg/m². Morbid obesity complicates all aspects of disease management from diagnostic modalities to treatment. Weight loss encouraged and health benefits explained to patient.           VTE Risk Mitigation (From admission, onward)           Ordered     enoxaparin injection 40 mg  Every 12 hours         11/17/24 0132     IP VTE HIGH RISK PATIENT  Once         11/17/24 0132     Place sequential compression device  Until discontinued         11/17/24 0132                           De Núñez MD  Department of Hospital Medicine  [facility] - Emergency Dept

## 2024-11-17 NOTE — CONSULTS
Food & Nutrition  Education    Diet Education: Low Na  Time Spent: 10 minutes  Learners: Patient    Nutrition Education provided with handouts. All questions and concerns answered. Dietitian's contact information provided.     Pt tolerating diet w/ good appetite. Appears nourished w/ UBW of 350# - no indicators of malnutrition noted.    Follow-Up: Yes    Please re-consult as needed.    Thanks!  MS Riddhi, RD, LDN

## 2024-11-17 NOTE — SUBJECTIVE & OBJECTIVE
Interval History: Patient experienced a fall last night after getting up from his chair and complaining of dizziness. Patient believes it occurred after he had been given his night medications. Explained to him that his BP meds could cause dizziness, and he should attempt to move his legs around and slowly get up in the future. Denies any chest pain or pressure, but does still complain of SOB and swelling.    Review of Systems   Constitutional: Negative.  Negative for chills, diaphoresis, fatigue and fever.   HENT: Negative.  Negative for congestion, ear pain, hearing loss, rhinorrhea and sore throat.    Eyes: Negative.    Respiratory:  Positive for shortness of breath. Negative for cough and chest tightness.    Cardiovascular: Negative.  Negative for chest pain and palpitations.   Gastrointestinal: Negative.  Negative for abdominal distention, abdominal pain, blood in stool, constipation, diarrhea, nausea and vomiting.   Genitourinary: Negative.  Negative for dysuria, frequency and hematuria.   Musculoskeletal: Negative.  Negative for back pain, joint swelling and neck pain.   Skin: Negative.    Neurological:  Positive for dizziness and light-headedness. Negative for seizures, syncope, weakness and headaches.   Psychiatric/Behavioral: Negative.       Objective:     Vital Signs (Most Recent):  Temp: 98 °F (36.7 °C) (11/17/24 0800)  Pulse: 102 (11/17/24 0800)  Resp: 20 (11/17/24 0800)  BP: (!) 157/106 (11/17/24 0800)  SpO2: 97 % (11/17/24 0800) Vital Signs (24h Range):  Temp:  [97.5 °F (36.4 °C)-98.3 °F (36.8 °C)] 98 °F (36.7 °C)  Pulse:  [] 102  Resp:  [15-22] 20  SpO2:  [95 %-100 %] 97 %  BP: (157-193)/() 157/106     Weight: (!) 159.9 kg (352 lb 8.3 oz)  Body mass index is 50.58 kg/m².    Intake/Output Summary (Last 24 hours) at 11/17/2024 0921  Last data filed at 11/17/2024 0500  Gross per 24 hour   Intake --   Output 2400 ml   Net -2400 ml         Physical Exam  Constitutional:       General: He is  not in acute distress.     Appearance: Normal appearance. He is not ill-appearing.   HENT:      Head: Normocephalic.      Nose: Nose normal.   Eyes:      Extraocular Movements: Extraocular movements intact.   Cardiovascular:      Rate and Rhythm: Normal rate and regular rhythm.      Pulses: Normal pulses.      Heart sounds: Normal heart sounds. No murmur heard.     No friction rub. No gallop.   Pulmonary:      Effort: Pulmonary effort is normal. No respiratory distress.      Breath sounds: Normal breath sounds. No wheezing or rales.   Abdominal:      General: Bowel sounds are normal. There is no distension.      Palpations: Abdomen is soft.      Tenderness: There is no abdominal tenderness. There is no guarding.   Musculoskeletal:         General: Swelling present.      Cervical back: Normal range of motion. No rigidity or tenderness.      Right lower leg: Edema present.      Left lower leg: Edema present.   Skin:     General: Skin is warm.   Neurological:      General: No focal deficit present.      Mental Status: He is alert and oriented to person, place, and time.   Psychiatric:         Mood and Affect: Mood normal.         Behavior: Behavior normal.             Significant Labs: All pertinent labs within the past 24 hours have been reviewed.  CBC:   Recent Labs   Lab 11/16/24  2314 11/17/24  0623 11/18/24  0447   WBC 10.03 10.34 11.65   HGB 13.2* 13.8* 15.1   HCT 41.2 42.7 46.0    267 285     CMP:   Recent Labs   Lab 11/16/24  2314 11/17/24  0623 11/17/24  1932 11/18/24  0447    143 143 141   K 3.0* 3.0* 3.3* 3.6    103 105 105   CO2 23 26 26 24   * 103 116* 81   BUN 25* 24* 25* 23*   CREATININE 1.6* 1.4 1.3 1.4   CALCIUM 9.2 9.4 9.4 9.6   PROT 6.6 7.1  --  7.3   ALBUMIN 3.3* 3.5  --  3.5   BILITOT 0.5 0.5  --  0.7   ALKPHOS 88 113  --  93   AST 23 19  --  20   ALT 20 19  --  19   ANIONGAP 13 14 12 12       Significant Imaging: I have reviewed all pertinent imaging results/findings  within the past 24 hours.

## 2024-11-17 NOTE — ASSESSMENT & PLAN NOTE
Diagnosed in 2020. Aunt reports that patient has been in the hospital in [place] many times, and that there was a period he was hospitalized almost every other week for HF exacerbation. Unknown etiology. Established with [facility] cardiology in July 2024 and was deemed not to be a transplant candidate due to BMI > 35.    - See acute on chronic HF

## 2024-11-17 NOTE — ASSESSMENT & PLAN NOTE
Patients blood pressure range in the last 24 hours was: BP  Min: 173/110  Max: 193/81.The patient's inpatient anti-hypertensive regimen is listed below:  Current Antihypertensives  furosemide injection 80 mg, Every 12 hours, Intravenous  amLODIPine tablet 10 mg, Daily, Oral  metoprolol succinate (TOPROL-XL) 24 hr tablet 100 mg, 2 times daily, Oral  hydrALAZINE tablet 25 mg, 3 times daily, Oral  hydrALAZINE injection 10 mg, Every 6 hours PRN, Intravenous    Plan  - BP is uncontrolled, will adjust as follows: PRN IV hydralazine ordered given BP persistently > 190 in the ED  - Adjust regimen as needed to optimize BP control

## 2024-11-17 NOTE — EKG INTERPRETATIONS - EMERGENCY DEPT.
Independently interpreted by MD:  Rate 111, NSR, no stemi, no ectopy, sinus tachycardia, LVH, MO, nonspecific T wave changes

## 2024-11-17 NOTE — ASSESSMENT & PLAN NOTE
24M w/hx of NICM with EF 10-15% and morbid obesity who presents with acute HF exacerbation. Worsening symptoms and exacerbation likely in part due to some medication non-adherence, but also possibly due to the fact that his CHF medication regimen was not optimized with his cardiologist reporting that he was taking two beta blockers at the same time as well as lisinopril along with Entresto. On presentation, BNP 2400, troponin 0.166, and CXR with evidence of pulmonary congestion. Exam with peripheral edema and mild rales auscultated, consistent with acute CHF.    Plan:  - Continue diuresis with IV lasix 80mg BID   - If no effect, consider increasing dose or adding Diuril or metolazone   - Titrate to UOP, target 2-3L UOP and net 1-2L negative daily  - Resume GDMT with Toprol  BID, Farxiga 10mg qd and Entresto 24-26 BID  - Repeat Echo ordered  - Fluid restriction, 1.5L and low Na diet  - Strict I/Os, daily weights and volume assessments; dry weight reported to be 340 lbs  - Monitor electrolytes with daily CMP, maintain Mg > 2 and K > 4.

## 2024-11-18 ENCOUNTER — DOCUMENTATION ONLY (OUTPATIENT)
Dept: CARDIOLOGY | Facility: CLINIC | Age: 24
End: 2024-11-18
Payer: MEDICAID

## 2024-11-18 LAB
ALBUMIN SERPL BCP-MCNC: 3.5 G/DL (ref 3.5–5.2)
ALP SERPL-CCNC: 93 U/L (ref 40–150)
ALT SERPL W/O P-5'-P-CCNC: 19 U/L (ref 10–44)
ANION GAP SERPL CALC-SCNC: 12 MMOL/L (ref 8–16)
ASCENDING AORTA: 3.32 CM
AST SERPL-CCNC: 20 U/L (ref 10–40)
AV AREA BY CONTINUOUS VTI: 3.6 CM2
AV INDEX (PROSTH): 0.75
AV LVOT MEAN GRADIENT: 1 MMHG
AV LVOT PEAK GRADIENT: 1 MMHG
AV MEAN GRADIENT: 1.5 MMHG
AV PEAK GRADIENT: 2.6 MMHG
AV VALVE AREA BY VELOCITY RATIO: 3.7 CM²
AV VALVE AREA: 3.7 CM2
AV VELOCITY RATIO: 0.75
BASOPHILS # BLD AUTO: 0.04 K/UL (ref 0–0.2)
BASOPHILS NFR BLD: 0.3 % (ref 0–1.9)
BILIRUB SERPL-MCNC: 0.7 MG/DL (ref 0.1–1)
BSA FOR ECHO PROCEDURE: 2.77 M2
BUN SERPL-MCNC: 23 MG/DL (ref 6–20)
CALCIUM SERPL-MCNC: 9.6 MG/DL (ref 8.7–10.5)
CHLORIDE SERPL-SCNC: 105 MMOL/L (ref 95–110)
CO2 SERPL-SCNC: 24 MMOL/L (ref 23–29)
CREAT SERPL-MCNC: 1.4 MG/DL (ref 0.5–1.4)
CV ECHO LV RWT: 0.44 CM
DIFFERENTIAL METHOD BLD: ABNORMAL
DOP CALC AO PEAK VEL: 0.8 M/S
DOP CALC AO VTI: 12.1 CM
DOP CALC LVOT AREA: 4.9 CM2
DOP CALC LVOT DIAMETER: 2.5 CM
DOP CALC LVOT PEAK VEL: 0.6 M/S
DOP CALC LVOT STROKE VOLUME: 44.6 CM3
DOP CALCLVOT PEAK VEL VTI: 9.1 CM
E WAVE DECELERATION TIME: 75.83 MS
E/A RATIO: 2.13
E/E' RATIO: 12.36 M/S
ECHO EF ESTIMATED: 15 %
ECHO LV POSTERIOR WALL: 1.6 CM (ref 0.6–1.1)
EOSINOPHIL # BLD AUTO: 0.2 K/UL (ref 0–0.5)
EOSINOPHIL NFR BLD: 1.3 % (ref 0–8)
ERYTHROCYTE [DISTWIDTH] IN BLOOD BY AUTOMATED COUNT: 14.9 % (ref 11.5–14.5)
EST. GFR  (NO RACE VARIABLE): >60 ML/MIN/1.73 M^2
FRACTIONAL SHORTENING: 6.8 % (ref 28–44)
GLUCOSE SERPL-MCNC: 81 MG/DL (ref 70–110)
HCT VFR BLD AUTO: 46 % (ref 40–54)
HGB BLD-MCNC: 15.1 G/DL (ref 14–18)
IMM GRANULOCYTES # BLD AUTO: 0.03 K/UL (ref 0–0.04)
IMM GRANULOCYTES NFR BLD AUTO: 0.3 % (ref 0–0.5)
INTERVENTRICULAR SEPTUM: 0.8 CM (ref 0.6–1.1)
IVC DIAMETER: 2.74 CM
IVRT: 74.22 MS
LA MAJOR: 6.29 CM
LA MINOR: 6.11 CM
LA WIDTH: 5.17 CM
LEFT ATRIUM SIZE: 5 CM
LEFT ATRIUM VOLUME INDEX MOD: 57.6 ML/M2
LEFT ATRIUM VOLUME INDEX: 51.8 ML/M2
LEFT ATRIUM VOLUME MOD: 151.41 ML
LEFT ATRIUM VOLUME: 136.2 CM3
LEFT INTERNAL DIMENSION IN SYSTOLE: 6.8 CM (ref 2.1–4)
LEFT VENTRICLE DIASTOLIC VOLUME INDEX: 107.17 ML/M2
LEFT VENTRICLE DIASTOLIC VOLUME: 281.85 ML
LEFT VENTRICLE MASS INDEX: 165.9 G/M2
LEFT VENTRICLE SYSTOLIC VOLUME INDEX: 90.7 ML/M2
LEFT VENTRICLE SYSTOLIC VOLUME: 238.49 ML
LEFT VENTRICULAR INTERNAL DIMENSION IN DIASTOLE: 7.3 CM (ref 3.5–6)
LEFT VENTRICULAR MASS: 436.3 G
LV LATERAL E/E' RATIO: 9.71
LV SEPTAL E/E' RATIO: 17
LYMPHOCYTES # BLD AUTO: 2 K/UL (ref 1–4.8)
LYMPHOCYTES NFR BLD: 17.3 % (ref 18–48)
MAGNESIUM SERPL-MCNC: 2 MG/DL (ref 1.6–2.6)
MCH RBC QN AUTO: 27.2 PG (ref 27–31)
MCHC RBC AUTO-ENTMCNC: 32.8 G/DL (ref 32–36)
MCV RBC AUTO: 83 FL (ref 82–98)
MONOCYTES # BLD AUTO: 0.9 K/UL (ref 0.3–1)
MONOCYTES NFR BLD: 8 % (ref 4–15)
MV A" WAVE DURATION": 142.72 MS
MV PEAK A VEL: 0.32 M/S
MV PEAK E VEL: 0.68 M/S
NEUTROPHILS # BLD AUTO: 8.5 K/UL (ref 1.8–7.7)
NEUTROPHILS NFR BLD: 72.8 % (ref 38–73)
NRBC BLD-RTO: 0 /100 WBC
OHS CV RV/LV RATIO: 0.64 CM
PHOSPHATE SERPL-MCNC: 3.8 MG/DL (ref 2.7–4.5)
PISA TR MAX VEL: 3.02 M/S
PLATELET # BLD AUTO: 285 K/UL (ref 150–450)
PMV BLD AUTO: 10.3 FL (ref 9.2–12.9)
POTASSIUM SERPL-SCNC: 3.6 MMOL/L (ref 3.5–5.1)
PROT SERPL-MCNC: 7.3 G/DL (ref 6–8.4)
PULM VEIN A" WAVE DURATION": 142.72 MS
PULM VEIN S/D RATIO: 0.74
PULMONIC VEIN PEAK A VELOCITY: 0.1 M/S
PV PEAK D VEL: 0.23 M/S
PV PEAK S VEL: 0.17 M/S
RA MAJOR: 5.59 CM
RA PRESSURE ESTIMATED: 15 MMHG
RA WIDTH: 5.24 CM
RBC # BLD AUTO: 5.56 M/UL (ref 4.6–6.2)
RIGHT ATRIAL AREA: 24.7 CM2
RIGHT VENTRICLE DIASTOLIC BASEL DIMENSION: 4.7 CM
RV TB RVSP: 18 MMHG
RV TISSUE DOPPLER FREE WALL SYSTOLIC VELOCITY 1 (APICAL 4 CHAMBER VIEW): 9.54 CM/S
SINUS: 3.68 CM
SODIUM SERPL-SCNC: 141 MMOL/L (ref 136–145)
STJ: 3.08 CM
TASV: 9 CM/S
TDI LATERAL: 0.07 M/S
TDI SEPTAL: 0.04 M/S
TDI: 0.06 M/S
TR MAX PG: 36 MMHG
TRICUSPID ANNULAR PLANE SYSTOLIC EXCURSION: 1.3 CM
TV PEAK GRADIENT: 36 MMHG
TV REST PULMONARY ARTERY PRESSURE: 51 MMHG
WBC # BLD AUTO: 11.65 K/UL (ref 3.9–12.7)
Z-SCORE OF LEFT VENTRICULAR DIMENSION IN END DIASTOLE: -10.21
Z-SCORE OF LEFT VENTRICULAR DIMENSION IN END SYSTOLE: -4.39

## 2024-11-18 PROCEDURE — 36415 COLL VENOUS BLD VENIPUNCTURE: CPT

## 2024-11-18 PROCEDURE — 63600175 PHARM REV CODE 636 W HCPCS

## 2024-11-18 PROCEDURE — 83735 ASSAY OF MAGNESIUM: CPT

## 2024-11-18 PROCEDURE — 80053 COMPREHEN METABOLIC PANEL: CPT

## 2024-11-18 PROCEDURE — 25000003 PHARM REV CODE 250

## 2024-11-18 PROCEDURE — 25500020 PHARM REV CODE 255: Performed by: INTERNAL MEDICINE

## 2024-11-18 PROCEDURE — 84100 ASSAY OF PHOSPHORUS: CPT

## 2024-11-18 PROCEDURE — 85025 COMPLETE CBC W/AUTO DIFF WBC: CPT

## 2024-11-18 PROCEDURE — 25000003 PHARM REV CODE 250: Performed by: HOSPITALIST

## 2024-11-18 PROCEDURE — 20600001 HC STEP DOWN PRIVATE ROOM

## 2024-11-18 RX ORDER — ACETYLCYSTEINE 100 MG/ML
4 SOLUTION ORAL; RESPIRATORY (INHALATION)
Status: DISCONTINUED | OUTPATIENT
Start: 2024-11-19 | End: 2024-11-18

## 2024-11-18 RX ORDER — GUAIFENESIN 600 MG/1
600 TABLET, EXTENDED RELEASE ORAL 2 TIMES DAILY
Status: DISCONTINUED | OUTPATIENT
Start: 2024-11-19 | End: 2024-11-18

## 2024-11-18 RX ORDER — POTASSIUM CHLORIDE 20 MEQ/1
40 TABLET, EXTENDED RELEASE ORAL 2 TIMES DAILY
Status: COMPLETED | OUTPATIENT
Start: 2024-11-18 | End: 2024-11-18

## 2024-11-18 RX ORDER — ACETYLCYSTEINE 100 MG/ML
4 SOLUTION ORAL; RESPIRATORY (INHALATION) 3 TIMES DAILY PRN
Status: DISCONTINUED | OUTPATIENT
Start: 2024-11-19 | End: 2024-11-19 | Stop reason: ALTCHOICE

## 2024-11-18 RX ORDER — GUAIFENESIN 600 MG/1
600 TABLET, EXTENDED RELEASE ORAL 2 TIMES DAILY PRN
Status: DISCONTINUED | OUTPATIENT
Start: 2024-11-19 | End: 2024-11-21

## 2024-11-18 RX ADMIN — METOPROLOL SUCCINATE 100 MG: 100 TABLET, EXTENDED RELEASE ORAL at 08:11

## 2024-11-18 RX ADMIN — HYDRALAZINE HYDROCHLORIDE 25 MG: 25 TABLET ORAL at 03:11

## 2024-11-18 RX ADMIN — FUROSEMIDE 80 MG: 10 INJECTION, SOLUTION INTRAVENOUS at 06:11

## 2024-11-18 RX ADMIN — POTASSIUM CHLORIDE 40 MEQ: 1500 TABLET, EXTENDED RELEASE ORAL at 09:11

## 2024-11-18 RX ADMIN — METOPROLOL SUCCINATE 100 MG: 100 TABLET, EXTENDED RELEASE ORAL at 09:11

## 2024-11-18 RX ADMIN — DAPAGLIFLOZIN 10 MG: 10 TABLET, FILM COATED ORAL at 09:11

## 2024-11-18 RX ADMIN — ENOXAPARIN SODIUM 40 MG: 40 INJECTION SUBCUTANEOUS at 08:11

## 2024-11-18 RX ADMIN — AMLODIPINE BESYLATE 10 MG: 10 TABLET ORAL at 09:11

## 2024-11-18 RX ADMIN — SACUBITRIL AND VALSARTAN 1 TABLET: 49; 51 TABLET, FILM COATED ORAL at 08:11

## 2024-11-18 RX ADMIN — POTASSIUM CHLORIDE 40 MEQ: 1500 TABLET, EXTENDED RELEASE ORAL at 08:11

## 2024-11-18 RX ADMIN — HYDRALAZINE HYDROCHLORIDE 25 MG: 25 TABLET ORAL at 08:11

## 2024-11-18 RX ADMIN — GUAIFENESIN 600 MG: 600 TABLET, EXTENDED RELEASE ORAL at 11:11

## 2024-11-18 RX ADMIN — HYDRALAZINE HYDROCHLORIDE 25 MG: 25 TABLET ORAL at 09:11

## 2024-11-18 RX ADMIN — SACUBITRIL AND VALSARTAN 1 TABLET: 24; 26 TABLET, FILM COATED ORAL at 09:11

## 2024-11-18 RX ADMIN — ENOXAPARIN SODIUM 40 MG: 40 INJECTION SUBCUTANEOUS at 09:11

## 2024-11-18 RX ADMIN — PERFLUTREN 0.5 ML: 6.52 INJECTION, SUSPENSION INTRAVENOUS at 05:11

## 2024-11-18 RX ADMIN — FUROSEMIDE 80 MG: 10 INJECTION, SOLUTION INTRAVENOUS at 03:11

## 2024-11-18 NOTE — ASSESSMENT & PLAN NOTE
24M w/hx of NICM with EF 10-15% and morbid obesity who presents with acute HF exacerbation. Worsening symptoms and exacerbation likely in part due to some medication non-adherence, but also possibly due to the fact that his CHF medication regimen was not optimized with his cardiologist reporting that he was taking two beta blockers at the same time as well as lisinopril along with Entresto. On presentation, BNP 2400, troponin 0.166, and CXR with evidence of pulmonary congestion. Exam with peripheral edema and mild rales auscultated, consistent with acute CHF.    Plan:  - Continue diuresis with IV lasix 80mg BID   - If no effect, consider increasing dose or adding Diuril or metolazone   - Titrate to UOP, target 2-3L UOP and net 1-2L negative daily  - Resume GDMT with Toprol  BID, Farxiga 10mg qd and increasing Entresto to 49-51 BID  - D/C'ed amlodipine  - Repeat Echo ordered  - Fluid restriction,1.5L and low Na diet  - Strict I/Os, daily weights and volume assessments; dry weight reported to be 340 lbs  - Monitor electrolytes with daily CMP, maintain Mg > 2 and K > 4.  - f/u with cardiology outpatient

## 2024-11-18 NOTE — PLAN OF CARE
[facility] - Cardiology Stepdown  Initial Discharge Assessment       Primary Care Provider: Lele Verde NP (Inactive)    Admission Diagnosis: Chest pain [R07.9]  HFrEF (heart failure with reduced ejection fraction) [I50.20]    Admission Date: 11/16/2024  Expected Discharge Date: 11/20/2024    Transition of Care Barriers: (P) None    Payor: MEDICAID / Plan: [facility] COMMUNITY PLAN [facility] ([place] MEDICAID) / Product Type: Managed Medicaid /     Extended Emergency Contact Information  Primary Emergency Contact: Sherlyn Conti  Mobile Phone: [phone]  Relation: Grandparent  Preferred language: English   needed? No    Discharge Plan A: (P) Home with family  Discharge Plan B: (P) Home      [facility] - [place] [facility] - [address]  [address]  [place] [address]  Phone: [phone] Fax: [phone]      Initial Assessment (most recent)       Adult Discharge Assessment - 11/17/24 1900          Discharge Assessment    Assessment Type Discharge Planning Assessment (P)      Confirmed/corrected address, phone number and insurance Yes (P)      Confirmed Demographics Correct on Facesheet (P)      Source of Information patient (P)      When was your last doctors appointment? -- (P)    2 weeks ago.    Communicated YU with patient/caregiver Date not available/Unable to determine (P)      Reason For Admission Heart Failure (P)      People in Home grandparent(s);other relative(s) (P)      Do you expect to return to your current living situation? Yes (P)      Do you have help at home or someone to help you manage your care at home? Yes (P)      Who are your caregiver(s) and their phone number(s)? Grandmother, Sherlyn Conti & Aunt, Colette Conti (P)      Prior to hospitilization cognitive status: Alert/Oriented (P)      Current cognitive status: Alert/Oriented (P)      Walking or Climbing Stairs Difficulty no (P)      Dressing/Bathing Difficulty no (P)      Equipment Currently Used at Home none (P)       Readmission within 30 days? No (P)      Patient currently being followed by outpatient case management? No (P)      Do you currently have service(s) that help you manage your care at home? No (P)      Do you take prescription medications? Yes (P)      Do you have prescription coverage? Yes (P)      Coverage Medicaid-[employer] (P)      Do you have any problems affording any of your prescribed medications? No (P)      Is the patient taking medications as prescribed? yes (P)      Who is going to help you get home at discharge? Family (P)      How do you get to doctors appointments? car, drives self (P)      Are you on dialysis? No (P)      Do you take coumadin? No (P)      Discharge Plan A Home with family (P)      Discharge Plan B Home (P)      DME Needed Upon Discharge  none (P)      Discharge Plan discussed with: Patient (P)      Transition of Care Barriers None (P)         Physical Activity    On average, how many days per week do you engage in moderate to strenuous exercise (like a brisk walk)? 0 days (P)      On average, how many minutes do you engage in exercise at this level? 0 min (P)         Financial Resource Strain    How hard is it for you to pay for the very basics like food, housing, medical care, and heating? Not hard at all (P)         Housing Stability    In the last 12 months, was there a time when you were not able to pay the mortgage or rent on time? No (P)      At any time in the past 12 months, were you homeless or living in a shelter (including now)? No (P)         Transportation Needs    Has the lack of transportation kept you from medical appointments, meetings, work or from getting things needed for daily living? No (P)         Food Insecurity    Within the past 12 months, you worried that your food would run out before you got the money to buy more. Never true (P)      Within the past 12 months, the food you bought just didn't last and you didn't have money to get more.  Never true (P)         Stress    Do you feel stress - tense, restless, nervous, or anxious, or unable to sleep at night because your mind is troubled all the time - these days? Not at all (P)         Social Isolation    How often do you feel lonely or isolated from those around you?  Never (P)         Alcohol Use    Q1: How often do you have a drink containing alcohol? Never (P)      Q2: How many drinks containing alcohol do you have on a typical day when you are drinking? Patient does not drink (P)      Q3: How often do you have six or more drinks on one occasion? Never (P)         Utilities    In the past 12 months has the electric, gas, oil, or water company threatened to shut off services in your home? No (P)         Health Literacy    How often do you need to have someone help you when you read instructions, pamphlets, or other written material from your doctor or pharmacy? Rarely (P)         OTHER    Name(s) of People in Home Grandmother (P)                       Late Entry for 11/17/24.     SW met with pt at bedside to complete Initial Discharge Planning Assessment. Pt. Lives in [place], [place] with his grandmother, Sherlyn Conti. Pt. States his mother passed away and he does not have a relationship with his father. No steps/stairs at the home. No DME, Home Health, Dialysis or Coumadin. Pt. drives, but aunt, Colette Conti [phone] provides transportation as well. SW noting pt applied for Social Security Disability benefits.     Discharge Plan A and Plan B have been determined by review of patient's clinical status, future medical and therapeutic needs, and coverage/benefits for post-acute care in coordination with multidisciplinary team members.     Gurwinder Rowe LMSW

## 2024-11-18 NOTE — PROGRESS NOTES
"[facility] - Cardiology [facility] Medicine  Progress Note    Patient Name: Michael Conti  MRN: [id number]  Patient Class: IP- Inpatient   Admission Date: 11/16/2024  Length of Stay: 1 days  Attending Physician: Tamara Hall MD  Primary Care Provider: Lele Verde NP (Inactive)        Subjective:     Principal Problem:Acute on chronic combined systolic and diastolic heart failure        HPI:  Michael Conti is a 24 year old man with non-ischemic cardiomyopathy with EF 10-15%, HTN, and obesity BMI of 55, who presents on 11/16 with worsening shortness of breath and fluid retention for the last  Patient reports these symptoms have been getting worse over the last few weeks. He was seen in Heart Transplant clinic on 11/15 and reported this to his cardiologist and was instructed to present to the hospital for admission for decompensated CHF, however, patient wanted to gather his belongings from home first prior to presenting. Patient reports that his symptoms are consistent with past HF exacerbations and he and his aunt at bedside report that the patient has been hospitalized in [place], where they live, innumerable times in the recent past. Patient denies any other symptoms apart from his SOB and edema; denies fever, chills, rhinorrhea, congestion, n/v/d. He endorses a non productive cough, but feels like there is phlegm he is unable to expectorate. He reports to me that for the last 3-4 days, he has also stopped taking some of his heart and fluid medications (Entresto and Bumex) due to misunderstanding his cardiologist regarding his Bumex; however he also admits that he stopped because he had "given up". It appears he had been on several medications at the same time that may not have been optimal as well, per his cardiologist's note. Regarding diet, he reports he has been trying to adhere closely to fluid restriction and has been generally trying to eat healthy, including baked chicken with sodium " substitutes like Mrs. Dash. He notes that his dry weight is 340 lbs, and he is currently at 357 lbs.    In the ED, patient afebrile and significantly hypertensive, up to 193/81. Labs notable for BNP 2400, troponin 0.166, K 3.0, and Cr 1.6. CXR with pulmonary vascular congestion. Patient given 1 dose of IV lasix 80 and admitted to Hospital Medicine for acute HF exacerbation.    Overview/Hospital Course:  Patient admitted for SOB 2/2 CHF exacerbation. BNP of 2,396, prior was 4,561 on 11/15. Given furosemide 80mg IV. Patient fell overnight. Camera installed in room.    Interval History: Patient experienced a fall last night after getting up from his chair and complaining of dizziness. Patient believes it occurred after he had been given his night medications. Explained to him that his BP meds could cause dizziness, and he should attempt to move his legs around and slowly get up in the future. Denies any chest pain or pressure, but does still complain of SOB and swelling.    Review of Systems   Constitutional: Negative.  Negative for chills, diaphoresis, fatigue and fever.   HENT: Negative.  Negative for congestion, ear pain, hearing loss, rhinorrhea and sore throat.    Eyes: Negative.    Respiratory:  Positive for shortness of breath. Negative for cough and chest tightness.    Cardiovascular: Negative.  Negative for chest pain and palpitations.   Gastrointestinal: Negative.  Negative for abdominal distention, abdominal pain, blood in stool, constipation, diarrhea, nausea and vomiting.   Genitourinary: Negative.  Negative for dysuria, frequency and hematuria.   Musculoskeletal: Negative.  Negative for back pain, joint swelling and neck pain.   Skin: Negative.    Neurological:  Positive for dizziness and light-headedness. Negative for seizures, syncope, weakness and headaches.   Psychiatric/Behavioral: Negative.       Objective:     Vital Signs (Most Recent):  Temp: 98 °F (36.7 °C) (11/17/24 0800)  Pulse: 102 (11/17/24  0800)  Resp: 20 (11/17/24 0800)  BP: (!) 157/106 (11/17/24 0800)  SpO2: 97 % (11/17/24 0800) Vital Signs (24h Range):  Temp:  [97.5 °F (36.4 °C)-98.3 °F (36.8 °C)] 98 °F (36.7 °C)  Pulse:  [] 102  Resp:  [15-22] 20  SpO2:  [95 %-100 %] 97 %  BP: (157-193)/() 157/106     Weight: (!) 159.9 kg (352 lb 8.3 oz)  Body mass index is 50.58 kg/m².    Intake/Output Summary (Last 24 hours) at 11/17/2024 0921  Last data filed at 11/17/2024 0500  Gross per 24 hour   Intake --   Output 2400 ml   Net -2400 ml         Physical Exam  Constitutional:       General: He is not in acute distress.     Appearance: Normal appearance. He is not ill-appearing.   HENT:      Head: Normocephalic.      Nose: Nose normal.   Eyes:      Extraocular Movements: Extraocular movements intact.   Cardiovascular:      Rate and Rhythm: Normal rate and regular rhythm.      Pulses: Normal pulses.      Heart sounds: Normal heart sounds. No murmur heard.     No friction rub. No gallop.   Pulmonary:      Effort: Pulmonary effort is normal. No respiratory distress.      Breath sounds: Normal breath sounds. No wheezing or rales.   Abdominal:      General: Bowel sounds are normal. There is no distension.      Palpations: Abdomen is soft.      Tenderness: There is no abdominal tenderness. There is no guarding.   Musculoskeletal:         General: Swelling present.      Cervical back: Normal range of motion. No rigidity or tenderness.      Right lower leg: Edema present.      Left lower leg: Edema present.   Skin:     General: Skin is warm.   Neurological:      General: No focal deficit present.      Mental Status: He is alert and oriented to person, place, and time.   Psychiatric:         Mood and Affect: Mood normal.         Behavior: Behavior normal.             Significant Labs: All pertinent labs within the past 24 hours have been reviewed.  CBC:   Recent Labs   Lab 11/16/24  2314 11/17/24  0623 11/18/24  0447   WBC 10.03 10.34 11.65   HGB 13.2*  13.8* 15.1   HCT 41.2 42.7 46.0    267 285     CMP:   Recent Labs   Lab 11/16/24  2314 11/17/24  0623 11/17/24  1932 11/18/24  0447    143 143 141   K 3.0* 3.0* 3.3* 3.6    103 105 105   CO2 23 26 26 24   * 103 116* 81   BUN 25* 24* 25* 23*   CREATININE 1.6* 1.4 1.3 1.4   CALCIUM 9.2 9.4 9.4 9.6   PROT 6.6 7.1  --  7.3   ALBUMIN 3.3* 3.5  --  3.5   BILITOT 0.5 0.5  --  0.7   ALKPHOS 88 113  --  93   AST 23 19  --  20   ALT 20 19  --  19   ANIONGAP 13 14 12 12       Significant Imaging: I have reviewed all pertinent imaging results/findings within the past 24 hours.    Assessment/Plan:      * Acute on chronic combined systolic and diastolic heart failure  24M w/hx of NICM with EF 10-15% and morbid obesity who presents with acute HF exacerbation. Worsening symptoms and exacerbation likely in part due to some medication non-adherence, but also possibly due to the fact that his CHF medication regimen was not optimized with his cardiologist reporting that he was taking two beta blockers at the same time as well as lisinopril along with Entresto. On presentation, BNP 2400, troponin 0.166, and CXR with evidence of pulmonary congestion. Exam with peripheral edema and mild rales auscultated, consistent with acute CHF.    Plan:  - Continue diuresis with IV lasix 80mg BID   - If no effect, consider increasing dose or adding Diuril or metolazone   - Titrate to UOP, target 2-3L UOP and net 1-2L negative daily  - Resume GDMT with Toprol  BID, Farxiga 10mg qd and increasing Entresto to 49-51 BID  - D/C'ed amlodipine  - Repeat Echo ordered  - Fluid restriction,1.5L and low Na diet  - Strict I/Os, daily weights and volume assessments; dry weight reported to be 340 lbs  - Monitor electrolytes with daily CMP, maintain Mg > 2 and K > 4.  - f/u with cardiology outpatient    Primary hypertension  Patients blood pressure range in the last 24 hours was: BP  Min: 103/74  Max: 158/86.The patient's inpatient  anti-hypertensive regimen is listed below:  Current Antihypertensives  metoprolol succinate (TOPROL-XL) 24 hr tablet 100 mg, 2 times daily, Oral  hydrALAZINE tablet 25 mg, 3 times daily, Oral  furosemide injection 80 mg, 2 times daily, Intravenous    Plan  - BP is uncontrolled, will adjust as follows: PRN IV hydralazine ordered given BP persistently > 190 in the ED  - Adjust regimen as needed to optimize BP control    Non-ischemic cardiomyopathy  Diagnosed in 2020. Aunt reports that patient has been in the hospital in [place] many times, and that there was a period he was hospitalized almost every other week for HF exacerbation. Unknown etiology. Established with [facility] cardiology in July 2024 and was deemed not to be a transplant candidate due to BMI > 35.    - See acute on chronic HF      Severe obesity (BMI >= 40)  Body mass index is 49.32 kg/m². Morbid obesity complicates all aspects of disease management from diagnostic modalities to treatment. Weight loss encouraged and health benefits explained to patient.           VTE Risk Mitigation (From admission, onward)           Ordered     enoxaparin injection 40 mg  Every 12 hours         11/17/24 0132     IP VTE HIGH RISK PATIENT  Once         11/17/24 0132     Place sequential compression device  Until discontinued         11/17/24 0132                    Discharge Planning   YU: 11/20/2024     Code Status: Full Code   Is the patient medically ready for discharge?:     Reason for patient still in hospital (select all that apply): Pending disposition                     Puja Du MD  PGY-1  Department of Hospital Medicine   [facility] - Cardiology Stepdown

## 2024-11-18 NOTE — PROGRESS NOTES
Heart Failure Transitional Care Clinic (HFTCC) Team notified of pt referral via Heart Failure [facility] (automated inbasket notification) .    Patient screened today 11/18/2024 by provider and LPN for enrollment to program.      Pt was deemed not a candidate for enrollment at this time related to patient is followed by [facility]-Advanced Heart Failure Section.HTS/Distance.      Pt will require additional follow up planning per primary team.     If pt status, diagnosis, or treatment plan changes , please place AMB referral to Heart Failure Transitional Care Clinic ([id number]) for HFTCC enrollment re-evalution.

## 2024-11-18 NOTE — ASSESSMENT & PLAN NOTE
Body mass index is 49.32 kg/m². Morbid obesity complicates all aspects of disease management from diagnostic modalities to treatment. Weight loss encouraged and health benefits explained to patient.

## 2024-11-18 NOTE — PLAN OF CARE
Problem: Adult Inpatient Plan of Care  Goal: Plan of Care Review  Outcome: Progressing  Goal: Patient-Specific Goal (Individualized)  Outcome: Progressing  Goal: Absence of Hospital-Acquired Illness or Injury  Outcome: Progressing  Goal: Optimal Comfort and Wellbeing  Outcome: Progressing  Goal: Readiness for Transition of Care  Outcome: Progressing     Problem: Bariatric Environmental Safety  Goal: Safety Maintained with Care  Outcome: Progressing     Problem: Wound  Goal: Optimal Coping  Outcome: Progressing  Goal: Optimal Functional Ability  Outcome: Progressing  Goal: Absence of Infection Signs and Symptoms  Outcome: Progressing  Goal: Improved Oral Intake  Outcome: Progressing  Goal: Optimal Pain Control and Function  Outcome: Progressing  Goal: Skin Health and Integrity  Outcome: Progressing  Goal: Optimal Wound Healing  Outcome: Progressing     Problem: Heart Failure  Goal: Optimal Coping  Outcome: Progressing     Problem: Heart Failure  Goal: Optimal Coping  Outcome: Progressing  Goal: Optimal Cardiac Output  Outcome: Progressing  Goal: Stable Heart Rate and Rhythm  Outcome: Progressing  Goal: Optimal Functional Ability  Outcome: Progressing  Goal: Fluid and Electrolyte Balance  Outcome: Progressing  Goal: Improved Oral Intake  Outcome: Progressing  Goal: Effective Oxygenation and Ventilation  Outcome: Progressing  Goal: Effective Breathing Pattern During Sleep  Outcome: Progressing     Problem: Fall Injury Risk  Goal: Absence of Fall and Fall-Related Injury  Outcome: Progressing

## 2024-11-18 NOTE — NURSING
"Pt repeatedly asking for cups of ice water when RN & PCT enter the room.  Pt re-educated about his 1500mL fluid restriction that he has surpassed.  This RN stood patient up from the recliner to place a chair alarm & found pt's empty ice pack underneath of him.  When this RN asked pt where the ice & water from the ice pack went, pt stated, "I drank it".  SW RN  "

## 2024-11-18 NOTE — NURSING
"Pt's K+ 3.3 on BMP after 80mEq PO K+ replacement on AM shift.  Dr. Bunch with Med Team 3 informed.  60mEq IV K+ replacement ordered.  JUDE RN    Initiated 1 of 6 rounds of 10mEq IV K+ replacement.  LFA IV flushed & patent.  Upon initiation of K+ infusion, pt immediately began writhing in pain stating his IV is "burning".  Pt states it is a 10/10 pain.  This RN lowered the infusion rate of the K+ from 100mL/hr to 75mL/hr, Y-sided the K+ with 25mL/hr NS, and applied an ice pack to pt's IV site, but pt still c/o 5/10 pain.  Dr. Bunch informed - IV K+ discontinued and PO K+ 40 mEq administered.  JUDE PARIS  "

## 2024-11-18 NOTE — PLAN OF CARE
Problem: Adult Inpatient Plan of Care  Goal: Plan of Care Review  Outcome: Progressing  Goal: Patient-Specific Goal (Individualized)  Outcome: Progressing  Goal: Absence of Hospital-Acquired Illness or Injury  Outcome: Progressing  Goal: Optimal Comfort and Wellbeing  Outcome: Progressing  Goal: Readiness for Transition of Care  Outcome: Progressing     Problem: Bariatric Environmental Safety  Goal: Safety Maintained with Care  Outcome: Progressing     Problem: Wound  Goal: Optimal Coping  Outcome: Progressing  Goal: Optimal Functional Ability  Outcome: Progressing  Goal: Absence of Infection Signs and Symptoms  Outcome: Progressing  Goal: Improved Oral Intake  Outcome: Progressing  Goal: Optimal Pain Control and Function  Outcome: Progressing  Goal: Skin Health and Integrity  Outcome: Progressing  Goal: Optimal Wound Healing  Outcome: Progressing     Problem: Heart Failure  Goal: Optimal Coping  Outcome: Progressing  Goal: Optimal Cardiac Output  Outcome: Progressing  Goal: Stable Heart Rate and Rhythm  Outcome: Progressing  Goal: Optimal Functional Ability  Outcome: Progressing  Goal: Fluid and Electrolyte Balance  Outcome: Progressing  Goal: Improved Oral Intake  Outcome: Progressing  Goal: Effective Oxygenation and Ventilation  Outcome: Progressing  Goal: Effective Breathing Pattern During Sleep  Outcome: Progressing     Problem: Fall Injury Risk  Goal: Absence of Fall and Fall-Related Injury  Outcome: Progressing     Problem: [facility]  Goal: Collaboration with the Healthcare Team  Outcome: Progressing

## 2024-11-18 NOTE — NURSING
0850: Patient up to shower, patient's gait steady, denies dizziness. /82. Patient verbalizes understanding on safety measures.     0915: Patient back in chair with telemetry reapplied. PIV intact. Posey alarm in place and functioning WNL, patient up in chair. Extensive education provided on fall safety measures and fluid restrictions. Patient reports he does not remember what the doctor discussed with him other than that the doctor said he could have a pitcher of water. Tele sitter in place but patient on video monitoring waiting list    0950: Rec'd verification per Dr. Du that he did tell patient he could have a pitcher of water but only one pitcher for the entire day with no other liquids. Patient has already had 480ml this shift with breakfast and medications, patient drank melted ice pack overnight per RN report. RN will continue to assess patient's adherence to fluid restriction and judgement on being able to comply.     1417: Patient off floor for echocardiogram     1800: Patient free from falls throughout shift, compliant with calling nurse prior to getting up/moving around room. Patient reported no dizziness throughout shift, steady gait. CB in reach.     1830: Tele sitter alarming and and posey alarm going off. Patient agitated and cussing saying he is no longer dizzy (the reason he reported falling during night shift 11/17/24). Refused tele sitter and patient removed Prue alarm himself from chair.

## 2024-11-18 NOTE — NURSING
Telesitter camera placed in patient's room for safety.  Telesitter called - patient is on waitlist for monitoring.  JUDE PAIRS

## 2024-11-18 NOTE — ASSESSMENT & PLAN NOTE
Diagnosed in 2020. Aunt reports that patient has been in the hospital in [place] many times, and that there was a period he was hospitalized almost every other week for HF exacerbation. Unknown etiology. Established with [facility] cardiology in July 2024 and was deemed not to be a transplant candidate due to BMI > 35.    - See acute on chronic HF

## 2024-11-18 NOTE — NURSING
Patient requesting to take a shower. Nurse informed patient that he could not take a shower because patient has c/o dizziness. Nurse informed patient when he is no longer feeling dizzy it will be safe for him to take a shower. Patient needs reinforcement but did verbalized understanding.

## 2024-11-18 NOTE — ASSESSMENT & PLAN NOTE
Patients blood pressure range in the last 24 hours was: BP  Min: 103/74  Max: 158/86.The patient's inpatient anti-hypertensive regimen is listed below:  Current Antihypertensives  metoprolol succinate (TOPROL-XL) 24 hr tablet 100 mg, 2 times daily, Oral  hydrALAZINE tablet 25 mg, 3 times daily, Oral  furosemide injection 80 mg, 2 times daily, Intravenous    Plan  - BP is uncontrolled, will adjust as follows: PRN IV hydralazine ordered given BP persistently > 190 in the ED  - Adjust regimen as needed to optimize BP control

## 2024-11-19 LAB
ALBUMIN SERPL BCP-MCNC: 3.2 G/DL (ref 3.5–5.2)
ALP SERPL-CCNC: 91 U/L (ref 40–150)
ALT SERPL W/O P-5'-P-CCNC: 18 U/L (ref 10–44)
ANION GAP SERPL CALC-SCNC: 10 MMOL/L (ref 8–16)
ANION GAP SERPL CALC-SCNC: 10 MMOL/L (ref 8–16)
AST SERPL-CCNC: 19 U/L (ref 10–40)
BASOPHILS # BLD AUTO: 0.05 K/UL (ref 0–0.2)
BASOPHILS NFR BLD: 0.5 % (ref 0–1.9)
BILIRUB SERPL-MCNC: 0.5 MG/DL (ref 0.1–1)
BUN SERPL-MCNC: 25 MG/DL (ref 6–20)
BUN SERPL-MCNC: 26 MG/DL (ref 6–20)
CALCIUM SERPL-MCNC: 9.5 MG/DL (ref 8.7–10.5)
CALCIUM SERPL-MCNC: 9.8 MG/DL (ref 8.7–10.5)
CHLORIDE SERPL-SCNC: 105 MMOL/L (ref 95–110)
CHLORIDE SERPL-SCNC: 105 MMOL/L (ref 95–110)
CO2 SERPL-SCNC: 22 MMOL/L (ref 23–29)
CO2 SERPL-SCNC: 24 MMOL/L (ref 23–29)
CREAT SERPL-MCNC: 1.6 MG/DL (ref 0.5–1.4)
CREAT SERPL-MCNC: 1.6 MG/DL (ref 0.5–1.4)
DIFFERENTIAL METHOD BLD: ABNORMAL
EOSINOPHIL # BLD AUTO: 0.2 K/UL (ref 0–0.5)
EOSINOPHIL NFR BLD: 1.9 % (ref 0–8)
ERYTHROCYTE [DISTWIDTH] IN BLOOD BY AUTOMATED COUNT: 14.9 % (ref 11.5–14.5)
EST. GFR  (NO RACE VARIABLE): >60 ML/MIN/1.73 M^2
EST. GFR  (NO RACE VARIABLE): >60 ML/MIN/1.73 M^2
GLUCOSE SERPL-MCNC: 80 MG/DL (ref 70–110)
GLUCOSE SERPL-MCNC: 91 MG/DL (ref 70–110)
HCT VFR BLD AUTO: 45.8 % (ref 40–54)
HGB BLD-MCNC: 14.3 G/DL (ref 14–18)
IMM GRANULOCYTES # BLD AUTO: 0.03 K/UL (ref 0–0.04)
IMM GRANULOCYTES NFR BLD AUTO: 0.3 % (ref 0–0.5)
INFLUENZA A, MOLECULAR: NOT DETECTED
INFLUENZA B, MOLECULAR: NOT DETECTED
LYMPHOCYTES # BLD AUTO: 2.1 K/UL (ref 1–4.8)
LYMPHOCYTES NFR BLD: 19.8 % (ref 18–48)
MAGNESIUM SERPL-MCNC: 2.1 MG/DL (ref 1.6–2.6)
MCH RBC QN AUTO: 26.4 PG (ref 27–31)
MCHC RBC AUTO-ENTMCNC: 31.2 G/DL (ref 32–36)
MCV RBC AUTO: 85 FL (ref 82–98)
MONOCYTES # BLD AUTO: 1.1 K/UL (ref 0.3–1)
MONOCYTES NFR BLD: 10.5 % (ref 4–15)
NEUTROPHILS # BLD AUTO: 7.2 K/UL (ref 1.8–7.7)
NEUTROPHILS NFR BLD: 67 % (ref 38–73)
NRBC BLD-RTO: 0 /100 WBC
OHS QRS DURATION: 96 MS
OHS QTC CALCULATION: 489 MS
PHOSPHATE SERPL-MCNC: 4 MG/DL (ref 2.7–4.5)
PLATELET # BLD AUTO: 284 K/UL (ref 150–450)
PMV BLD AUTO: 10.2 FL (ref 9.2–12.9)
POTASSIUM SERPL-SCNC: 3.5 MMOL/L (ref 3.5–5.1)
POTASSIUM SERPL-SCNC: 3.8 MMOL/L (ref 3.5–5.1)
PROT SERPL-MCNC: 6.8 G/DL (ref 6–8.4)
RBC # BLD AUTO: 5.42 M/UL (ref 4.6–6.2)
RSV AG BY MOLECULAR METHOD: NOT DETECTED
SARS-COV-2 RNA RESP QL NAA+PROBE: NOT DETECTED
SODIUM SERPL-SCNC: 137 MMOL/L (ref 136–145)
SODIUM SERPL-SCNC: 139 MMOL/L (ref 136–145)
TROPONIN I SERPL DL<=0.01 NG/ML-MCNC: 0.16 NG/ML (ref 0–0.03)
WBC # BLD AUTO: 10.79 K/UL (ref 3.9–12.7)

## 2024-11-19 PROCEDURE — 80053 COMPREHEN METABOLIC PANEL: CPT

## 2024-11-19 PROCEDURE — 36415 COLL VENOUS BLD VENIPUNCTURE: CPT

## 2024-11-19 PROCEDURE — 93005 ELECTROCARDIOGRAM TRACING: CPT

## 2024-11-19 PROCEDURE — 20600001 HC STEP DOWN PRIVATE ROOM

## 2024-11-19 PROCEDURE — 25000003 PHARM REV CODE 250

## 2024-11-19 PROCEDURE — 36415 COLL VENOUS BLD VENIPUNCTURE: CPT | Mod: XB | Performed by: HOSPITALIST

## 2024-11-19 PROCEDURE — 25000242 PHARM REV CODE 250 ALT 637 W/ HCPCS

## 2024-11-19 PROCEDURE — 83735 ASSAY OF MAGNESIUM: CPT

## 2024-11-19 PROCEDURE — 84100 ASSAY OF PHOSPHORUS: CPT

## 2024-11-19 PROCEDURE — 85025 COMPLETE CBC W/AUTO DIFF WBC: CPT

## 2024-11-19 PROCEDURE — 80048 BASIC METABOLIC PNL TOTAL CA: CPT | Mod: XB

## 2024-11-19 PROCEDURE — 94761 N-INVAS EAR/PLS OXIMETRY MLT: CPT

## 2024-11-19 PROCEDURE — 25000003 PHARM REV CODE 250: Performed by: HOSPITALIST

## 2024-11-19 PROCEDURE — 94799 UNLISTED PULMONARY SVC/PX: CPT

## 2024-11-19 PROCEDURE — 94640 AIRWAY INHALATION TREATMENT: CPT

## 2024-11-19 PROCEDURE — 63600175 PHARM REV CODE 636 W HCPCS

## 2024-11-19 PROCEDURE — 93010 ELECTROCARDIOGRAM REPORT: CPT | Mod: ,,, | Performed by: INTERNAL MEDICINE

## 2024-11-19 PROCEDURE — 0241U SARS-COV2 (COVID) WITH FLU/RSV BY PCR: CPT

## 2024-11-19 PROCEDURE — 84484 ASSAY OF TROPONIN QUANT: CPT | Performed by: HOSPITALIST

## 2024-11-19 PROCEDURE — 99900035 HC TECH TIME PER 15 MIN (STAT)

## 2024-11-19 RX ORDER — TORSEMIDE 20 MG/1
20 TABLET ORAL 2 TIMES DAILY
Status: ON HOLD | COMMUNITY
End: 2024-11-21

## 2024-11-19 RX ORDER — SPIRONOLACTONE 50 MG/1
50 TABLET, FILM COATED ORAL DAILY
Status: ON HOLD | COMMUNITY
End: 2024-11-21

## 2024-11-19 RX ORDER — FUROSEMIDE 10 MG/ML
100 INJECTION INTRAMUSCULAR; INTRAVENOUS 2 TIMES DAILY
Status: DISCONTINUED | OUTPATIENT
Start: 2024-11-19 | End: 2024-11-19

## 2024-11-19 RX ORDER — FUROSEMIDE 10 MG/ML
80 INJECTION INTRAMUSCULAR; INTRAVENOUS 3 TIMES DAILY
Status: DISCONTINUED | OUTPATIENT
Start: 2024-11-19 | End: 2024-11-21 | Stop reason: HOSPADM

## 2024-11-19 RX ORDER — SODIUM CHLORIDE FOR INHALATION 3 %
4 VIAL, NEBULIZER (ML) INHALATION ONCE
Status: COMPLETED | OUTPATIENT
Start: 2024-11-19 | End: 2024-11-19

## 2024-11-19 RX ORDER — POTASSIUM CHLORIDE 750 MG/1
30 CAPSULE, EXTENDED RELEASE ORAL ONCE
Status: COMPLETED | OUTPATIENT
Start: 2024-11-19 | End: 2024-11-19

## 2024-11-19 RX ORDER — POTASSIUM CHLORIDE 20 MEQ/1
40 TABLET, EXTENDED RELEASE ORAL 2 TIMES DAILY
Status: DISCONTINUED | OUTPATIENT
Start: 2024-11-19 | End: 2024-11-19

## 2024-11-19 RX ORDER — FUROSEMIDE 20 MG/1
20 TABLET ORAL 2 TIMES DAILY
Status: ON HOLD | COMMUNITY
End: 2024-11-21 | Stop reason: HOSPADM

## 2024-11-19 RX ADMIN — METOPROLOL SUCCINATE 100 MG: 100 TABLET, EXTENDED RELEASE ORAL at 08:11

## 2024-11-19 RX ADMIN — METOPROLOL SUCCINATE 100 MG: 100 TABLET, EXTENDED RELEASE ORAL at 09:11

## 2024-11-19 RX ADMIN — SACUBITRIL AND VALSARTAN 1 TABLET: 49; 51 TABLET, FILM COATED ORAL at 08:11

## 2024-11-19 RX ADMIN — ENOXAPARIN SODIUM 40 MG: 40 INJECTION SUBCUTANEOUS at 09:11

## 2024-11-19 RX ADMIN — ENOXAPARIN SODIUM 40 MG: 40 INJECTION SUBCUTANEOUS at 08:11

## 2024-11-19 RX ADMIN — ISOSORBIDE DINITRATE: 20 TABLET ORAL at 09:11

## 2024-11-19 RX ADMIN — FUROSEMIDE 100 MG: 10 INJECTION, SOLUTION INTRAMUSCULAR; INTRAVENOUS at 08:11

## 2024-11-19 RX ADMIN — FUROSEMIDE 80 MG: 10 INJECTION, SOLUTION INTRAVENOUS at 09:11

## 2024-11-19 RX ADMIN — DAPAGLIFLOZIN 10 MG: 10 TABLET, FILM COATED ORAL at 08:11

## 2024-11-19 RX ADMIN — HYDRALAZINE HYDROCHLORIDE 25 MG: 25 TABLET ORAL at 08:11

## 2024-11-19 RX ADMIN — GUAIFENESIN 600 MG: 600 TABLET, EXTENDED RELEASE ORAL at 09:11

## 2024-11-19 RX ADMIN — FUROSEMIDE 80 MG: 10 INJECTION, SOLUTION INTRAVENOUS at 02:11

## 2024-11-19 RX ADMIN — SODIUM CHLORIDE SOLN NEBU 3% 4 ML: 3 NEBU SOLN at 05:11

## 2024-11-19 RX ADMIN — ISOSORBIDE DINITRATE: 20 TABLET ORAL at 03:11

## 2024-11-19 RX ADMIN — POTASSIUM CHLORIDE 30 MEQ: 750 CAPSULE, EXTENDED RELEASE ORAL at 03:11

## 2024-11-19 RX ADMIN — POTASSIUM CHLORIDE 40 MEQ: 1500 TABLET, EXTENDED RELEASE ORAL at 08:11

## 2024-11-19 RX ADMIN — SACUBITRIL AND VALSARTAN 1 TABLET: 49; 51 TABLET, FILM COATED ORAL at 09:11

## 2024-11-19 NOTE — PROGRESS NOTES
[facility] - Cardiology Stepdown  Wound Care    Patient Name:  Michael Conti   MRN:  [id number]  Date: 11/19/2024  Diagnosis: Acute on chronic combined systolic and diastolic heart failure    History:     Past Medical History:   Diagnosis Date    CHF (congestive heart failure)     Hypertension        Social History     Socioeconomic History    Marital status: Single   Tobacco Use    Smoking status: Never     Passive exposure: Never    Smokeless tobacco: Never   Substance and Sexual Activity    Alcohol use: Yes    Drug use: Never     Social Drivers of Health     Financial Resource Strain: Low Risk  (11/17/2024)    Overall Financial Resource Strain (CARDIA)     Difficulty of Paying Living Expenses: Not hard at all   Food Insecurity: No Food Insecurity (11/17/2024)    Hunger Vital Sign     Worried About Running Out of Food in the Last Year: Never true     Ran Out of Food in the Last Year: Never true   Transportation Needs: No Transportation Needs (11/17/2024)    TRANSPORTATION NEEDS     Transportation : No   Physical Activity: Inactive (11/17/2024)    Exercise Vital Sign     Days of Exercise per Week: 0 days     Minutes of Exercise per Session: 0 min   Stress: No Stress Concern Present (11/17/2024)    [employer] - Occupational Stress Questionnaire     Feeling of Stress : Not at all   Housing Stability: Low Risk  (11/17/2024)    Housing Stability Vital Sign     Unable to Pay for Housing in the Last Year: No     Homeless in the Last Year: No       Precautions:     Allergies as of 11/16/2024    (No Known Allergies)       [facility] Assessment Details/Treatment   Patient seen for wound care consultation.  Chart reviewed for this encounter.  See flow sheet for findings.    Pt up in the chair and agreeable to care. Partial thickness skin loss to the left calf with a pink and dry wound bed. The patient reports scratching his leg on his car. The wound was cleansed with NS, wound'dres applied and covered with  a bordered foam dressing.    Recommendations:  - Left calf: bedside nursing to cleanse with NS, pat dry and apply a foam dressing every 5 days     11/19/24 1030        Wound 11/17/24 0500 Traumatic Left Calf   Date First Assessed/Time First Assessed: 11/17/24 0500   Present on Original Admission: Yes  Primary Wound Type: Traumatic  Side: Left  Location: Calf  Is this injury device related?: No   Wound Image    Dressing Appearance Open to air   Drainage Amount Scant   Appearance Pink;Dry   Tissue loss description Partial thickness   Periwound Area Intact;Dry   Care Cleansed with:;Sterile normal saline   Dressing Applied;Hydrogel;Foam     Recommendations made to primary team for above plan via secure chat. Wound care will follow-up as needed.     11/19/2024

## 2024-11-19 NOTE — SUBJECTIVE & OBJECTIVE
Interval History: NAEON. AAO x3. Patient sitting in chair and playing his game. Denies any HA, chest pain or pressure, hemoptysis, abdominal pain, or weakness. Patient was taught on fluid intake and output and the importance of being on a fluid restriction.      Review of Systems   Constitutional: Negative.  Negative for chills, diaphoresis, fatigue and fever.   HENT: Negative.  Negative for congestion, ear pain, hearing loss, rhinorrhea and sore throat.    Eyes: Negative.    Respiratory:  Positive for shortness of breath. Negative for cough and chest tightness.    Cardiovascular: Negative.  Negative for chest pain and palpitations.   Gastrointestinal: Negative.  Negative for abdominal distention, abdominal pain, blood in stool, constipation, diarrhea, nausea and vomiting.   Genitourinary: Negative.  Negative for dysuria, frequency and hematuria.   Musculoskeletal: Negative.  Negative for back pain, joint swelling and neck pain.   Skin: Negative.    Neurological:  Negative for dizziness, seizures, syncope, weakness, light-headedness and headaches.   Psychiatric/Behavioral: Negative.       Objective:     Vital Signs (Most Recent):  Temp: 98.6 °F (37 °C) (11/19/24 0655)  Pulse: (!) 114 (11/19/24 0851)  Resp: 20 (11/19/24 0655)  BP: (!) 149/85 (Recheck by RN) (11/19/24 0851)  SpO2: 98 % (11/19/24 0655) Vital Signs (24h Range):  Temp:  [97.7 °F (36.5 °C)-98.6 °F (37 °C)] 98.6 °F (37 °C)  Pulse:  [] 114  Resp:  [15-22] 20  SpO2:  [96 %-99 %] 98 %  BP: (136-151)/(76-97) 149/85     Weight: (!) 158.3 kg (348 lb 15.8 oz)  Body mass index is 50.07 kg/m².    Intake/Output Summary (Last 24 hours) at 11/19/2024 0907  Last data filed at 11/19/2024 0813  Gross per 24 hour   Intake 1919 ml   Output 1845 ml   Net 74 ml         Physical Exam  Constitutional:       General: He is not in acute distress.     Appearance: Normal appearance. He is not ill-appearing.   HENT:      Head: Normocephalic.      Nose: Nose normal.   Eyes:       Extraocular Movements: Extraocular movements intact.   Cardiovascular:      Rate and Rhythm: Normal rate and regular rhythm.      Pulses: Normal pulses.      Heart sounds: Normal heart sounds. No murmur heard.     No friction rub. No gallop.   Pulmonary:      Effort: Pulmonary effort is normal. No respiratory distress.      Breath sounds: Normal breath sounds. No wheezing or rales.   Abdominal:      General: Bowel sounds are normal. There is no distension.      Palpations: Abdomen is soft.      Tenderness: There is no abdominal tenderness. There is no guarding.   Musculoskeletal:         General: Swelling present.      Cervical back: Normal range of motion. No rigidity or tenderness.      Right lower leg: Edema present.      Left lower leg: Edema present.   Skin:     General: Skin is warm.   Neurological:      General: No focal deficit present.      Mental Status: He is alert and oriented to person, place, and time.   Psychiatric:         Mood and Affect: Mood normal.         Behavior: Behavior normal.             Significant Labs: All pertinent labs within the past 24 hours have been reviewed.  CBC:   Recent Labs   Lab 11/18/24 0447 11/19/24  0339   WBC 11.65 10.79   HGB 15.1 14.3   HCT 46.0 45.8    284     CMP:   Recent Labs   Lab 11/17/24  1932 11/18/24 0447 11/19/24  0339    141 137   K 3.3* 3.6 3.5    105 105   CO2 26 24 22*   * 81 80   BUN 25* 23* 25*   CREATININE 1.3 1.4 1.6*   CALCIUM 9.4 9.6 9.8   PROT  --  7.3 6.8   ALBUMIN  --  3.5 3.2*   BILITOT  --  0.7 0.5   ALKPHOS  --  93 91   AST  --  20 19   ALT  --  19 18   ANIONGAP 12 12 10     Troponin:   Recent Labs   Lab 11/19/24  0551   TROPONINI 0.158*       Significant Imaging: I have reviewed all pertinent imaging results/findings within the past 24 hours.  EKG: I have reviewed all pertinent results/findings within the past 24 hours and my personal findings are: sinus tachycardia; no ischemic changes   volitional

## 2024-11-19 NOTE — ASSESSMENT & PLAN NOTE
Patients blood pressure range in the last 24 hours was: BP  Min: 136/80  Max: 151/97.The patient's inpatient anti-hypertensive regimen is listed below:  Current Antihypertensives  metoprolol succinate (TOPROL-XL) 24 hr tablet 100 mg, 2 times daily, Oral  furosemide injection 80 mg, 3 times daily, Intravenous  hydrALAZINE 10 mg 1 tablet, hydrALAZINE 25 mg 1 tablet, isorsorbide dinitrate 20 mg 1 tablet combination, 3 times daily, Oral    Plan  - BP is uncontrolled, will adjust as follows: PRN IV hydralazine ordered given BP persistently > 190 in the ED  - Adjust regimen as needed to optimize BP control  - starting Bidil 20/37.5mg

## 2024-11-19 NOTE — PLAN OF CARE
AAOX4,VSS,O2 sats 96% on room air.Plan of care discussed with patient. Fall precautions in place. Patient being diuresed, IVP Lasix. Education provided on importance of fluid restriction compliance, patient non-compliant.Life vest consult placed for patient this admission. Patient has no complaints of chest pain, palpitations, dizziness, lightheadedness, or n/v. Intermittent complaints of SOB, and orthopnea noted. Discussed medications and care. Patient has no questions at this time.Pt visualised and stable, with no acute distress noted.Call light within reach.Pt resting,up in recliner chair, wheels locked. No family by the bedside.Will continue to monitor through the shift, plan of care ongoing.      Problem: Adult Inpatient Plan of Care  Goal: Plan of Care Review  Outcome: Met  Goal: Patient-Specific Goal (Individualized)  Outcome: Met  Goal: Absence of Hospital-Acquired Illness or Injury  Outcome: Met  Goal: Optimal Comfort and Wellbeing  Outcome: Met  Goal: Readiness for Transition of Care  Outcome: Met     Problem: Bariatric Environmental Safety  Goal: Safety Maintained with Care  Outcome: Met     Problem: Wound  Goal: Optimal Coping  Outcome: Met  Goal: Optimal Functional Ability  Outcome: Met  Goal: Absence of Infection Signs and Symptoms  Outcome: Met  Goal: Improved Oral Intake  Outcome: Progressing  Goal: Optimal Pain Control and Function  Outcome: Met  Goal: Skin Health and Integrity  Outcome: Met  Goal: Optimal Wound Healing  Outcome: Met     Problem: Heart Failure  Goal: Optimal Coping  Outcome: Met  Goal: Optimal Cardiac Output  Outcome: Not Progressing  Goal: Stable Heart Rate and Rhythm  Outcome: Not Progressing  Goal: Optimal Functional Ability  Outcome: Met  Goal: Fluid and Electrolyte Balance  Outcome: Progressing  Goal: Improved Oral Intake  Outcome: Met  Goal: Effective Oxygenation and Ventilation  Outcome: Progressing  Goal: Effective Breathing Pattern During Sleep  Outcome: Progressing      Problem: Fall Injury Risk  Goal: Absence of Fall and Fall-Related Injury  Outcome: Met     Problem: [facility]  Goal: Collaboration with the Healthcare Team  Outcome: Met

## 2024-11-19 NOTE — CONSULTS
NIAS consulted to place PIV using USG.   0920- on arrival at pt room, pt in bathroom.   1000 - on arrival at pt room, pt in bathroom.   NIAS will follow up.

## 2024-11-19 NOTE — PROGRESS NOTES
"[facility] - Cardiology [facility] Medicine  Progress Note    Patient Name: Michael Conti  MRN: [id number]  Patient Class: IP- Inpatient   Admission Date: 11/16/2024  Length of Stay: 2 days  Attending Physician: Tamara Hall MD  Primary Care Provider: Lele Verde NP (Inactive)        Subjective:     Principal Problem:Acute on chronic combined systolic and diastolic heart failure        HPI:  Michael Conti is a 24 year old man with non-ischemic cardiomyopathy with EF 10-15%, HTN, and obesity BMI of 55, who presents on 11/16 with worsening shortness of breath and fluid retention for the last  Patient reports these symptoms have been getting worse over the last few weeks. He was seen in Heart Transplant clinic on 11/15 and reported this to his cardiologist and was instructed to present to the hospital for admission for decompensated CHF, however, patient wanted to gather his belongings from home first prior to presenting. Patient reports that his symptoms are consistent with past HF exacerbations and he and his aunt at bedside report that the patient has been hospitalized in [place], where they live, innumerable times in the recent past. Patient denies any other symptoms apart from his SOB and edema; denies fever, chills, rhinorrhea, congestion, n/v/d. He endorses a non productive cough, but feels like there is phlegm he is unable to expectorate. He reports to me that for the last 3-4 days, he has also stopped taking some of his heart and fluid medications (Entresto and Bumex) due to misunderstanding his cardiologist regarding his Bumex; however he also admits that he stopped because he had "given up". It appears he had been on several medications at the same time that may not have been optimal as well, per his cardiologist's note. Regarding diet, he reports he has been trying to adhere closely to fluid restriction and has been generally trying to eat healthy, including baked chicken with sodium " substitutes like Mrs. Dash. He notes that his dry weight is 340 lbs, and he is currently at 357 lbs.    In the ED, patient afebrile and significantly hypertensive, up to 193/81. Labs notable for BNP 2400, troponin 0.166, K 3.0, and Cr 1.6. CXR with pulmonary vascular congestion. Patient given 1 dose of IV lasix 80 and admitted to Hospital Medicine for acute HF exacerbation.    Overview/Hospital Course:  Patient admitted for SOB 2/2 CHF exacerbation. BNP of 2,396, prior was 4,561 on 11/15. Given furosemide 80mg IV. Patient fell overnight. Camera installed in room. Starting Bidil and attempting to get patient back on a Life Vest.    Interval History: NAEON. AAO x3. Patient sitting in chair and playing his game. Denies any HA, chest pain or pressure, hemoptysis, abdominal pain, or weakness. Patient was taught on fluid intake and output and the importance of being on a fluid restriction.      Review of Systems   Constitutional: Negative.  Negative for chills, diaphoresis, fatigue and fever.   HENT: Negative.  Negative for congestion, ear pain, hearing loss, rhinorrhea and sore throat.    Eyes: Negative.    Respiratory:  Positive for shortness of breath. Negative for cough and chest tightness.    Cardiovascular: Negative.  Negative for chest pain and palpitations.   Gastrointestinal: Negative.  Negative for abdominal distention, abdominal pain, blood in stool, constipation, diarrhea, nausea and vomiting.   Genitourinary: Negative.  Negative for dysuria, frequency and hematuria.   Musculoskeletal: Negative.  Negative for back pain, joint swelling and neck pain.   Skin: Negative.    Neurological:  Negative for dizziness, seizures, syncope, weakness, light-headedness and headaches.   Psychiatric/Behavioral: Negative.       Objective:     Vital Signs (Most Recent):  Temp: 98.6 °F (37 °C) (11/19/24 0655)  Pulse: (!) 114 (11/19/24 0851)  Resp: 20 (11/19/24 0655)  BP: (!) 149/85 (Recheck by RN) (11/19/24 0851)  SpO2: 98 %  (11/19/24 0655) Vital Signs (24h Range):  Temp:  [97.7 °F (36.5 °C)-98.6 °F (37 °C)] 98.6 °F (37 °C)  Pulse:  [] 114  Resp:  [15-22] 20  SpO2:  [96 %-99 %] 98 %  BP: (136-151)/(76-97) 149/85     Weight: (!) 158.3 kg (348 lb 15.8 oz)  Body mass index is 50.07 kg/m².    Intake/Output Summary (Last 24 hours) at 11/19/2024 0907  Last data filed at 11/19/2024 0813  Gross per 24 hour   Intake 1919 ml   Output 1845 ml   Net 74 ml         Physical Exam  Constitutional:       General: He is not in acute distress.     Appearance: Normal appearance. He is not ill-appearing.   HENT:      Head: Normocephalic.      Nose: Nose normal.   Eyes:      Extraocular Movements: Extraocular movements intact.   Cardiovascular:      Rate and Rhythm: Normal rate and regular rhythm.      Pulses: Normal pulses.      Heart sounds: Normal heart sounds. No murmur heard.     No friction rub. No gallop.   Pulmonary:      Effort: Pulmonary effort is normal. No respiratory distress.      Breath sounds: Normal breath sounds. No wheezing or rales.   Abdominal:      General: Bowel sounds are normal. There is no distension.      Palpations: Abdomen is soft.      Tenderness: There is no abdominal tenderness. There is no guarding.   Musculoskeletal:         General: Swelling present.      Cervical back: Normal range of motion. No rigidity or tenderness.      Right lower leg: Edema present.      Left lower leg: Edema present.   Skin:     General: Skin is warm.   Neurological:      General: No focal deficit present.      Mental Status: He is alert and oriented to person, place, and time.   Psychiatric:         Mood and Affect: Mood normal.         Behavior: Behavior normal.             Significant Labs: All pertinent labs within the past 24 hours have been reviewed.  CBC:   Recent Labs   Lab 11/18/24 0447 11/19/24  0339   WBC 11.65 10.79   HGB 15.1 14.3   HCT 46.0 45.8    284     CMP:   Recent Labs   Lab 11/17/24  1932 11/18/24 0447  11/19/24  0339    141 137   K 3.3* 3.6 3.5    105 105   CO2 26 24 22*   * 81 80   BUN 25* 23* 25*   CREATININE 1.3 1.4 1.6*   CALCIUM 9.4 9.6 9.8   PROT  --  7.3 6.8   ALBUMIN  --  3.5 3.2*   BILITOT  --  0.7 0.5   ALKPHOS  --  93 91   AST  --  20 19   ALT  --  19 18   ANIONGAP 12 12 10     Troponin:   Recent Labs   Lab 11/19/24  0551   TROPONINI 0.158*       Significant Imaging: I have reviewed all pertinent imaging results/findings within the past 24 hours.  EKG: I have reviewed all pertinent results/findings within the past 24 hours and my personal findings are: sinus tachycardia; no ischemic changes    Assessment/Plan:      * Acute on chronic combined systolic and diastolic heart failure  24M w/hx of NICM with EF 10-15% and morbid obesity who presents with acute HF exacerbation. Worsening symptoms and exacerbation likely in part due to some medication non-adherence, but also possibly due to the fact that his CHF medication regimen was not optimized with his cardiologist reporting that he was taking two beta blockers at the same time as well as lisinopril along with Entresto. On presentation, BNP 2400, troponin 0.166, and CXR with evidence of pulmonary congestion. Exam with peripheral edema and mild rales auscultated, consistent with acute CHF.    Plan:  - Continue diuresis with IV lasix 80mg; will adjust to TID   - If no effect, consider increasing dose or adding Diuril or metolazone   - Titrate to UOP, target 2-3L UOP and net 1-2L negative daily  - Resume GDMT with Toprol  BID, Farxiga 10mg qd and increasing Entresto to 49-51 BID  - Repeat Echo shows moderate pulmonary HTN with pressures of 51 mmHg  - Fluid restriction,1.5L and low Na diet  - Strict I/Os, daily weights and volume assessments; dry weight reported to be 340 lbs  - Monitor electrolytes with daily CMP, maintain Mg > 2 and K > 4.  - f/u with cardiology outpatient  - consult placed for life vest    Primary  hypertension  Patients blood pressure range in the last 24 hours was: BP  Min: 136/80  Max: 151/97.The patient's inpatient anti-hypertensive regimen is listed below:  Current Antihypertensives  metoprolol succinate (TOPROL-XL) 24 hr tablet 100 mg, 2 times daily, Oral  furosemide injection 80 mg, 3 times daily, Intravenous  hydrALAZINE 10 mg 1 tablet, hydrALAZINE 25 mg 1 tablet, isorsorbide dinitrate 20 mg 1 tablet combination, 3 times daily, Oral    Plan  - BP is uncontrolled, will adjust as follows: PRN IV hydralazine ordered given BP persistently > 190 in the ED  - Adjust regimen as needed to optimize BP control  - starting Bidil 20/37.5mg    Non-ischemic cardiomyopathy  Diagnosed in 2020. Aunt reports that patient has been in the hospital in [place] many times, and that there was a period he was hospitalized almost every other week for HF exacerbation. Unknown etiology. Established with [facility] in July 2024 and was deemed not to be a transplant candidate due to BMI > 35.    - See acute on chronic HF      Severe obesity (BMI >= 40)  Body mass index is 49.32 kg/m². Morbid obesity complicates all aspects of disease management from diagnostic modalities to treatment. Weight loss encouraged and health benefits explained to patient.           VTE Risk Mitigation (From admission, onward)           Ordered     enoxaparin injection 40 mg  Every 12 hours         11/17/24 0132     IP VTE HIGH RISK PATIENT  Once         11/17/24 0132     Place sequential compression device  Until discontinued         11/17/24 0132                    Discharge Planning   YU: 11/20/2024     Code Status: Full Code   Is the patient medically ready for discharge?:     Reason for patient still in hospital (select all that apply): Pending disposition  Discharge Plan A: Home with family                  Puja Du MD  PGY-1  Department of Hospital Medicine   [facility] - Cardiology Stepdown

## 2024-11-19 NOTE — ASSESSMENT & PLAN NOTE
24M w/hx of NICM with EF 10-15% and morbid obesity who presents with acute HF exacerbation. Worsening symptoms and exacerbation likely in part due to some medication non-adherence, but also possibly due to the fact that his CHF medication regimen was not optimized with his cardiologist reporting that he was taking two beta blockers at the same time as well as lisinopril along with Entresto. On presentation, BNP 2400, troponin 0.166, and CXR with evidence of pulmonary congestion. Exam with peripheral edema and mild rales auscultated, consistent with acute CHF.    Plan:  - Continue diuresis with IV lasix 80mg; will adjust to TID   - If no effect, consider increasing dose or adding Diuril or metolazone   - Titrate to UOP, target 2-3L UOP and net 1-2L negative daily  - Resume GDMT with Toprol  BID, Farxiga 10mg qd and increasing Entresto to 49-51 BID  - Repeat Echo shows moderate pulmonary HTN with pressures of 51 mmHg  - Fluid restriction,1.5L and low Na diet  - Strict I/Os, daily weights and volume assessments; dry weight reported to be 340 lbs  - Monitor electrolytes with daily CMP, maintain Mg > 2 and K > 4.  - f/u with cardiology outpatient  - consult placed for life vest

## 2024-11-19 NOTE — PLAN OF CARE
Problem: Adult Inpatient Plan of Care  Goal: Absence of Hospital-Acquired Illness or Injury  Outcome: Progressing  Goal: Optimal Comfort and Wellbeing  Outcome: Progressing     Problem: Bariatric Environmental Safety  Goal: Safety Maintained with Care  Outcome: Progressing     Problem: Heart Failure  Goal: Optimal Coping  Outcome: Progressing  Goal: Optimal Cardiac Output  Outcome: Progressing

## 2024-11-19 NOTE — NURSING
Patient complaining of feeling like he's getting sick, says he can tell from the way his voice sounds and throat feels. Team notified. COVID w/ FLU and RSV test ordered and collected per KOBI Du MD.  No additional orders given at this time, plan of care ongoing.

## 2024-11-19 NOTE — PHARMACY MED REC
"Admission Medication History     The home medication history was taken by Deisy Hernandez.    You may go to "Admission" then "Reconcile Home Medications" tabs to review and/or act upon these items.     The home medication list has been updated by the Pharmacy department.   Please read ALL comments highlighted in yellow.   Please address this information as you see fit.    Feel free to contact us if you have any questions or require assistance.      The medications listed below were removed from the home medication list. Please reorder if appropriate:  Patient reports no longer taking the following medication(s):  BIDIL    Medications listed below were obtained from: Patient/family and Analytic software- [employer] [facility]   Medication Sig    amLODIPine (NORVASC) 5 MG tablet Take 5 mg by mouth once daily.    bumetanide (BUMEX) 2 MG tablet Take 2 mg by mouth once daily.    ENTRESTO 24-26 mg per tablet Take 1 tablet by mouth 2 (two) times daily.    FARXIGA 10 mg tablet Take 10 mg by mouth once daily.    furosemide (LASIX) 20 MG tablet Take 20 mg by mouth 2 (two) times daily.    hydrALAZINE (APRESOLINE) 25 MG tablet Take 25 mg by mouth once daily.    metoprolol succinate (TOPROL-XL) 100 MG 24 hr tablet Take 1 tablet (100 mg total) by mouth 2 (two) times daily.    potassium chloride (KLOR-CON) 10 MEQ TbSR Take 10 mEq by mouth once daily.    spironolactone (ALDACTONE) 50 MG tablet Take 50 mg by mouth once daily.    torsemide (DEMADEX) 20 MG Tab Take 20 mg by mouth 2 (two) times a day.    traZODone (DESYREL) 50 MG tablet Take 50 mg by mouth nightly as needed.    nitroGLYCERIN (NITROSTAT) 0.4 MG SL tablet Place 0.4 mg under the tongue every 5 (five) minutes as needed.  Patient has not taken yet.       Deisy Hernandez  EXT EPIC CHAT.    "

## 2024-11-20 LAB
ALBUMIN SERPL BCP-MCNC: 3.5 G/DL (ref 3.5–5.2)
ALP SERPL-CCNC: 89 U/L (ref 40–150)
ALT SERPL W/O P-5'-P-CCNC: 18 U/L (ref 10–44)
ANION GAP SERPL CALC-SCNC: 10 MMOL/L (ref 8–16)
AST SERPL-CCNC: 21 U/L (ref 10–40)
BASOPHILS # BLD AUTO: 0.13 K/UL (ref 0–0.2)
BASOPHILS NFR BLD: 1.1 % (ref 0–1.9)
BILIRUB SERPL-MCNC: 0.7 MG/DL (ref 0.1–1)
BUN SERPL-MCNC: 25 MG/DL (ref 6–20)
CALCIUM SERPL-MCNC: 9.4 MG/DL (ref 8.7–10.5)
CHLORIDE SERPL-SCNC: 104 MMOL/L (ref 95–110)
CO2 SERPL-SCNC: 24 MMOL/L (ref 23–29)
CREAT SERPL-MCNC: 1.5 MG/DL (ref 0.5–1.4)
DIFFERENTIAL METHOD BLD: ABNORMAL
EOSINOPHIL # BLD AUTO: 0.2 K/UL (ref 0–0.5)
EOSINOPHIL NFR BLD: 1.6 % (ref 0–8)
ERYTHROCYTE [DISTWIDTH] IN BLOOD BY AUTOMATED COUNT: 15.6 % (ref 11.5–14.5)
EST. GFR  (NO RACE VARIABLE): >60 ML/MIN/1.73 M^2
GLUCOSE SERPL-MCNC: 74 MG/DL (ref 70–110)
HCT VFR BLD AUTO: 51.7 % (ref 40–54)
HGB BLD-MCNC: 16.3 G/DL (ref 14–18)
IMM GRANULOCYTES # BLD AUTO: 0.04 K/UL (ref 0–0.04)
IMM GRANULOCYTES NFR BLD AUTO: 0.3 % (ref 0–0.5)
LYMPHOCYTES # BLD AUTO: 2 K/UL (ref 1–4.8)
LYMPHOCYTES NFR BLD: 17.1 % (ref 18–48)
MAGNESIUM SERPL-MCNC: 2.1 MG/DL (ref 1.6–2.6)
MCH RBC QN AUTO: 26.7 PG (ref 27–31)
MCHC RBC AUTO-ENTMCNC: 31.5 G/DL (ref 32–36)
MCV RBC AUTO: 85 FL (ref 82–98)
MONOCYTES # BLD AUTO: 1.2 K/UL (ref 0.3–1)
MONOCYTES NFR BLD: 10.1 % (ref 4–15)
NEUTROPHILS # BLD AUTO: 8.1 K/UL (ref 1.8–7.7)
NEUTROPHILS NFR BLD: 69.8 % (ref 38–73)
NRBC BLD-RTO: 0 /100 WBC
PHOSPHATE SERPL-MCNC: 4.1 MG/DL (ref 2.7–4.5)
PLATELET # BLD AUTO: 235 K/UL (ref 150–450)
PMV BLD AUTO: 10.8 FL (ref 9.2–12.9)
POTASSIUM SERPL-SCNC: 3.8 MMOL/L (ref 3.5–5.1)
PROT SERPL-MCNC: 7.3 G/DL (ref 6–8.4)
RBC # BLD AUTO: 6.1 M/UL (ref 4.6–6.2)
SODIUM SERPL-SCNC: 138 MMOL/L (ref 136–145)
WBC # BLD AUTO: 11.55 K/UL (ref 3.9–12.7)

## 2024-11-20 PROCEDURE — 85025 COMPLETE CBC W/AUTO DIFF WBC: CPT

## 2024-11-20 PROCEDURE — 83735 ASSAY OF MAGNESIUM: CPT

## 2024-11-20 PROCEDURE — 63600175 PHARM REV CODE 636 W HCPCS

## 2024-11-20 PROCEDURE — 36415 COLL VENOUS BLD VENIPUNCTURE: CPT

## 2024-11-20 PROCEDURE — 84100 ASSAY OF PHOSPHORUS: CPT

## 2024-11-20 PROCEDURE — 20600001 HC STEP DOWN PRIVATE ROOM

## 2024-11-20 PROCEDURE — 80053 COMPREHEN METABOLIC PANEL: CPT

## 2024-11-20 PROCEDURE — 25000003 PHARM REV CODE 250

## 2024-11-20 PROCEDURE — 25000003 PHARM REV CODE 250: Performed by: HOSPITALIST

## 2024-11-20 RX ORDER — BENZONATATE 100 MG/1
100 CAPSULE ORAL 3 TIMES DAILY PRN
Status: DISCONTINUED | OUTPATIENT
Start: 2024-11-20 | End: 2024-11-21

## 2024-11-20 RX ORDER — POTASSIUM CHLORIDE 20 MEQ/1
40 TABLET, EXTENDED RELEASE ORAL 2 TIMES DAILY
Status: DISCONTINUED | OUTPATIENT
Start: 2024-11-20 | End: 2024-11-21 | Stop reason: HOSPADM

## 2024-11-20 RX ADMIN — ENOXAPARIN SODIUM 40 MG: 40 INJECTION SUBCUTANEOUS at 08:11

## 2024-11-20 RX ADMIN — SACUBITRIL AND VALSARTAN 1 TABLET: 49; 51 TABLET, FILM COATED ORAL at 08:11

## 2024-11-20 RX ADMIN — POTASSIUM CHLORIDE 40 MEQ: 1500 TABLET, EXTENDED RELEASE ORAL at 09:11

## 2024-11-20 RX ADMIN — FUROSEMIDE 80 MG: 10 INJECTION, SOLUTION INTRAVENOUS at 03:11

## 2024-11-20 RX ADMIN — SACUBITRIL AND VALSARTAN 1 TABLET: 49; 51 TABLET, FILM COATED ORAL at 09:11

## 2024-11-20 RX ADMIN — DAPAGLIFLOZIN 10 MG: 10 TABLET, FILM COATED ORAL at 08:11

## 2024-11-20 RX ADMIN — FUROSEMIDE 80 MG: 10 INJECTION, SOLUTION INTRAVENOUS at 09:11

## 2024-11-20 RX ADMIN — METOPROLOL SUCCINATE 100 MG: 100 TABLET, EXTENDED RELEASE ORAL at 08:11

## 2024-11-20 RX ADMIN — FUROSEMIDE 80 MG: 10 INJECTION, SOLUTION INTRAVENOUS at 08:11

## 2024-11-20 RX ADMIN — ISOSORBIDE DINITRATE: 20 TABLET ORAL at 09:11

## 2024-11-20 RX ADMIN — ISOSORBIDE DINITRATE: 20 TABLET ORAL at 04:11

## 2024-11-20 RX ADMIN — GUAIFENESIN 600 MG: 600 TABLET, EXTENDED RELEASE ORAL at 09:11

## 2024-11-20 RX ADMIN — BENZONATATE 100 MG: 100 CAPSULE ORAL at 04:11

## 2024-11-20 RX ADMIN — METOPROLOL SUCCINATE 100 MG: 100 TABLET, EXTENDED RELEASE ORAL at 09:11

## 2024-11-20 RX ADMIN — ISOSORBIDE DINITRATE: 20 TABLET ORAL at 08:11

## 2024-11-20 RX ADMIN — GUAIFENESIN 600 MG: 600 TABLET, EXTENDED RELEASE ORAL at 10:11

## 2024-11-20 RX ADMIN — ENOXAPARIN SODIUM 40 MG: 40 INJECTION SUBCUTANEOUS at 09:11

## 2024-11-20 RX ADMIN — BENZONATATE 100 MG: 100 CAPSULE ORAL at 10:11

## 2024-11-20 NOTE — ASSESSMENT & PLAN NOTE
Patients blood pressure range in the last 24 hours was: BP  Min: 112/73  Max: 157/70.The patient's inpatient anti-hypertensive regimen is listed below:  Current Antihypertensives  metoprolol succinate (TOPROL-XL) 24 hr tablet 100 mg, 2 times daily, Oral  furosemide injection 80 mg, 3 times daily, Intravenous  hydrALAZINE 10 mg 1 tablet, hydrALAZINE 25 mg 1 tablet, isorsorbide dinitrate 20 mg 1 tablet combination, 3 times daily, Oral  , 2 times daily, Oral  , Daily, Oral  , 2 times daily, Oral    Plan  - BP is uncontrolled, will adjust as follows: PRN IV hydralazine ordered given BP persistently > 190 in the ED  - Adjust regimen as needed to optimize BP control  - Continue Bidil 20/37.5mg

## 2024-11-20 NOTE — SUBJECTIVE & OBJECTIVE
Interval History: NAEON. AAO x3. Patient is looking better and appears to be feeling better. Denies any HA, chest pain or pressure, hemoptysis, abdominal pain, or weakness. SOB has improved, but he admits to WILLIS. UOP of 4.4L      Review of Systems   Constitutional: Negative.  Negative for chills, diaphoresis, fatigue and fever.   HENT: Negative.  Negative for congestion, ear pain, hearing loss, rhinorrhea and sore throat.    Eyes: Negative.    Respiratory:  Positive for shortness of breath. Negative for cough and chest tightness.    Cardiovascular: Negative.  Negative for chest pain and palpitations.   Gastrointestinal: Negative.  Negative for abdominal distention, abdominal pain, blood in stool, constipation, diarrhea, nausea and vomiting.   Genitourinary: Negative.  Negative for dysuria, frequency and hematuria.   Musculoskeletal: Negative.  Negative for back pain, joint swelling and neck pain.   Skin: Negative.    Neurological:  Negative for dizziness, seizures, syncope, weakness, light-headedness and headaches.   Psychiatric/Behavioral: Negative.       Objective:     Vital Signs (Most Recent):  Temp: 98.6 °F (37 °C) (11/20/24 0733)  Pulse: 101 (11/20/24 0733)  Resp: 20 (11/20/24 0733)  BP: (!) 155/87 (11/20/24 0733)  SpO2: 96 % (11/20/24 0733) Vital Signs (24h Range):  Temp:  [97.8 °F (36.6 °C)-98.6 °F (37 °C)] 98.6 °F (37 °C)  Pulse:  [] 101  Resp:  [18-21] 20  SpO2:  [96 %-100 %] 96 %  BP: (112-157)/(58-87) 155/87     Weight: (!) 156.8 kg (345 lb 10.9 oz)  Body mass index is 49.6 kg/m².    Intake/Output Summary (Last 24 hours) at 11/20/2024 0967  Last data filed at 11/20/2024 0911  Gross per 24 hour   Intake 1266 ml   Output 3875 ml   Net -2609 ml         Physical Exam  Constitutional:       General: He is awake. He is not in acute distress.     Appearance: Normal appearance. He is not ill-appearing.   HENT:      Head: Normocephalic.      Nose: Congestion and rhinorrhea present.   Eyes:      Extraocular  Movements: Extraocular movements intact.   Cardiovascular:      Rate and Rhythm: Normal rate and regular rhythm.      Pulses: Normal pulses.      Heart sounds: Normal heart sounds. No murmur heard.     No friction rub. No gallop.   Pulmonary:      Effort: Pulmonary effort is normal. No respiratory distress.      Breath sounds: Normal breath sounds. No wheezing or rales.   Abdominal:      General: Bowel sounds are normal. There is no distension.      Palpations: Abdomen is soft.      Tenderness: There is no abdominal tenderness. There is no guarding.   Musculoskeletal:         General: Swelling present.      Cervical back: Normal range of motion. No rigidity or tenderness.      Right lower leg: Edema present.      Left lower leg: Edema present.   Skin:     General: Skin is warm.   Neurological:      General: No focal deficit present.      Mental Status: He is alert and oriented to person, place, and time.   Psychiatric:         Mood and Affect: Mood normal.         Behavior: Behavior normal.             Significant Labs: All pertinent labs within the past 24 hours have been reviewed.  CBC:   Recent Labs   Lab 11/19/24  0339 11/20/24  0657   WBC 10.79 11.55   HGB 14.3 16.3   HCT 45.8 51.7    235     CMP:   Recent Labs   Lab 11/19/24  0339 11/19/24  1449 11/20/24  0657    139 138   K 3.5 3.8 3.8    105 104   CO2 22* 24 24   GLU 80 91 74   BUN 25* 26* 25*   CREATININE 1.6* 1.6* 1.5*   CALCIUM 9.8 9.5 9.4   PROT 6.8  --  7.3   ALBUMIN 3.2*  --  3.5   BILITOT 0.5  --  0.7   ALKPHOS 91  --  89   AST 19  --  21   ALT 18  --  18   ANIONGAP 10 10 10     Magnesium:   Recent Labs   Lab 11/19/24  0339 11/20/24  0657   MG 2.1 2.1       Significant Imaging: I have reviewed all pertinent imaging results/findings within the past 24 hours.

## 2024-11-20 NOTE — NURSING
Entered room to find patient with 2 bags of barbecue frito chips(each package contains 400 mg of sodium each. Patient informed the chips does not comply with his diet, cardiac(low sodium). Education provided on the importance of following diet and how foods high in sodium/ salt effects his fluid status. KOBI Du MD notified of patients non-compliance. No orders given at this time, plan of care ongoing.

## 2024-11-20 NOTE — ASSESSMENT & PLAN NOTE
24M w/hx of NICM with EF 10-15% and morbid obesity who presents with acute HF exacerbation. Worsening symptoms and exacerbation likely in part due to some medication non-adherence, but also possibly due to the fact that his CHF medication regimen was not optimized with his cardiologist reporting that he was taking two beta blockers at the same time as well as lisinopril along with Entresto. On presentation, BNP 2400, troponin 0.166, and CXR with evidence of pulmonary congestion. Exam with peripheral edema and mild rales auscultated, consistent with acute CHF.    Plan:  - Continue diuresis with IV lasix 80mg; will adjust to TID   - If no effect, consider increasing dose or adding Diuril or metolazone   - Titrate to UOP, target 2-3L UOP and net 1-2L negative daily  - Continue GDMT with Toprol  BID, Farxiga 10mg qd and increasing Entresto to 49-51 BID  - Repeat Echo shows moderate pulmonary HTN with pressures of 51 mmHg  - Fluid restriction,1.5L and low Na diet  - Strict I/Os, daily weights and volume assessments; dry weight reported to be 340 lbs  - Monitor electrolytes with daily CMP, maintain Mg > 2 and K > 4.  - f/u with cardiology outpatient  - consult placed for life vest

## 2024-11-20 NOTE — ASSESSMENT & PLAN NOTE
Diagnosed in 2020. Aunt reports that patient has been in the hospital in [place] many times, and that there was a period he was hospitalized almost every other week for HF exacerbation. Unknown etiology. Established with [facility] in July 2024 and was deemed not to be a transplant candidate due to BMI > 35.    - See acute on chronic HF

## 2024-11-20 NOTE — PROGRESS NOTES
"[facility] - Cardiology [facility] Medicine  Progress Note    Patient Name: Michael Conti  MRN: [id number]  Patient Class: IP- Inpatient   Admission Date: 11/16/2024  Length of Stay: 3 days  Attending Physician: Yudelka Moyer MD  Primary Care Provider: Lele Verde NP (Inactive)        Subjective:     Principal Problem:Acute on chronic combined systolic and diastolic heart failure        HPI:  Michael Conti is a 24 year old man with non-ischemic cardiomyopathy with EF 10-15%, HTN, and obesity BMI of 55, who presents on 11/16 with worsening shortness of breath and fluid retention for the last  Patient reports these symptoms have been getting worse over the last few weeks. He was seen in Heart Transplant clinic on 11/15 and reported this to his cardiologist and was instructed to present to the hospital for admission for decompensated CHF, however, patient wanted to gather his belongings from home first prior to presenting. Patient reports that his symptoms are consistent with past HF exacerbations and he and his aunt at bedside report that the patient has been hospitalized in [place], where they live, innumerable times in the recent past. Patient denies any other symptoms apart from his SOB and edema; denies fever, chills, rhinorrhea, congestion, n/v/d. He endorses a non productive cough, but feels like there is phlegm he is unable to expectorate. He reports to me that for the last 3-4 days, he has also stopped taking some of his heart and fluid medications (Entresto and Bumex) due to misunderstanding his cardiologist regarding his Bumex; however he also admits that he stopped because he had "given up". It appears he had been on several medications at the same time that may not have been optimal as well, per his cardiologist's note. Regarding diet, he reports he has been trying to adhere closely to fluid restriction and has been generally trying to eat healthy, including baked chicken with sodium " substitutes like Mrs. Dash. He notes that his dry weight is 340 lbs, and he is currently at 357 lbs.    In the ED, patient afebrile and significantly hypertensive, up to 193/81. Labs notable for BNP 2400, troponin 0.166, K 3.0, and Cr 1.6. CXR with pulmonary vascular congestion. Patient given 1 dose of IV lasix 80 and admitted to Hospital Medicine for acute HF exacerbation.    Overview/Hospital Course:  Patient admitted for SOB 2/2 CHF exacerbation. BNP of 2,396, prior was 4,561 on 11/15. Given furosemide 80mg IV. Patient fell overnight. Camera installed in room. Starting Bidil and attempting to get patient back on a Life Vest.    Interval History: NAEON. AAO x3. Patient is looking better and appears to be feeling better. Denies any HA, chest pain or pressure, hemoptysis, abdominal pain, or weakness. SOB has improved, but he admits to WILLIS. UOP of 4.4L      Review of Systems   Constitutional: Negative.  Negative for chills, diaphoresis, fatigue and fever.   HENT: Negative.  Negative for congestion, ear pain, hearing loss, rhinorrhea and sore throat.    Eyes: Negative.    Respiratory:  Positive for shortness of breath. Negative for cough and chest tightness.    Cardiovascular: Negative.  Negative for chest pain and palpitations.   Gastrointestinal: Negative.  Negative for abdominal distention, abdominal pain, blood in stool, constipation, diarrhea, nausea and vomiting.   Genitourinary: Negative.  Negative for dysuria, frequency and hematuria.   Musculoskeletal: Negative.  Negative for back pain, joint swelling and neck pain.   Skin: Negative.    Neurological:  Negative for dizziness, seizures, syncope, weakness, light-headedness and headaches.   Psychiatric/Behavioral: Negative.       Objective:     Vital Signs (Most Recent):  Temp: 98.6 °F (37 °C) (11/20/24 0733)  Pulse: 101 (11/20/24 0733)  Resp: 20 (11/20/24 0733)  BP: (!) 155/87 (11/20/24 0733)  SpO2: 96 % (11/20/24 0733) Vital Signs (24h Range):  Temp:  [97.8 °F  (36.6 °C)-98.6 °F (37 °C)] 98.6 °F (37 °C)  Pulse:  [] 101  Resp:  [18-21] 20  SpO2:  [96 %-100 %] 96 %  BP: (112-157)/(58-87) 155/87     Weight: (!) 156.8 kg (345 lb 10.9 oz)  Body mass index is 49.6 kg/m².    Intake/Output Summary (Last 24 hours) at 11/20/2024 0925  Last data filed at 11/20/2024 0911  Gross per 24 hour   Intake 1266 ml   Output 3875 ml   Net -2609 ml         Physical Exam  Constitutional:       General: He is awake. He is not in acute distress.     Appearance: Normal appearance. He is not ill-appearing.   HENT:      Head: Normocephalic.      Nose: Congestion and rhinorrhea present.   Eyes:      Extraocular Movements: Extraocular movements intact.   Cardiovascular:      Rate and Rhythm: Normal rate and regular rhythm.      Pulses: Normal pulses.      Heart sounds: Normal heart sounds. No murmur heard.     No friction rub. No gallop.   Pulmonary:      Effort: Pulmonary effort is normal. No respiratory distress.      Breath sounds: Normal breath sounds. No wheezing or rales.   Abdominal:      General: Bowel sounds are normal. There is no distension.      Palpations: Abdomen is soft.      Tenderness: There is no abdominal tenderness. There is no guarding.   Musculoskeletal:         General: Swelling present.      Cervical back: Normal range of motion. No rigidity or tenderness.      Right lower leg: Edema present.      Left lower leg: Edema present.   Skin:     General: Skin is warm.   Neurological:      General: No focal deficit present.      Mental Status: He is alert and oriented to person, place, and time.   Psychiatric:         Mood and Affect: Mood normal.         Behavior: Behavior normal.             Significant Labs: All pertinent labs within the past 24 hours have been reviewed.  CBC:   Recent Labs   Lab 11/19/24  0339 11/20/24  0657   WBC 10.79 11.55   HGB 14.3 16.3   HCT 45.8 51.7    235     CMP:   Recent Labs   Lab 11/19/24  0339 11/19/24  1449 11/20/24  0657    139 138    K 3.5 3.8 3.8    105 104   CO2 22* 24 24   GLU 80 91 74   BUN 25* 26* 25*   CREATININE 1.6* 1.6* 1.5*   CALCIUM 9.8 9.5 9.4   PROT 6.8  --  7.3   ALBUMIN 3.2*  --  3.5   BILITOT 0.5  --  0.7   ALKPHOS 91  --  89   AST 19  --  21   ALT 18  --  18   ANIONGAP 10 10 10     Magnesium:   Recent Labs   Lab 11/19/24  0339 11/20/24  0657   MG 2.1 2.1       Significant Imaging: I have reviewed all pertinent imaging results/findings within the past 24 hours.    Assessment/Plan:      * Acute on chronic combined systolic and diastolic heart failure  24M w/hx of NICM with EF 10-15% and morbid obesity who presents with acute HF exacerbation. Worsening symptoms and exacerbation likely in part due to some medication non-adherence, but also possibly due to the fact that his CHF medication regimen was not optimized with his cardiologist reporting that he was taking two beta blockers at the same time as well as lisinopril along with Entresto. On presentation, BNP 2400, troponin 0.166, and CXR with evidence of pulmonary congestion. Exam with peripheral edema and mild rales auscultated, consistent with acute CHF.    Plan:  - Continue diuresis with IV lasix 80mg; will adjust to TID   - If no effect, consider increasing dose or adding Diuril or metolazone   - Titrate to UOP, target 2-3L UOP and net 1-2L negative daily  - Continue GDMT with Toprol  BID, Farxiga 10mg qd and increasing Entresto to 49-51 BID  - Repeat Echo shows moderate pulmonary HTN with pressures of 51 mmHg  - Fluid restriction,1.5L and low Na diet  - Strict I/Os, daily weights and volume assessments; dry weight reported to be 340 lbs  - Monitor electrolytes with daily CMP, maintain Mg > 2 and K > 4.  - f/u with cardiology outpatient  - consult placed for life vest    Primary hypertension  Patients blood pressure range in the last 24 hours was: BP  Min: 112/73  Max: 157/70.The patient's inpatient anti-hypertensive regimen is listed below:  Current  Antihypertensives  metoprolol succinate (TOPROL-XL) 24 hr tablet 100 mg, 2 times daily, Oral  furosemide injection 80 mg, 3 times daily, Intravenous  hydrALAZINE 10 mg 1 tablet, hydrALAZINE 25 mg 1 tablet, isorsorbide dinitrate 20 mg 1 tablet combination, 3 times daily, Oral  , 2 times daily, Oral  , Daily, Oral  , 2 times daily, Oral    Plan  - BP is uncontrolled, will adjust as follows: PRN IV hydralazine ordered given BP persistently > 190 in the ED  - Adjust regimen as needed to optimize BP control  - Continue Bidil 20/37.5mg    Non-ischemic cardiomyopathy  Diagnosed in 2020. Aunt reports that patient has been in the hospital in [place] many times, and that there was a period he was hospitalized almost every other week for HF exacerbation. Unknown etiology. Established with [facility] cardiology in July 2024 and was deemed not to be a transplant candidate due to BMI > 35.    - See acute on chronic HF      Severe obesity (BMI >= 40)  Body mass index is 49.6 kg/m². Morbid obesity complicates all aspects of disease management from diagnostic modalities to treatment. Weight loss encouraged and health benefits explained to patient.           VTE Risk Mitigation (From admission, onward)           Ordered     enoxaparin injection 40 mg  Every 12 hours         11/17/24 0132     IP VTE HIGH RISK PATIENT  Once         11/17/24 0132     Place sequential compression device  Until discontinued         11/17/24 0132                    Discharge Planning   YU: 11/21/2024     Code Status: Full Code   Is the patient medically ready for discharge?:     Reason for patient still in hospital (select all that apply): Pending disposition  Discharge Plan A: Home with family                  Puja Du MD  PGY-1  Department of Hospital Medicine   [facility] - [facility]

## 2024-11-21 VITALS
RESPIRATION RATE: 19 BRPM | OXYGEN SATURATION: 98 % | HEIGHT: 70 IN | BODY MASS INDEX: 45.1 KG/M2 | HEART RATE: 97 BPM | DIASTOLIC BLOOD PRESSURE: 60 MMHG | TEMPERATURE: 98 F | WEIGHT: 315 LBS | SYSTOLIC BLOOD PRESSURE: 123 MMHG

## 2024-11-21 LAB
ALBUMIN SERPL BCP-MCNC: 3.3 G/DL (ref 3.5–5.2)
ALP SERPL-CCNC: 87 U/L (ref 40–150)
ALT SERPL W/O P-5'-P-CCNC: 18 U/L (ref 10–44)
ANION GAP SERPL CALC-SCNC: 11 MMOL/L (ref 8–16)
AST SERPL-CCNC: 24 U/L (ref 10–40)
BASOPHILS # BLD AUTO: 0.05 K/UL (ref 0–0.2)
BASOPHILS NFR BLD: 0.6 % (ref 0–1.9)
BILIRUB SERPL-MCNC: 0.5 MG/DL (ref 0.1–1)
BUN SERPL-MCNC: 27 MG/DL (ref 6–20)
CALCIUM SERPL-MCNC: 9.6 MG/DL (ref 8.7–10.5)
CHLORIDE SERPL-SCNC: 105 MMOL/L (ref 95–110)
CO2 SERPL-SCNC: 22 MMOL/L (ref 23–29)
CREAT SERPL-MCNC: 1.4 MG/DL (ref 0.5–1.4)
DIFFERENTIAL METHOD BLD: ABNORMAL
EOSINOPHIL # BLD AUTO: 0.2 K/UL (ref 0–0.5)
EOSINOPHIL NFR BLD: 2.6 % (ref 0–8)
ERYTHROCYTE [DISTWIDTH] IN BLOOD BY AUTOMATED COUNT: 14.7 % (ref 11.5–14.5)
EST. GFR  (NO RACE VARIABLE): >60 ML/MIN/1.73 M^2
GLUCOSE SERPL-MCNC: 81 MG/DL (ref 70–110)
HCT VFR BLD AUTO: 48.3 % (ref 40–54)
HGB BLD-MCNC: 15.4 G/DL (ref 14–18)
IMM GRANULOCYTES # BLD AUTO: 0.03 K/UL (ref 0–0.04)
IMM GRANULOCYTES NFR BLD AUTO: 0.3 % (ref 0–0.5)
LYMPHOCYTES # BLD AUTO: 1.6 K/UL (ref 1–4.8)
LYMPHOCYTES NFR BLD: 18.3 % (ref 18–48)
MAGNESIUM SERPL-MCNC: 2.3 MG/DL (ref 1.6–2.6)
MCH RBC QN AUTO: 26.4 PG (ref 27–31)
MCHC RBC AUTO-ENTMCNC: 31.9 G/DL (ref 32–36)
MCV RBC AUTO: 83 FL (ref 82–98)
MONOCYTES # BLD AUTO: 1 K/UL (ref 0.3–1)
MONOCYTES NFR BLD: 10.7 % (ref 4–15)
NEUTROPHILS # BLD AUTO: 6 K/UL (ref 1.8–7.7)
NEUTROPHILS NFR BLD: 67.5 % (ref 38–73)
NRBC BLD-RTO: 0 /100 WBC
PHOSPHATE SERPL-MCNC: 4.2 MG/DL (ref 2.7–4.5)
PLATELET # BLD AUTO: 287 K/UL (ref 150–450)
PMV BLD AUTO: 10 FL (ref 9.2–12.9)
POTASSIUM SERPL-SCNC: 4.1 MMOL/L (ref 3.5–5.1)
PROT SERPL-MCNC: 7.1 G/DL (ref 6–8.4)
RBC # BLD AUTO: 5.84 M/UL (ref 4.6–6.2)
SODIUM SERPL-SCNC: 138 MMOL/L (ref 136–145)
WBC # BLD AUTO: 8.87 K/UL (ref 3.9–12.7)

## 2024-11-21 PROCEDURE — 83735 ASSAY OF MAGNESIUM: CPT

## 2024-11-21 PROCEDURE — 63600175 PHARM REV CODE 636 W HCPCS

## 2024-11-21 PROCEDURE — 25000003 PHARM REV CODE 250: Performed by: HOSPITALIST

## 2024-11-21 PROCEDURE — 84100 ASSAY OF PHOSPHORUS: CPT

## 2024-11-21 PROCEDURE — 36415 COLL VENOUS BLD VENIPUNCTURE: CPT

## 2024-11-21 PROCEDURE — 80053 COMPREHEN METABOLIC PANEL: CPT

## 2024-11-21 PROCEDURE — 25000003 PHARM REV CODE 250

## 2024-11-21 PROCEDURE — 85025 COMPLETE CBC W/AUTO DIFF WBC: CPT

## 2024-11-21 RX ORDER — POTASSIUM CHLORIDE 750 MG/1
10 TABLET, EXTENDED RELEASE ORAL DAILY
Qty: 90 TABLET | Refills: 2 | Status: SHIPPED | OUTPATIENT
Start: 2024-11-21

## 2024-11-21 RX ORDER — TORSEMIDE 20 MG/1
40 TABLET ORAL 2 TIMES DAILY
Qty: 180 TABLET | Refills: 2 | Status: SHIPPED | OUTPATIENT
Start: 2024-11-21

## 2024-11-21 RX ORDER — METOPROLOL SUCCINATE 100 MG/1
100 TABLET, EXTENDED RELEASE ORAL 2 TIMES DAILY
Qty: 180 TABLET | Refills: 2 | Status: SHIPPED | OUTPATIENT
Start: 2024-11-21 | End: 2025-08-18

## 2024-11-21 RX ORDER — SPIRONOLACTONE 25 MG/1
25 TABLET ORAL DAILY
Qty: 90 TABLET | Refills: 2 | Status: SHIPPED | OUTPATIENT
Start: 2024-11-21

## 2024-11-21 RX ORDER — ISOSORBIDE DINITRATE AND HYDRALAZINE HYDROCHLORIDE 37.5; 2 MG/1; MG/1
1 TABLET ORAL 3 TIMES DAILY
Qty: 270 TABLET | Refills: 2 | Status: SHIPPED | OUTPATIENT
Start: 2024-11-21 | End: 2025-08-18

## 2024-11-21 RX ORDER — GUAIFENESIN 100 MG/5ML
200 SOLUTION ORAL EVERY 4 HOURS PRN
Status: DISCONTINUED | OUTPATIENT
Start: 2024-11-21 | End: 2024-11-21 | Stop reason: HOSPADM

## 2024-11-21 RX ORDER — DAPAGLIFLOZIN 10 MG/1
10 TABLET, FILM COATED ORAL DAILY
Qty: 90 TABLET | Refills: 2 | Status: SHIPPED | OUTPATIENT
Start: 2024-11-21

## 2024-11-21 RX ADMIN — DAPAGLIFLOZIN 10 MG: 10 TABLET, FILM COATED ORAL at 09:11

## 2024-11-21 RX ADMIN — GUAIFENESIN 200 MG: 200 SOLUTION ORAL at 09:11

## 2024-11-21 RX ADMIN — ISOSORBIDE DINITRATE: 20 TABLET ORAL at 09:11

## 2024-11-21 RX ADMIN — POTASSIUM CHLORIDE 40 MEQ: 1500 TABLET, EXTENDED RELEASE ORAL at 09:11

## 2024-11-21 RX ADMIN — ISOSORBIDE DINITRATE: 20 TABLET ORAL at 02:11

## 2024-11-21 RX ADMIN — METOPROLOL SUCCINATE 100 MG: 100 TABLET, EXTENDED RELEASE ORAL at 09:11

## 2024-11-21 RX ADMIN — ENOXAPARIN SODIUM 40 MG: 40 INJECTION SUBCUTANEOUS at 09:11

## 2024-11-21 RX ADMIN — FUROSEMIDE 80 MG: 10 INJECTION, SOLUTION INTRAVENOUS at 09:11

## 2024-11-21 RX ADMIN — SACUBITRIL AND VALSARTAN 1 TABLET: 49; 51 TABLET, FILM COATED ORAL at 09:11

## 2024-11-21 RX ADMIN — FUROSEMIDE 80 MG: 10 INJECTION, SOLUTION INTRAVENOUS at 02:11

## 2024-11-21 NOTE — SUBJECTIVE & OBJECTIVE
Interval History: NAEON. AAO x3. Patient complains of a cough. Says that his SOB continues to improve. Denies any HA, chest pain or pressure, hemoptysis, abdominal pain, or weakness.      Review of Systems   Constitutional: Negative.  Negative for chills, diaphoresis, fatigue and fever.   HENT: Negative.  Negative for congestion, ear pain, hearing loss, rhinorrhea and sore throat.    Eyes: Negative.    Respiratory:  Positive for shortness of breath. Negative for cough and chest tightness.    Cardiovascular: Negative.  Negative for chest pain and palpitations.   Gastrointestinal: Negative.  Negative for abdominal distention, abdominal pain, blood in stool, constipation, diarrhea, nausea and vomiting.   Genitourinary: Negative.  Negative for dysuria, frequency and hematuria.   Musculoskeletal: Negative.  Negative for back pain, joint swelling and neck pain.   Skin: Negative.    Neurological:  Negative for dizziness, seizures, syncope, weakness, light-headedness and headaches.   Psychiatric/Behavioral: Negative.       Objective:     Vital Signs (Most Recent):  Temp: 97.9 °F (36.6 °C) (11/21/24 0725)  Pulse: 99 (11/21/24 0725)  Resp: 19 (11/21/24 0725)  BP: 122/66 (11/21/24 0725)  SpO2: 97 % (11/21/24 0725) Vital Signs (24h Range):  Temp:  [97.2 °F (36.2 °C)-98.8 °F (37.1 °C)] 97.9 °F (36.6 °C)  Pulse:  [] 99  Resp:  [16-19] 19  SpO2:  [95 %-99 %] 97 %  BP: (107-126)/(53-75) 122/66     Weight: (!) 157.3 kg (346 lb 12.5 oz)  Body mass index is 49.76 kg/m².    Intake/Output Summary (Last 24 hours) at 11/21/2024 0827  Last data filed at 11/21/2024 0000  Gross per 24 hour   Intake 1182 ml   Output 3200 ml   Net -2018 ml         Physical Exam  Constitutional:       General: He is awake. He is not in acute distress.     Appearance: Normal appearance. He is not ill-appearing.   HENT:      Head: Normocephalic.      Nose: Congestion and rhinorrhea present.   Eyes:      Extraocular Movements: Extraocular movements intact.    Cardiovascular:      Rate and Rhythm: Normal rate and regular rhythm.      Pulses: Normal pulses.      Heart sounds: Normal heart sounds. No murmur heard.     No friction rub. No gallop.   Pulmonary:      Effort: Pulmonary effort is normal. No respiratory distress.      Breath sounds: Normal breath sounds. No wheezing or rales.   Abdominal:      General: Bowel sounds are normal. There is no distension.      Palpations: Abdomen is soft.      Tenderness: There is no abdominal tenderness. There is no guarding.   Musculoskeletal:         General: Swelling present.      Cervical back: Normal range of motion. No rigidity or tenderness.      Right lower leg: Edema present.      Left lower leg: Edema present.   Skin:     General: Skin is warm.   Neurological:      General: No focal deficit present.      Mental Status: He is alert and oriented to person, place, and time.   Psychiatric:         Mood and Affect: Mood normal.         Behavior: Behavior normal.             Significant Labs: All pertinent labs within the past 24 hours have been reviewed.  CBC:   Recent Labs   Lab 11/20/24  0657 11/21/24  0535   WBC 11.55 8.87   HGB 16.3 15.4   HCT 51.7 48.3    287     CMP:   Recent Labs   Lab 11/19/24  1449 11/20/24  0657 11/21/24  0535    138 138   K 3.8 3.8 4.1    104 105   CO2 24 24 22*   GLU 91 74 81   BUN 26* 25* 27*   CREATININE 1.6* 1.5* 1.4   CALCIUM 9.5 9.4 9.6   PROT  --  7.3 7.1   ALBUMIN  --  3.5 3.3*   BILITOT  --  0.7 0.5   ALKPHOS  --  89 87   AST  --  21 24   ALT  --  18 18   ANIONGAP 10 10 11       Significant Imaging: I have reviewed all pertinent imaging results/findings within the past 24 hours.

## 2024-11-21 NOTE — PLAN OF CARE
[facility] - Cardiology Stepdown  Discharge Final Note    Primary Care Provider: Lele Verde NP (Inactive)    Expected Discharge Date: 11/21/2024    Final Discharge Note (most recent)       Final Note - 11/21/24 1640          Final Note    Assessment Type Final Discharge Note     Anticipated Discharge Disposition Home or Self Care                 SW informed that pt does not have a ride home.  SW asked pt if he spoke with his family about a ride and pt reported that his family works until 10:00pm.  Per CHW eduardo she was unable to schedule Medicaid transport because it would have to be authorized by the insurance first because of the distance.  Transportation home schedule via [employer].  Per charge RN Armida Simental who escorted pt down, pt is following the  on his phone.      UPDATE 5:04 PM  SW saw via [employer]  that pt's  was changed multiple times and then cancelled by rider.  JUDE relayed this to ED  Heena, stating that pt has been discharged from his room and therefore might need to come to the ED if he needs a ride.  Heena acknowledged and stated that she would let their charge RN know as well.        Hellen Vicente LMSW  [facility]  [id number]          Contact Info       Gee Soto MD   Specialty: Cardiology    [address]  [address] [place] [id number]   Phone: [phone]       Next Steps: Follow up on 11/25/2024    Instructions: [facility] f/u at 3:10 pm. Please bring discharge summary, ID, Insurance card, and medication list.

## 2024-11-21 NOTE — PLAN OF CARE
AAOX4,VSS,O2 sats 99% on room air.Patient, a fall risk, fall precautions in place.Remains free of falls/ injuries.Plan of care discussed with patient, patient being discharged today.Patient has no complaints of chest pain/SOB, palpitations, dizziness, lightheadedness, or n/v. Frequent morrell, non productive coughing noted. Life vest delivered to bedside and applied before patient's discharge. Discussed medications and care.Education provided to patient on the importance of medication compliance, fluid restriction, and following a low sodium diet in order to manage HF as well as prevent future admissions. Follow-up instructions also provided on proper use of an incentive spirometer Patient has no questions at this time.Pt visualised and stable, with no acute distress noted.Call light within reach.Pt resting,bed at lowest position.No family by the bedside.Will continue to monitor through the shift.       Problem: Wound  Goal: Improved Oral Intake  Outcome: Progressing     Problem: Heart Failure  Goal: Optimal Cardiac Output  Outcome: Not Progressing  Goal: Stable Heart Rate and Rhythm  Outcome: Progressing  Goal: Fluid and Electrolyte Balance  Outcome: Progressing  Goal: Effective Oxygenation and Ventilation  Outcome: Met  Goal: Effective Breathing Pattern During Sleep  Outcome: Met

## 2024-11-21 NOTE — PLAN OF CARE
CHW placed a call to [facility] [phone] to schedule transportation home.  Unable to schedule transportation due to trip needing prior authorization based on the mileage being over 60 miles. SW notified.            Kamille Meyers, LUCIW, RSW, BSW  Case Management  [id number]

## 2024-11-21 NOTE — ASSESSMENT & PLAN NOTE
24M w/hx of NICM with EF 10-15% and morbid obesity who presents with acute HF exacerbation. Worsening symptoms and exacerbation likely in part due to some medication non-adherence, but also possibly due to the fact that his CHF medication regimen was not optimized with his cardiologist reporting that he was taking two beta blockers at the same time as well as lisinopril along with Entresto. On presentation, BNP 2400, troponin 0.166, and CXR with evidence of pulmonary congestion. Exam with peripheral edema and mild rales auscultated, consistent with acute CHF.    Plan:  - Continue diuresis with IV lasix 80mg; will adjust to TID   - If no effect, consider increasing dose or adding Diuril or metolazone   - Titrate to UOP, target 2-3L UOP and net 1-2L negative daily  - Continue GDMT with Toprol  BID, Farxiga 10mg qd and increasing Entresto to 49-51 BID  - Repeat Echo shows moderate pulmonary HTN with pressures of 51 mmHg  - Fluid restriction,1.5L and low Na diet  - Strict I/Os, daily weights and volume assessments; dry weight reported to be 340 lbs  - Monitor electrolytes with daily CMP, maintain Mg > 2 and K > 4.  - f/u with cardiology outpatient  - consult placed for life vest    - life vest received

## 2024-11-21 NOTE — ASSESSMENT & PLAN NOTE
Patients blood pressure range in the last 24 hours was: BP  Min: 107/53  Max: 126/58.The patient's inpatient anti-hypertensive regimen is listed below:  Current Antihypertensives  metoprolol succinate (TOPROL-XL) 24 hr tablet 100 mg, 2 times daily, Oral  furosemide injection 80 mg, 3 times daily, Intravenous  hydrALAZINE 10 mg 1 tablet, hydrALAZINE 25 mg 1 tablet, isorsorbide dinitrate 20 mg 1 tablet combination, 3 times daily, Oral  , 2 times daily, Oral  , Daily, Oral  , 2 times daily, Oral    Plan  - BP is controlled, no changes needed to their regimen  - Adjust regimen as needed to optimize BP control  - Continue Bidil 20/37.5mg

## 2024-11-21 NOTE — ASSESSMENT & PLAN NOTE
Diagnosed in 2020. Aunt reports that patient has been in the hospital in [place] many times, and that there was a period he was hospitalized almost every other week for HF exacerbation. Unknown etiology. Established with [facility] cardiology in July 2024 and was deemed not to be a transplant candidate due to BMI > 35.    - See acute on chronic HF

## 2024-11-21 NOTE — NURSING
Pt free of falls/trauma/injuries.  Denies c/o SOB, CP, or discomfort. Generalized skin remains CDI;dependent and generalized edema noted.  Pt being diuresed with lasix IV push ; diuresing well. Wt trending down .  Electrolytes replaced as ordered. Pt tolerating plan of care. Pt is non compliant with food and fluid restrictions, snacks were seen in pt room, pt was educated on the importance of not eating foods high in sodium and the importance of strict I&OS.

## 2024-11-21 NOTE — ASSESSMENT & PLAN NOTE
Body mass index is 49.76 kg/m². Morbid obesity complicates all aspects of disease management from diagnostic modalities to treatment. Weight loss encouraged and health benefits explained to patient.

## 2024-11-21 NOTE — PROGRESS NOTES
"[facility] - Cardiology [facility] Medicine  Progress Note    Patient Name: Michael Conti  MRN: [id number]  Patient Class: IP- Inpatient   Admission Date: 11/16/2024  Length of Stay: 4 days  Attending Physician: Yudelka Moyer MD  Primary Care Provider: Lele Verde NP (Inactive)        Subjective:     Principal Problem:Acute on chronic combined systolic and diastolic heart failure        HPI:  Michael Conti is a 24 year old man with non-ischemic cardiomyopathy with EF 10-15%, HTN, and obesity BMI of 55, who presents on 11/16 with worsening shortness of breath and fluid retention for the last  Patient reports these symptoms have been getting worse over the last few weeks. He was seen in Heart Transplant clinic on 11/15 and reported this to his cardiologist and was instructed to present to the hospital for admission for decompensated CHF, however, patient wanted to gather his belongings from home first prior to presenting. Patient reports that his symptoms are consistent with past HF exacerbations and he and his aunt at bedside report that the patient has been hospitalized in [place], where they live, innumerable times in the recent past. Patient denies any other symptoms apart from his SOB and edema; denies fever, chills, rhinorrhea, congestion, n/v/d. He endorses a non productive cough, but feels like there is phlegm he is unable to expectorate. He reports to me that for the last 3-4 days, he has also stopped taking some of his heart and fluid medications (Entresto and Bumex) due to misunderstanding his cardiologist regarding his Bumex; however he also admits that he stopped because he had "given up". It appears he had been on several medications at the same time that may not have been optimal as well, per his cardiologist's note. Regarding diet, he reports he has been trying to adhere closely to fluid restriction and has been generally trying to eat healthy, including baked chicken with sodium " substitutes like Mrs. Dash. He notes that his dry weight is 340 lbs, and he is currently at 357 lbs.    In the ED, patient afebrile and significantly hypertensive, up to 193/81. Labs notable for BNP 2400, troponin 0.166, K 3.0, and Cr 1.6. CXR with pulmonary vascular congestion. Patient given 1 dose of IV lasix 80 and admitted to Hospital Medicine for acute HF exacerbation.    Overview/Hospital Course:  Patient admitted for SOB 2/2 CHF exacerbation. BNP of 2,396, prior was 4,561 on 11/15. Given furosemide 80mg IV. Patient fell overnight. Camera installed in room. Starting Bidil and attempting to get patient back on a Life Vest. Life vest received. F/u with cardiology.    Interval History: NAEON. AAO x3. Patient complains of a cough. Says that his SOB continues to improve. Denies any HA, chest pain or pressure, hemoptysis, abdominal pain, or weakness.      Review of Systems   Constitutional: Negative.  Negative for chills, diaphoresis, fatigue and fever.   HENT: Negative.  Negative for congestion, ear pain, hearing loss, rhinorrhea and sore throat.    Eyes: Negative.    Respiratory:  Positive for shortness of breath. Negative for cough and chest tightness.    Cardiovascular: Negative.  Negative for chest pain and palpitations.   Gastrointestinal: Negative.  Negative for abdominal distention, abdominal pain, blood in stool, constipation, diarrhea, nausea and vomiting.   Genitourinary: Negative.  Negative for dysuria, frequency and hematuria.   Musculoskeletal: Negative.  Negative for back pain, joint swelling and neck pain.   Skin: Negative.    Neurological:  Negative for dizziness, seizures, syncope, weakness, light-headedness and headaches.   Psychiatric/Behavioral: Negative.       Objective:     Vital Signs (Most Recent):  Temp: 97.9 °F (36.6 °C) (11/21/24 0725)  Pulse: 99 (11/21/24 0725)  Resp: 19 (11/21/24 0725)  BP: 122/66 (11/21/24 0725)  SpO2: 97 % (11/21/24 0725) Vital Signs (24h Range):  Temp:  [97.2 °F  (36.2 °C)-98.8 °F (37.1 °C)] 97.9 °F (36.6 °C)  Pulse:  [] 99  Resp:  [16-19] 19  SpO2:  [95 %-99 %] 97 %  BP: (107-126)/(53-75) 122/66     Weight: (!) 157.3 kg (346 lb 12.5 oz)  Body mass index is 49.76 kg/m².    Intake/Output Summary (Last 24 hours) at 11/21/2024 0827  Last data filed at 11/21/2024 0000  Gross per 24 hour   Intake 1182 ml   Output 3200 ml   Net -2018 ml         Physical Exam  Constitutional:       General: He is awake. He is not in acute distress.     Appearance: Normal appearance. He is not ill-appearing.   HENT:      Head: Normocephalic.      Nose: Congestion and rhinorrhea present.   Eyes:      Extraocular Movements: Extraocular movements intact.   Cardiovascular:      Rate and Rhythm: Normal rate and regular rhythm.      Pulses: Normal pulses.      Heart sounds: Normal heart sounds. No murmur heard.     No friction rub. No gallop.   Pulmonary:      Effort: Pulmonary effort is normal. No respiratory distress.      Breath sounds: Normal breath sounds. No wheezing or rales.   Abdominal:      General: Bowel sounds are normal. There is no distension.      Palpations: Abdomen is soft.      Tenderness: There is no abdominal tenderness. There is no guarding.   Musculoskeletal:         General: Swelling present.      Cervical back: Normal range of motion. No rigidity or tenderness.      Right lower leg: Edema present.      Left lower leg: Edema present.   Skin:     General: Skin is warm.   Neurological:      General: No focal deficit present.      Mental Status: He is alert and oriented to person, place, and time.   Psychiatric:         Mood and Affect: Mood normal.         Behavior: Behavior normal.             Significant Labs: All pertinent labs within the past 24 hours have been reviewed.  CBC:   Recent Labs   Lab 11/20/24  0657 11/21/24  0535   WBC 11.55 8.87   HGB 16.3 15.4   HCT 51.7 48.3    287     CMP:   Recent Labs   Lab 11/19/24  1449 11/20/24  0657 11/21/24  0535    138  138   K 3.8 3.8 4.1    104 105   CO2 24 24 22*   GLU 91 74 81   BUN 26* 25* 27*   CREATININE 1.6* 1.5* 1.4   CALCIUM 9.5 9.4 9.6   PROT  --  7.3 7.1   ALBUMIN  --  3.5 3.3*   BILITOT  --  0.7 0.5   ALKPHOS  --  89 87   AST  --  21 24   ALT  --  18 18   ANIONGAP 10 10 11       Significant Imaging: I have reviewed all pertinent imaging results/findings within the past 24 hours.    Assessment/Plan:      * Acute on chronic combined systolic and diastolic heart failure  24M w/hx of NICM with EF 10-15% and morbid obesity who presents with acute HF exacerbation. Worsening symptoms and exacerbation likely in part due to some medication non-adherence, but also possibly due to the fact that his CHF medication regimen was not optimized with his cardiologist reporting that he was taking two beta blockers at the same time as well as lisinopril along with Entresto. On presentation, BNP 2400, troponin 0.166, and CXR with evidence of pulmonary congestion. Exam with peripheral edema and mild rales auscultated, consistent with acute CHF.    Plan:  - Continue diuresis with IV lasix 80mg; will adjust to TID   - If no effect, consider increasing dose or adding Diuril or metolazone   - Titrate to UOP, target 2-3L UOP and net 1-2L negative daily  - Continue GDMT with Toprol  BID, Farxiga 10mg qd and increasing Entresto to 49-51 BID  - Repeat Echo shows moderate pulmonary HTN with pressures of 51 mmHg  - Fluid restriction,1.5L and low Na diet  - Strict I/Os, daily weights and volume assessments; dry weight reported to be 340 lbs  - Monitor electrolytes with daily CMP, maintain Mg > 2 and K > 4.  - f/u with cardiology outpatient  - consult placed for life vest    - life vest received    Primary hypertension  Patients blood pressure range in the last 24 hours was: BP  Min: 107/53  Max: 126/58.The patient's inpatient anti-hypertensive regimen is listed below:  Current Antihypertensives  metoprolol succinate (TOPROL-XL) 24 hr  tablet 100 mg, 2 times daily, Oral  furosemide injection 80 mg, 3 times daily, Intravenous  hydrALAZINE 10 mg 1 tablet, hydrALAZINE 25 mg 1 tablet, isorsorbide dinitrate 20 mg 1 tablet combination, 3 times daily, Oral  , 2 times daily, Oral  , Daily, Oral  , 2 times daily, Oral    Plan  - BP is controlled, no changes needed to their regimen  - Adjust regimen as needed to optimize BP control  - Continue Bidil 20/37.5mg    Non-ischemic cardiomyopathy  Diagnosed in 2020. Aunt reports that patient has been in the hospital in [place] many times, and that there was a period he was hospitalized almost every other week for HF exacerbation. Unknown etiology. Established with [facility] cardiology in July 2024 and was deemed not to be a transplant candidate due to BMI > 35.    - See acute on chronic HF      Severe obesity (BMI >= 40)  Body mass index is 49.76 kg/m². Morbid obesity complicates all aspects of disease management from diagnostic modalities to treatment. Weight loss encouraged and health benefits explained to patient.           VTE Risk Mitigation (From admission, onward)           Ordered     enoxaparin injection 40 mg  Every 12 hours         11/17/24 0132     IP VTE HIGH RISK PATIENT  Once         11/17/24 0132     Place sequential compression device  Until discontinued         11/17/24 0132                    Discharge Planning   YU: 11/22/2024     Code Status: Full Code   Is the patient medically ready for discharge?:     Reason for patient still in hospital (select all that apply): Pending disposition  Discharge Plan A: Home with family                  Puja Du MD  PGY-1  Department of Hospital Medicine   [facility] - Cardiology Stepdown

## 2024-11-21 NOTE — NURSING
Patient is ready for discharge. Patient stable alert and oriented x4. VSS. IVs removed. No complaints of chest pain, SOB, palpitations, dizziness, lightheadedness,headache, or n/v. Discussed discharge plan. Reviewed medications and side effects, appointments, and answered questions with patient. __ RX called into [facility] pharmacy downstairs.

## 2024-11-21 NOTE — PLAN OF CARE
"  Follow up with Gee Soto MD  Monday Nov 25, 2024  [facility] f/u at 3:10 pm. Please bring discharge summary, ID, Insurance card, and medication list.       Documents have been faxed to [phone]    Your fax has been successfully sent to [phone] at [phone].  ------------------------------------------------------------  From: [id number]  ------------------------------------------------------------  11/21/2024 2:03:43 PM Transmission Record   Sent to [phone] with remote ID "[id number]"   Result: (0/339;0/0) Success   Page record: 1 - 12   Elapsed time: 04:17 on channel 44      JOSÉ MIGUEL Lira, RSW, BSW  Case Management  [id number]   "

## 2024-11-22 ENCOUNTER — TELEPHONE (OUTPATIENT)
Dept: TRANSPLANT | Facility: CLINIC | Age: 24
End: 2024-11-22
Payer: MEDICAID

## 2024-12-04 ENCOUNTER — TELEPHONE (OUTPATIENT)
Dept: BARIATRICS | Facility: CLINIC | Age: 24
End: 2024-12-04
Payer: MEDICAID

## 2025-05-07 ENCOUNTER — HOSPITAL ENCOUNTER (INPATIENT)
Facility: HOSPITAL | Age: 25
LOS: 4 days | Discharge: HOME | DRG: 280 | End: 2025-05-11
Attending: STUDENT IN AN ORGANIZED HEALTH CARE EDUCATION/TRAINING PROGRAM | Admitting: INTERNAL MEDICINE
Payer: MEDICAID

## 2025-05-07 DIAGNOSIS — I50.43 ACUTE ON CHRONIC COMBINED SYSTOLIC AND DIASTOLIC HEART FAILURE: Primary | ICD-10-CM

## 2025-05-07 DIAGNOSIS — R00.0 TACHYCARDIA: ICD-10-CM

## 2025-05-07 DIAGNOSIS — R07.9 CHEST PAIN: ICD-10-CM

## 2025-05-07 LAB
ALBUMIN SERPL-MCNC: 3.1 G/DL (ref 3.5–5)
ALBUMIN/GLOB SERPL: 0.7 RATIO (ref 1.1–2)
ALP SERPL-CCNC: 91 UNIT/L (ref 40–150)
ALT SERPL-CCNC: 21 UNIT/L (ref 0–55)
ANION GAP SERPL CALC-SCNC: 14 MEQ/L
AST SERPL-CCNC: 40 UNIT/L (ref 11–45)
BASOPHILS # BLD AUTO: 0.03 X10(3)/MCL
BASOPHILS NFR BLD AUTO: 0.3 %
BILIRUB SERPL-MCNC: 0.9 MG/DL
BNP BLD-MCNC: 7491.6 PG/ML
BUN SERPL-MCNC: 24.6 MG/DL (ref 8.9–20.6)
CALCIUM SERPL-MCNC: 8.5 MG/DL (ref 8.4–10.2)
CHLORIDE SERPL-SCNC: 103 MMOL/L (ref 98–107)
CO2 SERPL-SCNC: 21 MMOL/L (ref 22–29)
CREAT SERPL-MCNC: 1.74 MG/DL (ref 0.72–1.25)
CREAT/UREA NIT SERPL: 14
EOSINOPHIL # BLD AUTO: 0.02 X10(3)/MCL (ref 0–0.9)
EOSINOPHIL NFR BLD AUTO: 0.2 %
ERYTHROCYTE [DISTWIDTH] IN BLOOD BY AUTOMATED COUNT: 16.2 % (ref 11.5–17)
FLUAV AG UPPER RESP QL IA.RAPID: NOT DETECTED
FLUBV AG UPPER RESP QL IA.RAPID: NOT DETECTED
GFR SERPLBLD CREATININE-BSD FMLA CKD-EPI: 55 ML/MIN/1.73/M2
GLOBULIN SER-MCNC: 4.2 GM/DL (ref 2.4–3.5)
GLUCOSE SERPL-MCNC: 89 MG/DL (ref 74–100)
HCT VFR BLD AUTO: 37.9 % (ref 42–52)
HGB BLD-MCNC: 12 G/DL (ref 14–18)
IMM GRANULOCYTES # BLD AUTO: 0.04 X10(3)/MCL (ref 0–0.04)
IMM GRANULOCYTES NFR BLD AUTO: 0.5 %
LYMPHOCYTES # BLD AUTO: 1.84 X10(3)/MCL (ref 0.6–4.6)
LYMPHOCYTES NFR BLD AUTO: 20.8 %
MAGNESIUM SERPL-MCNC: 1.8 MG/DL (ref 1.6–2.6)
MCH RBC QN AUTO: 25.1 PG (ref 27–31)
MCHC RBC AUTO-ENTMCNC: 31.7 G/DL (ref 33–36)
MCV RBC AUTO: 79.1 FL (ref 80–94)
MONOCYTES # BLD AUTO: 0.71 X10(3)/MCL (ref 0.1–1.3)
MONOCYTES NFR BLD AUTO: 8 %
NEUTROPHILS # BLD AUTO: 6.2 X10(3)/MCL (ref 2.1–9.2)
NEUTROPHILS NFR BLD AUTO: 70.2 %
NRBC BLD AUTO-RTO: 0.6 %
PLATELET # BLD AUTO: 279 X10(3)/MCL (ref 130–400)
PMV BLD AUTO: 9.6 FL (ref 7.4–10.4)
POTASSIUM SERPL-SCNC: 4.3 MMOL/L (ref 3.5–5.1)
PROT SERPL-MCNC: 7.3 GM/DL (ref 6.4–8.3)
RBC # BLD AUTO: 4.79 X10(6)/MCL (ref 4.7–6.1)
RSV A 5' UTR RNA NPH QL NAA+PROBE: NOT DETECTED
SARS-COV-2 RNA RESP QL NAA+PROBE: NOT DETECTED
SODIUM SERPL-SCNC: 138 MMOL/L (ref 136–145)
TROPONIN I SERPL-MCNC: 0.15 NG/ML (ref 0–0.04)
TSH SERPL-ACNC: 2.17 UIU/ML (ref 0.35–4.94)
WBC # BLD AUTO: 8.84 X10(3)/MCL (ref 4.5–11.5)

## 2025-05-07 PROCEDURE — 83735 ASSAY OF MAGNESIUM: CPT | Performed by: PHYSICIAN ASSISTANT

## 2025-05-07 PROCEDURE — 80053 COMPREHEN METABOLIC PANEL: CPT | Performed by: PHYSICIAN ASSISTANT

## 2025-05-07 PROCEDURE — 5A09357 ASSISTANCE WITH RESPIRATORY VENTILATION, LESS THAN 24 CONSECUTIVE HOURS, CONTINUOUS POSITIVE AIRWAY PRESSURE: ICD-10-PCS | Performed by: INTERNAL MEDICINE

## 2025-05-07 PROCEDURE — 63600175 PHARM REV CODE 636 W HCPCS: Performed by: STUDENT IN AN ORGANIZED HEALTH CARE EDUCATION/TRAINING PROGRAM

## 2025-05-07 PROCEDURE — 93010 ELECTROCARDIOGRAM REPORT: CPT | Mod: ,,, | Performed by: INTERNAL MEDICINE

## 2025-05-07 PROCEDURE — 96372 THER/PROPH/DIAG INJ SC/IM: CPT | Performed by: STUDENT IN AN ORGANIZED HEALTH CARE EDUCATION/TRAINING PROGRAM

## 2025-05-07 PROCEDURE — 25000003 PHARM REV CODE 250: Performed by: STUDENT IN AN ORGANIZED HEALTH CARE EDUCATION/TRAINING PROGRAM

## 2025-05-07 PROCEDURE — 85025 COMPLETE CBC W/AUTO DIFF WBC: CPT | Performed by: PHYSICIAN ASSISTANT

## 2025-05-07 PROCEDURE — 99285 EMERGENCY DEPT VISIT HI MDM: CPT | Mod: 25

## 2025-05-07 PROCEDURE — 11000001 HC ACUTE MED/SURG PRIVATE ROOM

## 2025-05-07 PROCEDURE — 96374 THER/PROPH/DIAG INJ IV PUSH: CPT

## 2025-05-07 PROCEDURE — 83880 ASSAY OF NATRIURETIC PEPTIDE: CPT | Performed by: PHYSICIAN ASSISTANT

## 2025-05-07 PROCEDURE — 0241U COVID/RSV/FLU A&B PCR: CPT | Performed by: PHYSICIAN ASSISTANT

## 2025-05-07 PROCEDURE — 84443 ASSAY THYROID STIM HORMONE: CPT | Performed by: PHYSICIAN ASSISTANT

## 2025-05-07 PROCEDURE — 84484 ASSAY OF TROPONIN QUANT: CPT | Performed by: PHYSICIAN ASSISTANT

## 2025-05-07 PROCEDURE — 93005 ELECTROCARDIOGRAM TRACING: CPT

## 2025-05-07 RX ORDER — FUROSEMIDE 10 MG/ML
80 INJECTION INTRAMUSCULAR; INTRAVENOUS
Status: COMPLETED | OUTPATIENT
Start: 2025-05-07 | End: 2025-05-07

## 2025-05-07 RX ORDER — ENOXAPARIN SODIUM 150 MG/ML
150 INJECTION SUBCUTANEOUS
Status: COMPLETED | OUTPATIENT
Start: 2025-05-07 | End: 2025-05-07

## 2025-05-07 RX ADMIN — ENOXAPARIN SODIUM 150 MG: 150 INJECTION SUBCUTANEOUS at 10:05

## 2025-05-07 RX ADMIN — FUROSEMIDE 80 MG: 10 INJECTION, SOLUTION INTRAVENOUS at 10:05

## 2025-05-07 RX ADMIN — POTASSIUM BICARBONATE 40 MEQ: 391 TABLET, EFFERVESCENT ORAL at 10:05

## 2025-05-08 LAB
BASOPHILS # BLD AUTO: 0.03 X10(3)/MCL
BASOPHILS NFR BLD AUTO: 0.3 %
EOSINOPHIL # BLD AUTO: 0.03 X10(3)/MCL (ref 0–0.9)
EOSINOPHIL NFR BLD AUTO: 0.3 %
ERYTHROCYTE [DISTWIDTH] IN BLOOD BY AUTOMATED COUNT: 15.9 % (ref 11.5–17)
HCT VFR BLD AUTO: 36.2 % (ref 42–52)
HGB BLD-MCNC: 11.5 G/DL (ref 14–18)
IMM GRANULOCYTES # BLD AUTO: 0.02 X10(3)/MCL (ref 0–0.04)
IMM GRANULOCYTES NFR BLD AUTO: 0.2 %
LYMPHOCYTES # BLD AUTO: 1.74 X10(3)/MCL (ref 0.6–4.6)
LYMPHOCYTES NFR BLD AUTO: 20 %
MCH RBC QN AUTO: 25 PG (ref 27–31)
MCHC RBC AUTO-ENTMCNC: 31.8 G/DL (ref 33–36)
MCV RBC AUTO: 78.7 FL (ref 80–94)
MONOCYTES # BLD AUTO: 0.81 X10(3)/MCL (ref 0.1–1.3)
MONOCYTES NFR BLD AUTO: 9.3 %
NEUTROPHILS # BLD AUTO: 6.08 X10(3)/MCL (ref 2.1–9.2)
NEUTROPHILS NFR BLD AUTO: 69.9 %
NRBC BLD AUTO-RTO: 0.5 %
OHS QRS DURATION: 82 MS
OHS QTC CALCULATION: 468 MS
PLATELET # BLD AUTO: 252 X10(3)/MCL (ref 130–400)
PMV BLD AUTO: 9.8 FL (ref 7.4–10.4)
RBC # BLD AUTO: 4.6 X10(6)/MCL (ref 4.7–6.1)
TROPONIN I SERPL-MCNC: 0.12 NG/ML (ref 0–0.04)
WBC # BLD AUTO: 8.71 X10(3)/MCL (ref 4.5–11.5)

## 2025-05-08 PROCEDURE — 63600175 PHARM REV CODE 636 W HCPCS: Performed by: STUDENT IN AN ORGANIZED HEALTH CARE EDUCATION/TRAINING PROGRAM

## 2025-05-08 PROCEDURE — 21400001 HC TELEMETRY ROOM

## 2025-05-08 PROCEDURE — 27100171 HC OXYGEN HIGH FLOW UP TO 24 HOURS

## 2025-05-08 PROCEDURE — 99900035 HC TECH TIME PER 15 MIN (STAT)

## 2025-05-08 PROCEDURE — 11000001 HC ACUTE MED/SURG PRIVATE ROOM

## 2025-05-08 PROCEDURE — 84484 ASSAY OF TROPONIN QUANT: CPT | Performed by: NURSE PRACTITIONER

## 2025-05-08 PROCEDURE — 27000190 HC CPAP FULL FACE MASK W/VALVE

## 2025-05-08 PROCEDURE — 94760 N-INVAS EAR/PLS OXIMETRY 1: CPT

## 2025-05-08 PROCEDURE — 94660 CPAP INITIATION&MGMT: CPT

## 2025-05-08 PROCEDURE — 85025 COMPLETE CBC W/AUTO DIFF WBC: CPT | Performed by: NURSE PRACTITIONER

## 2025-05-08 PROCEDURE — 94799 UNLISTED PULMONARY SVC/PX: CPT

## 2025-05-08 PROCEDURE — 63600175 PHARM REV CODE 636 W HCPCS: Performed by: NURSE PRACTITIONER

## 2025-05-08 PROCEDURE — P9047 ALBUMIN (HUMAN), 25%, 50ML: HCPCS | Performed by: STUDENT IN AN ORGANIZED HEALTH CARE EDUCATION/TRAINING PROGRAM

## 2025-05-08 PROCEDURE — 99900031 HC PATIENT EDUCATION (STAT)

## 2025-05-08 PROCEDURE — 94761 N-INVAS EAR/PLS OXIMETRY MLT: CPT

## 2025-05-08 PROCEDURE — 25000003 PHARM REV CODE 250: Performed by: NURSE PRACTITIONER

## 2025-05-08 PROCEDURE — 36415 COLL VENOUS BLD VENIPUNCTURE: CPT | Performed by: NURSE PRACTITIONER

## 2025-05-08 RX ORDER — GUAIFENESIN AND DEXTROMETHORPHAN HYDROBROMIDE 10; 100 MG/5ML; MG/5ML
10 SYRUP ORAL EVERY 4 HOURS PRN
Status: DISCONTINUED | OUTPATIENT
Start: 2025-05-08 | End: 2025-05-11 | Stop reason: HOSPADM

## 2025-05-08 RX ORDER — ALPRAZOLAM 0.25 MG/1
0.25 TABLET ORAL 3 TIMES DAILY PRN
Status: DISCONTINUED | OUTPATIENT
Start: 2025-05-08 | End: 2025-05-11 | Stop reason: HOSPADM

## 2025-05-08 RX ORDER — FUROSEMIDE 10 MG/ML
80 INJECTION INTRAMUSCULAR; INTRAVENOUS
Status: COMPLETED | OUTPATIENT
Start: 2025-05-08 | End: 2025-05-08

## 2025-05-08 RX ORDER — FUROSEMIDE 10 MG/ML
80 INJECTION INTRAMUSCULAR; INTRAVENOUS ONCE
Status: COMPLETED | OUTPATIENT
Start: 2025-05-08 | End: 2025-05-08

## 2025-05-08 RX ORDER — HYDRALAZINE HYDROCHLORIDE 20 MG/ML
20 INJECTION INTRAMUSCULAR; INTRAVENOUS EVERY 4 HOURS PRN
Status: DISCONTINUED | OUTPATIENT
Start: 2025-05-08 | End: 2025-05-11 | Stop reason: HOSPADM

## 2025-05-08 RX ORDER — MILRINONE LACTATE 0.2 MG/ML
0.25 INJECTION, SOLUTION INTRAVENOUS CONTINUOUS
Status: DISCONTINUED | OUTPATIENT
Start: 2025-05-08 | End: 2025-05-10

## 2025-05-08 RX ORDER — ALBUMIN HUMAN 250 G/1000ML
25 SOLUTION INTRAVENOUS ONCE
Status: COMPLETED | OUTPATIENT
Start: 2025-05-08 | End: 2025-05-08

## 2025-05-08 RX ORDER — METOPROLOL SUCCINATE 50 MG/1
100 TABLET, EXTENDED RELEASE ORAL 2 TIMES DAILY
Status: DISCONTINUED | OUTPATIENT
Start: 2025-05-08 | End: 2025-05-08

## 2025-05-08 RX ORDER — SPIRONOLACTONE 25 MG/1
25 TABLET ORAL DAILY
Status: DISCONTINUED | OUTPATIENT
Start: 2025-05-08 | End: 2025-05-11 | Stop reason: HOSPADM

## 2025-05-08 RX ORDER — PROMETHAZINE HYDROCHLORIDE 25 MG/ML
25 INJECTION, SOLUTION INTRAMUSCULAR; INTRAVENOUS
Status: DISCONTINUED | OUTPATIENT
Start: 2025-05-08 | End: 2025-05-08

## 2025-05-08 RX ORDER — ONDANSETRON HYDROCHLORIDE 2 MG/ML
4 INJECTION, SOLUTION INTRAVENOUS
Status: COMPLETED | OUTPATIENT
Start: 2025-05-08 | End: 2025-05-08

## 2025-05-08 RX ORDER — FUROSEMIDE 10 MG/ML
80 INJECTION INTRAMUSCULAR; INTRAVENOUS 2 TIMES DAILY
Status: DISCONTINUED | OUTPATIENT
Start: 2025-05-08 | End: 2025-05-08

## 2025-05-08 RX ORDER — MORPHINE SULFATE 4 MG/ML
2 INJECTION, SOLUTION INTRAMUSCULAR; INTRAVENOUS
Refills: 0 | Status: DISCONTINUED | OUTPATIENT
Start: 2025-05-08 | End: 2025-05-11 | Stop reason: HOSPADM

## 2025-05-08 RX ORDER — LABETALOL HYDROCHLORIDE 5 MG/ML
20 INJECTION, SOLUTION INTRAVENOUS EVERY 4 HOURS PRN
Status: DISCONTINUED | OUTPATIENT
Start: 2025-05-08 | End: 2025-05-11 | Stop reason: HOSPADM

## 2025-05-08 RX ORDER — HYDROCODONE BITARTRATE AND ACETAMINOPHEN 7.5; 325 MG/1; MG/1
1 TABLET ORAL EVERY 6 HOURS PRN
Refills: 0 | Status: DISCONTINUED | OUTPATIENT
Start: 2025-05-08 | End: 2025-05-11 | Stop reason: HOSPADM

## 2025-05-08 RX ADMIN — FUROSEMIDE 80 MG: 10 INJECTION, SOLUTION INTRAVENOUS at 12:05

## 2025-05-08 RX ADMIN — FUROSEMIDE 80 MG: 10 INJECTION, SOLUTION INTRAVENOUS at 10:05

## 2025-05-08 RX ADMIN — MILRINONE LACTATE IN DEXTROSE 0.25 MCG/KG/MIN: 200 INJECTION, SOLUTION INTRAVENOUS at 04:05

## 2025-05-08 RX ADMIN — ALBUMIN (HUMAN) 25 G: 12.5 SOLUTION INTRAVENOUS at 02:05

## 2025-05-08 RX ADMIN — FUROSEMIDE 10 MG/HR: 10 INJECTION, SOLUTION INTRAMUSCULAR; INTRAVENOUS at 11:05

## 2025-05-08 RX ADMIN — SPIRONOLACTONE 25 MG: 25 TABLET, FILM COATED ORAL at 10:05

## 2025-05-08 RX ADMIN — MILRINONE LACTATE IN DEXTROSE 0.25 MCG/KG/MIN: 200 INJECTION, SOLUTION INTRAVENOUS at 10:05

## 2025-05-08 RX ADMIN — GUAIFENESIN AND DEXTROMETHORPHAN 10 ML: 100; 10 SYRUP ORAL at 02:05

## 2025-05-08 RX ADMIN — ONDANSETRON 4 MG: 2 INJECTION INTRAMUSCULAR; INTRAVENOUS at 12:05

## 2025-05-08 RX ADMIN — HYDROCODONE BITARTRATE AND ACETAMINOPHEN 1 TABLET: 7.5; 325 TABLET ORAL at 10:05

## 2025-05-08 RX ADMIN — LABETALOL HYDROCHLORIDE 20 MG: 5 INJECTION, SOLUTION INTRAVENOUS at 02:05

## 2025-05-08 NOTE — FIRST PROVIDER EVALUATION
"Medical screening examination initiated.  I have conducted a focused provider triage encounter, findings are as follows:    Brief history of present illness:  24AAM w/hx of CHF here for SOB that worsens at night. Evaluated [place] [facility] and told he needed to be admitted but was then discharged. Grandmother at bedside. Took all medication as prescribed today. Defibrillator. On thinners. EF 10%    Vitals:    05/07/25 2019   BP: (!) 149/105   Pulse: (!) 120   Resp: (!) 24   Temp: 97.8 °F (36.6 °C)   SpO2: 98%   Weight: (!) 170.1 kg (375 lb)   Height: 5' 10" (1.778 m)       Pertinent physical exam:  tachypnea.    Brief workup plan:  labs and imaging.     Preliminary workup initiated; this workup will be continued and followed by the physician or advanced practice provider that is assigned to the patient when roomed.  "

## 2025-05-08 NOTE — ED PROVIDER NOTES
Encounter Date: 5/7/2025    SCRIBE #1 NOTE: I, Tenisha Schneider, am scribing for, and in the presence of,  Aly Florentino MD. I have scribed the entire note.     History     Chief Complaint   Patient presents with    Shortness of Breath     D/c'd from [facility] 1 week ago for CHF exacerbation. Pt reports SOB worsening since last PM. Also reports BLE swelling. Hx of defibrillator, also on thinners. Denies chest pain. Tachypneic and labored breathing on arrival.     24 year old male with a hx of HTN and CHF presents to the ED for SOB. Pt states he has been experiencing worsening SOB and bilateral leg swelling for the last few days. Pt was seen at [facility] for similar symptoms last week. Pt states he has gone up from 350 pounds to 375 pounds. Pt is on a blood thinner.     The history is provided by the patient. No  was used.     Review of patient's allergies indicates:  No Known Allergies  Past Medical History:   Diagnosis Date    CHF (congestive heart failure)     Hypertension      Past Surgical History:   Procedure Laterality Date    CARDIAC CATHETERIZATION       No family history on file.  Social History[1]  Review of Systems   Constitutional:  Positive for unexpected weight change. Negative for fever.   HENT:  Negative for sore throat.    Eyes:  Negative for visual disturbance.   Respiratory:  Positive for shortness of breath.    Cardiovascular:  Positive for leg swelling. Negative for chest pain.   Gastrointestinal:  Negative for abdominal pain.   Genitourinary:  Negative for dysuria.   Musculoskeletal:  Negative for joint swelling.   Skin:  Negative for rash.   Neurological:  Negative for weakness.   Psychiatric/Behavioral:  Negative for confusion.    All other systems reviewed and are negative.      Physical Exam     Initial Vitals [05/07/25 2019]   BP Pulse Resp Temp SpO2   (!) 149/105 (!) 120 (!) 24 97.8 °F (36.6 °C) 98 %      MAP       --         Physical Exam    Nursing note and vitals  reviewed.  Constitutional: He appears well-developed and well-nourished. He is not diaphoretic. No distress.   HENT:   Head: Normocephalic and atraumatic.   Eyes: Conjunctivae and EOM are normal. Pupils are equal, round, and reactive to light.   Neck:   Normal range of motion.  Cardiovascular:  Normal rate, regular rhythm, normal heart sounds and intact distal pulses.           No murmur heard.  Pulmonary/Chest: No respiratory distress. He has no wheezes.   Crackles at bilateral bases.   Abdominal: Abdomen is soft. He exhibits no distension. There is no abdominal tenderness.   Musculoskeletal:         General: No tenderness or edema. Normal range of motion.      Cervical back: Normal range of motion.      Comments: 3+ bilateral lower extremity edema.      Neurological: He is alert and oriented to person, place, and time. No cranial nerve deficit.   Skin: Skin is warm and dry. Capillary refill takes less than 2 seconds. No rash noted. No erythema.   Psychiatric: He has a normal mood and affect.       ED Course   Procedures  Labs Reviewed   COMPREHENSIVE METABOLIC PANEL - Abnormal       Result Value    Sodium 138      Potassium 4.3      Chloride 103      CO2 21 (*)     Glucose 89      Blood Urea Nitrogen 24.6 (*)     Creatinine 1.74 (*)     Calcium 8.5      Protein Total 7.3      Albumin 3.1 (*)     Globulin 4.2 (*)     Albumin/Globulin Ratio 0.7 (*)     Bilirubin Total 0.9      ALP 91      ALT 21      AST 40      eGFR 55      Anion Gap 14.0      BUN/Creatinine Ratio 14     TROPONIN I - Abnormal    Troponin-I 0.150 (*)    CBC WITH DIFFERENTIAL - Abnormal    WBC 8.84      RBC 4.79      Hgb 12.0 (*)     Hct 37.9 (*)     MCV 79.1 (*)     MCH 25.1 (*)     MCHC 31.7 (*)     RDW 16.2      Platelet 279      MPV 9.6      Neut % 70.2      Lymph % 20.8      Mono % 8.0      Eos % 0.2      Basophil % 0.3      Imm Grans % 0.5      Neut # 6.20      Lymph # 1.84      Mono # 0.71      Eos # 0.02      Baso # 0.03      Imm Gran # 0.04       NRBC% 0.6     MAGNESIUM - Normal    Magnesium Level 1.80     COVID/RSV/FLU A&B PCR - Normal    Influenza A PCR Not Detected      Influenza B PCR Not Detected      Respiratory Syncytial Virus PCR Not Detected      SARS-CoV-2 PCR Not Detected      Narrative:     The Xpert Xpress SARS-CoV-2/FLU/RSV plus is a rapid, multiplexed real-time PCR test intended for the simultaneous qualitative detection and differentiation of SARS-CoV-2, Influenza A, Influenza B, and respiratory syncytial virus (RSV) viral RNA in either nasopharyngeal swab or nasal swab specimens.         TSH - Normal    TSH 2.172     CBC W/ AUTO DIFFERENTIAL    Narrative:     The following orders were created for panel order CBC auto differential.  Procedure                               Abnormality         Status                     ---------                               -----------         ------                     CBC with Differential[[id number]]       Abnormal            Final result                 Please view results for these tests on the individual orders.   B-TYPE NATRIURETIC PEPTIDE     EKG Readings: (Independently Interpreted)   Initial Reading: No STEMI. Rhythm: Sinus Tachycardia. Heart Rate: 117. Ectopy: No Ectopy. Conduction: Normal. ST Segments: Normal ST Segments. T Waves: Normal. Axis: Normal.   Done on 5/7/25 at 2020.        Imaging Results              X-Ray Chest PA And Lateral (In process)                      Medications - No data to display  Medical Decision Making          Scribe Attestation:   Scribe #1: I performed the above scribed service and the documentation accurately describes the services I performed. I attest to the accuracy of the note.    Attending Attestation:           Physician Attestation for Scribe:  Physician Attestation Statement for Scribe #1: I, Aly Florentino MD, reviewed documentation, as scribed by Tenisha Schneider in my presence, and it is both accurate and complete.                                  Clinical  Impression:  Final diagnoses:  [R00.0] Tachycardia  [R07.9] Chest pain                     [1]  Social History  Tobacco Use    Smoking status: Never     Passive exposure: Never    Smokeless tobacco: Never   Substance Use Topics    Alcohol use: Yes    Drug use: Never

## 2025-05-08 NOTE — PLAN OF CARE
05/08/25 1159   Discharge Assessment   Assessment Type Discharge Planning Assessment   Confirmed/corrected address, phone number and insurance Yes   Confirmed Demographics Correct on Facesheet   Source of Information patient   When was your last doctors appointment?   (approx 1 month)   Reason For Admission Tachycardia   People in Home grandparent(s)   Facility Arrived From: Home   Do you expect to return to your current living situation? Yes   Do you have help at home or someone to help you manage your care at home? Yes   Who are your caregiver(s) and their phone number(s)? GrandmotherGonzalez [phone]   Prior to hospitilization cognitive status: Unable to Assess   Current cognitive status: Alert/Oriented   Walking or Climbing Stairs Difficulty no   Dressing/Bathing Difficulty no   Home Accessibility wheelchair accessible   Home Layout Able to live on 1st floor   Equipment Currently Used at Home none   Patient currently being followed by outpatient case management? No   Do you currently have service(s) that help you manage your care at home? No   Do you take prescription medications? Yes   Do you have prescription coverage? Yes   Coverage JENNIFER   Do you have any problems affording any of your prescribed medications? No   Is the patient taking medications as prescribed? yes   Who is going to help you get home at discharge? [place]   How do you get to doctors appointments? family or friend will provide;car, drives self   Are you on dialysis? No   Do you take coumadin? No   Discharge Plan A Home Health   Discharge Plan B Home   DME Needed Upon Discharge  none   Discharge Plan discussed with: Patient   Transition of Care Barriers None   Alcohol Use   Q1: How often do you have a drink containing alcohol? Never   Q2: How many drinks containing alcohol do you have on a typical day when you are drinking? None   Q3: How often do you have six or more drinks on one occasion? Never   Utilities   In the  past 12 months has the electric, gas, oil, or water company threatened to shut off services in your home? No   Health Literacy   How often do you need to have someone help you when you read instructions, pamphlets, or other written material from your doctor or pharmacy? Never   OTHER   Name(s) of People in Home grandmother. Sherlyn     Assessment completed at bedside.  Patient does not work.  He can drive.  Lives with his grandmother, Sherlyn.  She can assist with care and transportation at discharge.   PCP-Lele Verde  Pharmacy-[facility]    No DME in the home  No HH in the past    No discharge needs identified at this time.  Will continue to follow.

## 2025-05-08 NOTE — H&P
[facility]    Cardiology  History and Physical     Patient Name: Michael Conti  MRN: [id number]  Admission Date: 5/7/2025  Code Status: Prior   Attending Provider: Tra Donovan MD   Primary Care Physician: Lele Verde NP (Inactive)  Principal Problem:<principal problem not specified>    Patient information was obtained from patient, past medical records, and ER records.     Subjective:   Chief Complaint:       HPI:   Mr. Conti is a 25y/o male, followed by Dr. Flynn/Jacquelin, who presented to ED with c/o worsening SOB and peripheral edema since DC from [facility] last week. Admit /105, P 120, RR 24, O2 sat 98%. Workup revealed BUN/creatinine 24.6/1.74, BNP 7491, troponin 0.150. CXR revealing some pulmonary congestion. He has been treated with lasix and bipap and has been admitted to [facility]. Currently sitting up in chair on RA. Reports mild improvement in SOB      PMH: CHF, HTN, PHTN, NICMO, ADHD, Morbid Obesity  PSH: RHC, LHC , [employer] single chamber ICD  Family History: Mother - Heart Failure   Social History: Denies illicit drug use, alcohol use, and smoking.      Previous Cardiac Diagnostics:   TTE 04.30.25:  LV xize dilated. LVEF 30% with global hypokinesis. LVEDD 8.02cm. RV global systolic function is moderately reduced. Normal mitral valve structure and motion. Moderate MR is present.    Venous US 03.18.25:  1. The study quality is average.   2. The veins of the left lower extremity compress easily and augment well with normal phasic flow and no evidence of deep venous thrombosis or arterio-venous fistula on this study.      ECHO (9.13.24):  Left Ventricle: The left ventricle is severely dilated. Severe global hypokinesis present. There is severely reduced systolic function with a visually estimated ejection fraction of 10 -15%. There is diastolic dysfunction. Right Ventricle: Normal right ventricular cavity size. Systolic function is mildly reduced. Left Atrium:  Left atrium is severely dilated. Right Atrium: Right atrium is moderately dilated. Mitral Valve: There is mild regurgitation. Tricuspid Valve: There is mild regurgitation with the RVSP of 35mmHg. Pulmonic Valve: There is mild regurgitation. IVC/SVC: Elevated venous pressure at 15 mmHg.     LHC (7.23.24):   Left Ventricle: The left ventricle is severely dilated. Moderately increased ventricular mass. Normal wall thickness. There is moderate eccentric hypertrophy. Global hypokinesis present. There is severely reduced systolic function with a visually estimated ejection fraction of 10 -15%. Ejection fraction by visual approximation is 15%. Grade II diastolic dysfunction. Possible thrombus in the apex that is non-mobile and only seen on contrast ( both Dr Simon and I think likely not but there is enough suspicion that we cannot exclude). Right Ventricle: Mild right ventricular enlargement. Wall thickness is normal. Systolic function is borderline low to slightly reduced despite the normal TAPSE. Left Atrium: Left atrium is severely dilated. Right Atrium: Right atrium is mildly dilated. Mitral Valve: There is mild to moderate regurgitation. Tricuspid Valve: There is mild regurgitation. Pulmonary Artery: There is mild pulmonary hypertension. The estimated pulmonary artery systolic pressure is 44 mmHg. IVC/SVC: Normal venous pressure at 3 mmHg.     RHC/LHC (2.29.24):  LMCA: Normal. LAD: Normal. LCX: Normal. RCA: Normal         Past Medical History:   Diagnosis Date    CHF (congestive heart failure)     Hypertension        Past Surgical History:   Procedure Laterality Date    CARDIAC CATHETERIZATION         Review of patient's allergies indicates:  No Known Allergies    No current facility-administered medications on file prior to encounter.     Current Outpatient Medications on File Prior to Encounter   Medication Sig    FARXIGA 10 mg tablet Take 1 tablet (10 mg total) by mouth once daily.    isosorbide-hydrALAZINE 20-37.5  mg (BIDIL) 20-37.5 mg Tab Take 1 tablet by mouth 3 (three) times daily.    metoprolol succinate (TOPROL-XL) 100 MG 24 hr tablet Take 1 tablet (100 mg total) by mouth 2 (two) times daily.    nitroGLYCERIN (NITROSTAT) 0.4 MG SL tablet Place 0.4 mg under the tongue every 5 (five) minutes as needed.    potassium chloride (KLOR-CON) 10 MEQ TbSR Take 1 tablet (10 mEq total) by mouth once daily.    sacubitriL-valsartan (ENTRESTO) 49-51 mg per tablet Take 1 tablet by mouth 2 (two) times daily.    spironolactone (ALDACTONE) 25 MG tablet Take 1 tablet (25 mg total) by mouth once daily.    torsemide (DEMADEX) 20 MG Tab Take 2 tablets (40 mg total) by mouth 2 (two) times a day.    traZODone (DESYREL) 50 MG tablet Take 50 mg by mouth nightly as needed.     Family History    None       Tobacco Use    Smoking status: Never     Passive exposure: Never    Smokeless tobacco: Never   Substance and Sexual Activity    Alcohol use: Yes    Drug use: Never    Sexual activity: Not on file     Review of Systems   Cardiovascular:  Positive for dyspnea on exertion and orthopnea. Negative for chest pain.   Respiratory:  Positive for shortness of breath.    Skin: Negative.    Musculoskeletal: Negative.      Objective:     Vital Signs (Most Recent):  Temp: 97.6 °F (36.4 °C) (05/08/25 0722)  Pulse: 103 (05/08/25 0722)  Resp: 20 (05/08/25 0722)  BP: (!) 150/91 (05/08/25 0722)  SpO2: 95 % (05/08/25 0722) Vital Signs (24h Range):  Temp:  [97.6 °F (36.4 °C)-97.9 °F (36.6 °C)] 97.6 °F (36.4 °C)  Pulse:  [] 103  Resp:  [10-50] 20  SpO2:  [92 %-99 %] 95 %  BP: (119-181)/() 150/91     Weight: (!) 166.8 kg (367 lb 11.6 oz)  Body mass index is 52.76 kg/m².    SpO2: 95 %         Intake/Output Summary (Last 24 hours) at 5/8/2025 0818  Last data filed at 5/8/2025 0201  Gross per 24 hour   Intake --   Output 400 ml   Net -400 ml       Lines/Drains/Airways       Peripheral Intravenous Line  Duration                  Peripheral IV - Single Lumen  "05/07/25 2103 20 G Anterior;Distal;Right Forearm <1 day                    Physical Exam  Cardiovascular:      Rate and Rhythm: Regular rhythm. Tachycardia present.      Pulses: Normal pulses.      Heart sounds: Normal heart sounds.   Pulmonary:      Effort: Pulmonary effort is normal.      Breath sounds: Normal breath sounds.   Abdominal:      Palpations: Abdomen is soft.   Musculoskeletal:      Right lower leg: Edema present.      Left lower leg: Edema present.   Skin:     General: Skin is warm.      Capillary Refill: Capillary refill takes less than 2 seconds.   Neurological:      General: No focal deficit present.      Mental Status: He is alert.       EKG:       Home Medications:   Medications Ordered Prior to Encounter[1]  Current Schedule Inpatient Medications:    Continuous Infusions:    Significant Labs:   Chemistries:   Recent Labs   Lab 05/07/25 2033      K 4.3      CO2 21*   BUN 24.6*   CREATININE 1.74*   CALCIUM 8.5   PROT 7.3   BILITOT 0.9   ALKPHOS 91   ALT 21   AST 40   MG 1.80   TROPONINI 0.150*        CBC/Anemia Labs: Coags:    Recent Labs   Lab 05/07/25 2033   WBC 8.84   HGB 12.0*   HCT 37.9*      MCV 79.1*   RDW 16.2    No results for input(s): "PT", "INR", "APTT" in the last 168 hours.     Significant Imaging: EXAMINATION:  XR CHEST PA AND LATERAL     CLINICAL HISTORY:  , Chest pain, unspecified.     FINDINGS:  No alveolar consolidation, effusion, or pneumothorax is seen.   The thoracic aorta is normal  cardiac silhouette, is enlarged central pulmonary vessels and mediastinum are normal in size and are grossly unremarkable.   visualized osseous structures are grossly unremarkable.     Pacer pack with tips in the right ventricle     Impression:     No acute chest disease is identified..     Cardiomegaly        Electronically signed by:Abdirizak Verde  Date:                                            05/08/2025  Time:                                           " 09:17    Assessment:   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   --BNP 7491  NSTEMI type II s/t HF exacerbation   --Troponin peaked at 0.150  NICMO  -- TTE 04.30.25: EF 30% (improved from 10-15% 11.2024)  -- [facility] (2.29.24): Normal Coronaries   --[employer] Single chamber ICD  CKD III  --Creatinine 1.74  HHD  PHTN  VHD    - Mild MR/TR  ADHD  Morbid Obesity  No known history of GI Bleed     Plan:   Give Lasix 80 mg IVP now and then start Lasix drip 10 mg/hr  Start Primacor 0.275 mcg/kg/min  Hold BB for now due to addition of inotrope  Trend enzymes  Outpatient sleep study  Recheck BMP, CBC, and troponin      VTE Risk Mitigation (From admission, onward)      None            Maylin Bejarano, P  Cardiology  [facility]   Physician addendum:  I have seen and examined this patient as a split-shared visit with the MARTINEZ d/t complicated medical management of above problems written in assessment and high acuity requiring physician expertise in medical decision-making. I performed the substantive portion of the history and exam. Above medical decision-making is also formulated by me.    Cardiovascular exam:  S1, S2  Lungs:  fine crackles at bases.  Extremities:  + edema bilaterally    Plan:  Medications as above.  Continue supportive therapy.     Micky Oquendo MD  Cardiologist         [1]   No current facility-administered medications on file prior to encounter.     Current Outpatient Medications on File Prior to Encounter   Medication Sig Dispense Refill    FARXIGA 10 mg tablet Take 1 tablet (10 mg total) by mouth once daily. 90 tablet 2    isosorbide-hydrALAZINE 20-37.5 mg (BIDIL) 20-37.5 mg Tab Take 1 tablet by mouth 3 (three) times daily. 270 tablet 2    metoprolol succinate (TOPROL-XL) 100 MG 24 hr tablet Take 1 tablet (100 mg total) by mouth 2 (two) times daily. 180 tablet 2    nitroGLYCERIN (NITROSTAT) 0.4 MG SL tablet Place 0.4 mg under the tongue every 5 (five)  minutes as needed.      potassium chloride (KLOR-CON) 10 MEQ TbSR Take 1 tablet (10 mEq total) by mouth once daily. 90 tablet 2    sacubitriL-valsartan (ENTRESTO) 49-51 mg per tablet Take 1 tablet by mouth 2 (two) times daily. 180 tablet 2    spironolactone (ALDACTONE) 25 MG tablet Take 1 tablet (25 mg total) by mouth once daily. 90 tablet 2    torsemide (DEMADEX) 20 MG Tab Take 2 tablets (40 mg total) by mouth 2 (two) times a day. 180 tablet 2    traZODone (DESYREL) 50 MG tablet Take 50 mg by mouth nightly as needed.

## 2025-05-08 NOTE — ED TRIAGE NOTES
D/c'd from [facility] 1 week ago for CHF exacerbation. Pt reports SOB worsening since last PM. Also reports BLE swelling. Hx of defibrillator, also on thinners. Denies chest pain. Tachypneic and labored breathing on arrival.

## 2025-05-09 LAB
ALBUMIN SERPL-MCNC: 3.4 G/DL (ref 3.5–5)
ALBUMIN/GLOB SERPL: 1.1 RATIO (ref 1.1–2)
ALP SERPL-CCNC: 99 UNIT/L (ref 40–150)
ALT SERPL-CCNC: 17 UNIT/L (ref 0–55)
ANION GAP SERPL CALC-SCNC: 14 MEQ/L
AST SERPL-CCNC: 17 UNIT/L (ref 11–45)
BASOPHILS # BLD AUTO: 0.05 X10(3)/MCL
BASOPHILS NFR BLD AUTO: 0.6 %
BILIRUB SERPL-MCNC: 0.9 MG/DL
BUN SERPL-MCNC: 25.3 MG/DL (ref 8.9–20.6)
CALCIUM SERPL-MCNC: 8.3 MG/DL (ref 8.4–10.2)
CHLORIDE SERPL-SCNC: 100 MMOL/L (ref 98–107)
CO2 SERPL-SCNC: 28 MMOL/L (ref 22–29)
CREAT SERPL-MCNC: 1.61 MG/DL (ref 0.72–1.25)
CREAT/UREA NIT SERPL: 16
EOSINOPHIL # BLD AUTO: 0.1 X10(3)/MCL (ref 0–0.9)
EOSINOPHIL NFR BLD AUTO: 1.3 %
ERYTHROCYTE [DISTWIDTH] IN BLOOD BY AUTOMATED COUNT: 16.1 % (ref 11.5–17)
GFR SERPLBLD CREATININE-BSD FMLA CKD-EPI: >60 ML/MIN/1.73/M2
GLOBULIN SER-MCNC: 3.2 GM/DL (ref 2.4–3.5)
GLUCOSE SERPL-MCNC: 90 MG/DL (ref 74–100)
HCT VFR BLD AUTO: 40 % (ref 42–52)
HGB BLD-MCNC: 12.3 G/DL (ref 14–18)
IMM GRANULOCYTES # BLD AUTO: 0.04 X10(3)/MCL (ref 0–0.04)
IMM GRANULOCYTES NFR BLD AUTO: 0.5 %
LYMPHOCYTES # BLD AUTO: 1.8 X10(3)/MCL (ref 0.6–4.6)
LYMPHOCYTES NFR BLD AUTO: 22.7 %
MCH RBC QN AUTO: 24.6 PG (ref 27–31)
MCHC RBC AUTO-ENTMCNC: 30.8 G/DL (ref 33–36)
MCV RBC AUTO: 80 FL (ref 80–94)
MONOCYTES # BLD AUTO: 0.89 X10(3)/MCL (ref 0.1–1.3)
MONOCYTES NFR BLD AUTO: 11.2 %
NEUTROPHILS # BLD AUTO: 5.05 X10(3)/MCL (ref 2.1–9.2)
NEUTROPHILS NFR BLD AUTO: 63.7 %
NRBC BLD AUTO-RTO: 0.3 %
PLATELET # BLD AUTO: 265 X10(3)/MCL (ref 130–400)
PMV BLD AUTO: 9.3 FL (ref 7.4–10.4)
POTASSIUM SERPL-SCNC: 3.6 MMOL/L (ref 3.5–5.1)
PROT SERPL-MCNC: 6.6 GM/DL (ref 6.4–8.3)
RBC # BLD AUTO: 5 X10(6)/MCL (ref 4.7–6.1)
SODIUM SERPL-SCNC: 142 MMOL/L (ref 136–145)
WBC # BLD AUTO: 7.93 X10(3)/MCL (ref 4.5–11.5)

## 2025-05-09 PROCEDURE — 80053 COMPREHEN METABOLIC PANEL: CPT | Performed by: NURSE PRACTITIONER

## 2025-05-09 PROCEDURE — 85025 COMPLETE CBC W/AUTO DIFF WBC: CPT | Performed by: NURSE PRACTITIONER

## 2025-05-09 PROCEDURE — 63600175 PHARM REV CODE 636 W HCPCS: Performed by: NURSE PRACTITIONER

## 2025-05-09 PROCEDURE — 27000221 HC OXYGEN, UP TO 24 HOURS

## 2025-05-09 PROCEDURE — 99900035 HC TECH TIME PER 15 MIN (STAT)

## 2025-05-09 PROCEDURE — 25000003 PHARM REV CODE 250: Performed by: NURSE PRACTITIONER

## 2025-05-09 PROCEDURE — 36415 COLL VENOUS BLD VENIPUNCTURE: CPT | Performed by: NURSE PRACTITIONER

## 2025-05-09 PROCEDURE — 94660 CPAP INITIATION&MGMT: CPT

## 2025-05-09 PROCEDURE — 99900031 HC PATIENT EDUCATION (STAT)

## 2025-05-09 PROCEDURE — 21400001 HC TELEMETRY ROOM

## 2025-05-09 RX ADMIN — SPIRONOLACTONE 25 MG: 25 TABLET, FILM COATED ORAL at 08:05

## 2025-05-09 RX ADMIN — MILRINONE LACTATE IN DEXTROSE 0.25 MCG/KG/MIN: 200 INJECTION, SOLUTION INTRAVENOUS at 01:05

## 2025-05-09 RX ADMIN — MILRINONE LACTATE IN DEXTROSE 0.25 MCG/KG/MIN: 200 INJECTION, SOLUTION INTRAVENOUS at 09:05

## 2025-05-09 RX ADMIN — FUROSEMIDE 10 MG/HR: 10 INJECTION, SOLUTION INTRAMUSCULAR; INTRAVENOUS at 06:05

## 2025-05-09 RX ADMIN — MILRINONE LACTATE IN DEXTROSE 0.25 MCG/KG/MIN: 200 INJECTION, SOLUTION INTRAVENOUS at 05:05

## 2025-05-09 NOTE — PLAN OF CARE
Problem: Adult Inpatient Plan of Care  Goal: Plan of Care Review  Outcome: Progressing  Goal: Patient-Specific Goal (Individualized)  Outcome: Progressing  Goal: Absence of Hospital-Acquired Illness or Injury  Outcome: Progressing  Goal: Optimal Comfort and Wellbeing  Outcome: Progressing  Goal: Readiness for Transition of Care  Outcome: Progressing     Problem: Bariatric Environmental Safety  Goal: Safety Maintained with Care  Outcome: Progressing     Problem: Wound  Goal: Optimal Coping  Outcome: Progressing     Problem: Fall Injury Risk  Goal: Absence of Fall and Fall-Related Injury  Outcome: Progressing

## 2025-05-09 NOTE — PROGRESS NOTES
.cis [facility]    Cardiology  Progress Note    Patient Name: Michael Conti  MRN: [id number]  Admission Date: 5/7/2025  Code Status: Prior   Attending Provider: Tra Donovan MD   Primary Care Physician: Lele Verde NP (Inactive)  Principal Problem:<principal problem not specified>    Patient information was obtained from patient, past medical records, and ER records.     Subjective:   Chief Complaint:       HPI:   Mr. Conti is a 25y/o male, followed by Dr. Flynn/Jacquelin, who presented to ED with c/o worsening SOB and peripheral edema since DC from [facility] last week. Admit /105, P 120, RR 24, O2 sat 98%. Workup revealed BUN/creatinine 24.6/1.74, BNP 7491, troponin 0.150. CXR revealing some pulmonary congestion. He has been treated with lasix and bipap and has been admitted to [facility]. Currently sitting up in chair on RA. Reports mild improvement in SOB    Hospital Course :  5.9.2025 Patient sitting up in bedside chair. Nurse states he had an episode of hypoxia and desaturated to the low 90's. Patient is currently on room air, NAD, denies CP, Palps and SOB.     PMH: CHF, HTN, PHTN, NICMO, ADHD, Morbid Obesity  PSH: RHC, LHC , [employer] single chamber ICD  Family History: Mother - Heart Failure   Social History: Denies illicit drug use, alcohol use, and smoking.      Previous Cardiac Diagnostics:   TTE 04.30.25:  LV xize dilated. LVEF 30% with global hypokinesis. LVEDD 8.02cm. RV global systolic function is moderately reduced. Normal mitral valve structure and motion. Moderate MR is present.    Venous US 03.18.25:  1. The study quality is average.   2. The veins of the left lower extremity compress easily and augment well with normal phasic flow and no evidence of deep venous thrombosis or arterio-venous fistula on this study.      ECHO (9.13.24):  Left Ventricle: The left ventricle is severely dilated. Severe global hypokinesis present. There is severely reduced  systolic function with a visually estimated ejection fraction of 10 -15%. There is diastolic dysfunction. Right Ventricle: Normal right ventricular cavity size. Systolic function is mildly reduced. Left Atrium: Left atrium is severely dilated. Right Atrium: Right atrium is moderately dilated. Mitral Valve: There is mild regurgitation. Tricuspid Valve: There is mild regurgitation with the RVSP of 35mmHg. Pulmonic Valve: There is mild regurgitation. IVC/SVC: Elevated venous pressure at 15 mmHg.     LHC (7.23.24):   Left Ventricle: The left ventricle is severely dilated. Moderately increased ventricular mass. Normal wall thickness. There is moderate eccentric hypertrophy. Global hypokinesis present. There is severely reduced systolic function with a visually estimated ejection fraction of 10 -15%. Ejection fraction by visual approximation is 15%. Grade II diastolic dysfunction. Possible thrombus in the apex that is non-mobile and only seen on contrast ( both Dr Simon and I think likely not but there is enough suspicion that we cannot exclude). Right Ventricle: Mild right ventricular enlargement. Wall thickness is normal. Systolic function is borderline low to slightly reduced despite the normal TAPSE. Left Atrium: Left atrium is severely dilated. Right Atrium: Right atrium is mildly dilated. Mitral Valve: There is mild to moderate regurgitation. Tricuspid Valve: There is mild regurgitation. Pulmonary Artery: There is mild pulmonary hypertension. The estimated pulmonary artery systolic pressure is 44 mmHg. IVC/SVC: Normal venous pressure at 3 mmHg.     RHC/LHC (2.29.24):  LMCA: Normal. LAD: Normal. LCX: Normal. RCA: Normal         Past Medical History:   Diagnosis Date    CHF (congestive heart failure)     Hypertension        Past Surgical History:   Procedure Laterality Date    CARDIAC CATHETERIZATION         Review of patient's allergies indicates:  No Known Allergies    No current facility-administered medications on  file prior to encounter.     Current Outpatient Medications on File Prior to Encounter   Medication Sig    FARXIGA 10 mg tablet Take 1 tablet (10 mg total) by mouth once daily.    isosorbide-hydrALAZINE 20-37.5 mg (BIDIL) 20-37.5 mg Tab Take 1 tablet by mouth 3 (three) times daily.    metoprolol succinate (TOPROL-XL) 100 MG 24 hr tablet Take 1 tablet (100 mg total) by mouth 2 (two) times daily.    torsemide (DEMADEX) 20 MG Tab Take 2 tablets (40 mg total) by mouth 2 (two) times a day.    nitroGLYCERIN (NITROSTAT) 0.4 MG SL tablet Place 0.4 mg under the tongue every 5 (five) minutes as needed.    potassium chloride (KLOR-CON) 10 MEQ TbSR Take 1 tablet (10 mEq total) by mouth once daily.    sacubitriL-valsartan (ENTRESTO) 49-51 mg per tablet Take 1 tablet by mouth 2 (two) times daily.    spironolactone (ALDACTONE) 25 MG tablet Take 1 tablet (25 mg total) by mouth once daily.    traZODone (DESYREL) 50 MG tablet Take 50 mg by mouth nightly as needed.     Family History    None       Tobacco Use    Smoking status: Never     Passive exposure: Never    Smokeless tobacco: Never   Substance and Sexual Activity    Alcohol use: Yes    Drug use: Never    Sexual activity: Not on file     Review of Systems   Cardiovascular:  Positive for dyspnea on exertion and orthopnea. Negative for chest pain.   Respiratory:  Positive for shortness of breath.    Skin: Negative.    Musculoskeletal: Negative.      Objective:     Vital Signs (Most Recent):  Temp: 97.7 °F (36.5 °C) (05/09/25 1145)  Pulse: 110 (05/09/25 1145)  Resp: 18 (05/09/25 1145)  BP: (!) 160/98 (05/09/25 1145)  SpO2: 96 % (05/09/25 1145) Vital Signs (24h Range):  Temp:  [97.5 °F (36.4 °C)-98.3 °F (36.8 °C)] 97.7 °F (36.5 °C)  Pulse:  [] 110  Resp:  [18] 18  SpO2:  [95 %-98 %] 96 %  BP: (145-160)/(77-98) 160/98     Weight: (!) 165.9 kg (365 lb 11.9 oz)  Body mass index is 52.48 kg/m².    SpO2: 96 %         Intake/Output Summary (Last 24 hours) at 5/9/2025 1311  Last data  filed at 5/9/2025 0842  Gross per 24 hour   Intake 540 ml   Output 3476 ml   Net -2936 ml       Lines/Drains/Airways       Peripheral Intravenous Line  Duration                  Peripheral IV - Single Lumen 05/08/25 1123 18 G 2 1/4 in Anterior;Right Upper Arm 1 day         Peripheral IV - Single Lumen 05/08/25 2200 20 G Distal;Left;Posterior Upper Arm <1 day                    Physical Exam  Constitutional:       Appearance: He is obese.   Cardiovascular:      Rate and Rhythm: Regular rhythm. Tachycardia present.      Pulses: Normal pulses.      Heart sounds: Normal heart sounds.   Pulmonary:      Effort: Pulmonary effort is normal.      Breath sounds: Normal breath sounds.   Abdominal:      Palpations: Abdomen is soft.   Musculoskeletal:      Right lower leg: Edema present.      Left lower leg: Edema present.   Skin:     General: Skin is warm.      Capillary Refill: Capillary refill takes less than 2 seconds.   Neurological:      General: No focal deficit present.      Mental Status: He is alert.       EKG:       Home Medications:   Medications Ordered Prior to Encounter[1]  Current Schedule Inpatient Medications:   spironolactone  25 mg Oral Daily     Continuous Infusions:   furosemide (LASIX) 2 mg/mL continuous infusion (non-titrating)  10 mg/hr Intravenous Continuous 5 mL/hr at 05/09/25 0639 10 mg/hr at 05/09/25 0639    milrinone  0.25 mcg/kg/min Intravenous Continuous 12.5 mL/hr at 05/09/25 0950 0.25 mcg/kg/min at 05/09/25 0950     Significant Labs:   Chemistries:   Recent Labs   Lab 05/07/25 2033 05/08/25 0926 05/09/25  0604     --  142   K 4.3  --  3.6     --  100   CO2 21*  --  28   BUN 24.6*  --  25.3*   CREATININE 1.74*  --  1.61*   CALCIUM 8.5  --  8.3*   PROT 7.3  --  6.6   BILITOT 0.9  --  0.9   ALKPHOS 91  --  99   ALT 21  --  17   AST 40  --  17   MG 1.80  --   --    TROPONINI 0.150* 0.119*  --         CBC/Anemia Labs: Coags:    Recent Labs   Lab 05/07/25 2033 05/08/25 0926  "05/09/25  0604   WBC 8.84 8.71 7.93   HGB 12.0* 11.5* 12.3*   HCT 37.9* 36.2* 40.0*    252 265   MCV 79.1* 78.7* 80.0   RDW 16.2 15.9 16.1    No results for input(s): "PT", "INR", "APTT" in the last 168 hours.     Significant Imaging: EXAMINATION:  XR CHEST PA AND LATERAL     CLINICAL HISTORY:  , Chest pain, unspecified.     FINDINGS:  No alveolar consolidation, effusion, or pneumothorax is seen.   The thoracic aorta is normal  cardiac silhouette, is enlarged central pulmonary vessels and mediastinum are normal in size and are grossly unremarkable.   visualized osseous structures are grossly unremarkable.     Pacer pack with tips in the right ventricle     Impression:     No acute chest disease is identified..     Cardiomegaly        Electronically signed by:Abdirizak Verde  Date:                                            05/08/2025  Time:                                           09:17    Assessment:   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   --BNP 7491  NSTEMI type II s/t HF exacerbation   --Troponin peaked at 0.150  NICMO  -- TTE 04.30.25: EF 30% (improved from 10-15% 11.2024)  -- [facility] (2.29.24): Normal Coronaries   --[employer] Single chamber ICD  CKD III  --Creatinine 1.74  HHD  PHTN  VHD    - Mild MR/TR  ADHD  Morbid Obesity  No known history of GI Bleed     Plan:   Continue Lasix drip 10 mg/hr  Continue Primacor 0.275 mcg/kg/min  Hold BB for now due to addition of inotrope  Trend enzymes  Outpatient sleep study  Recheck BMP, CBC, and troponin            Maylin Bejarano, P  Cardiology  [facility]          [1]   No current facility-administered medications on file prior to encounter.     Current Outpatient Medications on File Prior to Encounter   Medication Sig Dispense Refill    FARXIGA 10 mg tablet Take 1 tablet (10 mg total) by mouth once daily. 90 tablet 2    isosorbide-hydrALAZINE 20-37.5 mg (BIDIL) 20-37.5 mg Tab Take 1 tablet by " mouth 3 (three) times daily. 270 tablet 2    metoprolol succinate (TOPROL-XL) 100 MG 24 hr tablet Take 1 tablet (100 mg total) by mouth 2 (two) times daily. 180 tablet 2    torsemide (DEMADEX) 20 MG Tab Take 2 tablets (40 mg total) by mouth 2 (two) times a day. 180 tablet 2    nitroGLYCERIN (NITROSTAT) 0.4 MG SL tablet Place 0.4 mg under the tongue every 5 (five) minutes as needed.      potassium chloride (KLOR-CON) 10 MEQ TbSR Take 1 tablet (10 mEq total) by mouth once daily. 90 tablet 2    sacubitriL-valsartan (ENTRESTO) 49-51 mg per tablet Take 1 tablet by mouth 2 (two) times daily. 180 tablet 2    spironolactone (ALDACTONE) 25 MG tablet Take 1 tablet (25 mg total) by mouth once daily. 90 tablet 2    traZODone (DESYREL) 50 MG tablet Take 50 mg by mouth nightly as needed.

## 2025-05-10 LAB
ALBUMIN SERPL-MCNC: 3.3 G/DL (ref 3.5–5)
ALBUMIN/GLOB SERPL: 1.1 RATIO (ref 1.1–2)
ALP SERPL-CCNC: 93 UNIT/L (ref 40–150)
ALT SERPL-CCNC: 16 UNIT/L (ref 0–55)
ANION GAP SERPL CALC-SCNC: 10 MEQ/L
AST SERPL-CCNC: 22 UNIT/L (ref 11–45)
BASOPHILS # BLD AUTO: 0.03 X10(3)/MCL
BASOPHILS NFR BLD AUTO: 0.3 %
BILIRUB SERPL-MCNC: 0.9 MG/DL
BUN SERPL-MCNC: 19 MG/DL (ref 8.9–20.6)
CALCIUM SERPL-MCNC: 8.6 MG/DL (ref 8.4–10.2)
CHLORIDE SERPL-SCNC: 104 MMOL/L (ref 98–107)
CO2 SERPL-SCNC: 25 MMOL/L (ref 22–29)
CREAT SERPL-MCNC: 1.26 MG/DL (ref 0.72–1.25)
CREAT/UREA NIT SERPL: 15
EOSINOPHIL # BLD AUTO: 0.1 X10(3)/MCL (ref 0–0.9)
EOSINOPHIL NFR BLD AUTO: 1.1 %
ERYTHROCYTE [DISTWIDTH] IN BLOOD BY AUTOMATED COUNT: 16.1 % (ref 11.5–17)
GFR SERPLBLD CREATININE-BSD FMLA CKD-EPI: >60 ML/MIN/1.73/M2
GLOBULIN SER-MCNC: 3.1 GM/DL (ref 2.4–3.5)
GLUCOSE SERPL-MCNC: 85 MG/DL (ref 74–100)
HCT VFR BLD AUTO: 38.3 % (ref 42–52)
HGB BLD-MCNC: 11.9 G/DL (ref 14–18)
IMM GRANULOCYTES # BLD AUTO: 0.03 X10(3)/MCL (ref 0–0.04)
IMM GRANULOCYTES NFR BLD AUTO: 0.3 %
LYMPHOCYTES # BLD AUTO: 1.64 X10(3)/MCL (ref 0.6–4.6)
LYMPHOCYTES NFR BLD AUTO: 18.4 %
MCH RBC QN AUTO: 24.6 PG (ref 27–31)
MCHC RBC AUTO-ENTMCNC: 31.1 G/DL (ref 33–36)
MCV RBC AUTO: 79.3 FL (ref 80–94)
MONOCYTES # BLD AUTO: 0.85 X10(3)/MCL (ref 0.1–1.3)
MONOCYTES NFR BLD AUTO: 9.5 %
NEUTROPHILS # BLD AUTO: 6.28 X10(3)/MCL (ref 2.1–9.2)
NEUTROPHILS NFR BLD AUTO: 70.4 %
NRBC BLD AUTO-RTO: 0 %
PLATELET # BLD AUTO: 259 X10(3)/MCL (ref 130–400)
PMV BLD AUTO: 9.1 FL (ref 7.4–10.4)
POTASSIUM SERPL-SCNC: 3.7 MMOL/L (ref 3.5–5.1)
PROT SERPL-MCNC: 6.4 GM/DL (ref 6.4–8.3)
RBC # BLD AUTO: 4.83 X10(6)/MCL (ref 4.7–6.1)
SODIUM SERPL-SCNC: 139 MMOL/L (ref 136–145)
TROPONIN I SERPL-MCNC: 0.11 NG/ML (ref 0–0.04)
WBC # BLD AUTO: 8.93 X10(3)/MCL (ref 4.5–11.5)

## 2025-05-10 PROCEDURE — 85025 COMPLETE CBC W/AUTO DIFF WBC: CPT | Performed by: NURSE PRACTITIONER

## 2025-05-10 PROCEDURE — 36415 COLL VENOUS BLD VENIPUNCTURE: CPT | Performed by: NURSE PRACTITIONER

## 2025-05-10 PROCEDURE — 27000221 HC OXYGEN, UP TO 24 HOURS

## 2025-05-10 PROCEDURE — 94760 N-INVAS EAR/PLS OXIMETRY 1: CPT

## 2025-05-10 PROCEDURE — 25000003 PHARM REV CODE 250: Performed by: NURSE PRACTITIONER

## 2025-05-10 PROCEDURE — 99900035 HC TECH TIME PER 15 MIN (STAT)

## 2025-05-10 PROCEDURE — 21400001 HC TELEMETRY ROOM

## 2025-05-10 PROCEDURE — 63600175 PHARM REV CODE 636 W HCPCS: Performed by: NURSE PRACTITIONER

## 2025-05-10 PROCEDURE — 80053 COMPREHEN METABOLIC PANEL: CPT | Performed by: NURSE PRACTITIONER

## 2025-05-10 PROCEDURE — 84484 ASSAY OF TROPONIN QUANT: CPT | Performed by: NURSE PRACTITIONER

## 2025-05-10 RX ORDER — METOPROLOL SUCCINATE 50 MG/1
100 TABLET, EXTENDED RELEASE ORAL DAILY
Status: DISCONTINUED | OUTPATIENT
Start: 2025-05-10 | End: 2025-05-11 | Stop reason: HOSPADM

## 2025-05-10 RX ORDER — FUROSEMIDE 10 MG/ML
40 INJECTION INTRAMUSCULAR; INTRAVENOUS 2 TIMES DAILY
Status: DISCONTINUED | OUTPATIENT
Start: 2025-05-10 | End: 2025-05-11 | Stop reason: HOSPADM

## 2025-05-10 RX ADMIN — HYDROCODONE BITARTRATE AND ACETAMINOPHEN 1 TABLET: 7.5; 325 TABLET ORAL at 12:05

## 2025-05-10 RX ADMIN — FUROSEMIDE 40 MG: 10 INJECTION, SOLUTION INTRAVENOUS at 05:05

## 2025-05-10 RX ADMIN — SPIRONOLACTONE 25 MG: 25 TABLET, FILM COATED ORAL at 08:05

## 2025-05-10 RX ADMIN — METOPROLOL SUCCINATE 100 MG: 50 TABLET, EXTENDED RELEASE ORAL at 12:05

## 2025-05-10 RX ADMIN — FUROSEMIDE 10 MG/HR: 10 INJECTION, SOLUTION INTRAMUSCULAR; INTRAVENOUS at 02:05

## 2025-05-10 RX ADMIN — MILRINONE LACTATE IN DEXTROSE 0.25 MCG/KG/MIN: 200 INJECTION, SOLUTION INTRAVENOUS at 10:05

## 2025-05-10 RX ADMIN — SACUBITRIL AND VALSARTAN 1 TABLET: 49; 51 TABLET, FILM COATED ORAL at 09:05

## 2025-05-10 RX ADMIN — SACUBITRIL AND VALSARTAN 1 TABLET: 49; 51 TABLET, FILM COATED ORAL at 12:05

## 2025-05-10 RX ADMIN — MILRINONE LACTATE IN DEXTROSE 0.25 MCG/KG/MIN: 200 INJECTION, SOLUTION INTRAVENOUS at 02:05

## 2025-05-10 NOTE — PROGRESS NOTES
[facility]    Cardiology  Progress Note    Patient Name: Michael Conti  MRN: [id number]  Admission Date: 5/7/2025  Code Status: Prior   Attending Provider: Tra Donovan MD   Primary Care Physician: Lele Verde NP (Inactive)  Principal Problem:<principal problem not specified>    Patient information was obtained from patient, past medical records, and ER records.     Subjective:   Chief Complaint:  SOB    HPI:   Mr. Conti is a 23y/o male, followed by Dr. Flynn/Jacquelin, who presented to ED with c/o worsening SOB and peripheral edema since [place] [facility] [place]. Admit /105, P 120, RR 24, O2 sat 98%. Workup revealed BUN/creatinine 24.6/1.74, BNP 7491, troponin 0.150. CXR revealing some pulmonary congestion. He has been treated with lasix and bipap and has been admitted to [facility]. Currently sitting up in chair on RA. Reports mild improvement in SOB    Hospital Course :  5.9.2025: Patient sitting up in bedside chair. Nurse states he had an episode of hypoxia and desaturated to the low 90's. Patient is currently on room air, NAD, denies CP, Palps and SOB.   5.10.25: Sitting in bedside chair. Creatinine stable. Remains on Primacor and lasix drip      PMH: CHF, HTN, PHTN, NICMO, ADHD, Morbid Obesity  PSH: RHC, LHC , [employer] single chamber ICD  Family History: Mother - Heart Failure   Social History: Denies illicit drug use, alcohol use, and smoking.      Previous Cardiac Diagnostics:   TTE 04.30.25:  LV xize dilated. LVEF 30% with global hypokinesis. LVEDD 8.02cm. RV global systolic function is moderately reduced. Normal mitral valve structure and motion. Moderate MR is present.    Venous US 03.18.25:  1. The study quality is average.   2. The veins of the left lower extremity compress easily and augment well with normal phasic flow and no evidence of deep venous thrombosis or arterio-venous fistula on this study.      ECHO (9.13.24):  Left Ventricle: The left  ventricle is severely dilated. Severe global hypokinesis present. There is severely reduced systolic function with a visually estimated ejection fraction of 10 -15%. There is diastolic dysfunction. Right Ventricle: Normal right ventricular cavity size. Systolic function is mildly reduced. Left Atrium: Left atrium is severely dilated. Right Atrium: Right atrium is moderately dilated. Mitral Valve: There is mild regurgitation. Tricuspid Valve: There is mild regurgitation with the RVSP of 35mmHg. Pulmonic Valve: There is mild regurgitation. IVC/SVC: Elevated venous pressure at 15 mmHg.     LHC (7.23.24):   Left Ventricle: The left ventricle is severely dilated. Moderately increased ventricular mass. Normal wall thickness. There is moderate eccentric hypertrophy. Global hypokinesis present. There is severely reduced systolic function with a visually estimated ejection fraction of 10 -15%. Ejection fraction by visual approximation is 15%. Grade II diastolic dysfunction. Possible thrombus in the apex that is non-mobile and only seen on contrast ( both Dr Simon and I think likely not but there is enough suspicion that we cannot exclude). Right Ventricle: Mild right ventricular enlargement. Wall thickness is normal. Systolic function is borderline low to slightly reduced despite the normal TAPSE. Left Atrium: Left atrium is severely dilated. Right Atrium: Right atrium is mildly dilated. Mitral Valve: There is mild to moderate regurgitation. Tricuspid Valve: There is mild regurgitation. Pulmonary Artery: There is mild pulmonary hypertension. The estimated pulmonary artery systolic pressure is 44 mmHg. IVC/SVC: Normal venous pressure at 3 mmHg.     RHC/LHC (2.29.24):  LMCA: Normal. LAD: Normal. LCX: Normal. RCA: Normal         Past Medical History:   Diagnosis Date    CHF (congestive heart failure)     Hypertension        Past Surgical History:   Procedure Laterality Date    CARDIAC CATHETERIZATION         Review of patient's  allergies indicates:  No Known Allergies    No current facility-administered medications on file prior to encounter.     Current Outpatient Medications on File Prior to Encounter   Medication Sig    FARXIGA 10 mg tablet Take 1 tablet (10 mg total) by mouth once daily.    isosorbide-hydrALAZINE 20-37.5 mg (BIDIL) 20-37.5 mg Tab Take 1 tablet by mouth 3 (three) times daily.    metoprolol succinate (TOPROL-XL) 100 MG 24 hr tablet Take 1 tablet (100 mg total) by mouth 2 (two) times daily.    torsemide (DEMADEX) 20 MG Tab Take 2 tablets (40 mg total) by mouth 2 (two) times a day.    nitroGLYCERIN (NITROSTAT) 0.4 MG SL tablet Place 0.4 mg under the tongue every 5 (five) minutes as needed.    potassium chloride (KLOR-CON) 10 MEQ TbSR Take 1 tablet (10 mEq total) by mouth once daily.    sacubitriL-valsartan (ENTRESTO) 49-51 mg per tablet Take 1 tablet by mouth 2 (two) times daily.    spironolactone (ALDACTONE) 25 MG tablet Take 1 tablet (25 mg total) by mouth once daily.    traZODone (DESYREL) 50 MG tablet Take 50 mg by mouth nightly as needed.     Family History    None       Tobacco Use    Smoking status: Never     Passive exposure: Never    Smokeless tobacco: Never   Substance and Sexual Activity    Alcohol use: Yes    Drug use: Never    Sexual activity: Not on file     Review of Systems   Cardiovascular:  Positive for dyspnea on exertion and orthopnea. Negative for chest pain.   Respiratory:  Positive for shortness of breath.    Skin: Negative.    Musculoskeletal: Negative.      Objective:     Vital Signs (Most Recent):  Temp: 97.8 °F (36.6 °C) (05/10/25 0803)  Pulse: 110 (05/10/25 0803)  Resp: 20 (05/10/25 0344)  BP: (!) 157/99 (05/10/25 0848)  SpO2: 95 % (05/10/25 0803) Vital Signs (24h Range):  Temp:  [97.3 °F (36.3 °C)-97.9 °F (36.6 °C)] 97.8 °F (36.6 °C)  Pulse:  [101-113] 110  Resp:  [18-22] 20  SpO2:  [92 %-100 %] 95 %  BP: (122-159)/() 157/99     Weight: (!) 166.1 kg (366 lb 2.9 oz)  Body mass index is  52.54 kg/m².    SpO2: 95 %         Intake/Output Summary (Last 24 hours) at 5/10/2025 1153  Last data filed at 5/10/2025 0546  Gross per 24 hour   Intake 840 ml   Output 3500 ml   Net -2660 ml       Lines/Drains/Airways       Peripheral Intravenous Line  Duration                  Peripheral IV - Single Lumen 05/08/25 1123 18 G 2 1/4 in Anterior;Right Upper Arm 2 days         Peripheral IV - Single Lumen 05/08/25 2200 20 G Distal;Left;Posterior Upper Arm 1 day                    Physical Exam  Constitutional:       Appearance: He is obese.   Cardiovascular:      Rate and Rhythm: Regular rhythm. Tachycardia present.      Pulses: Normal pulses.      Heart sounds: Normal heart sounds.   Pulmonary:      Effort: Pulmonary effort is normal.      Breath sounds: Normal breath sounds.   Abdominal:      Palpations: Abdomen is soft.   Musculoskeletal:      Right lower leg: Edema present.      Left lower leg: Edema present.   Skin:     General: Skin is warm.      Capillary Refill: Capillary refill takes less than 2 seconds.   Neurological:      General: No focal deficit present.      Mental Status: He is alert.         Home Medications:   Medications Ordered Prior to Encounter[1]  Current Schedule Inpatient Medications:   spironolactone  25 mg Oral Daily     Continuous Infusions:   furosemide (LASIX) 2 mg/mL continuous infusion (non-titrating)  10 mg/hr Intravenous Continuous 5 mL/hr at 05/10/25 0240 10 mg/hr at 05/10/25 0240    milrinone  0.25 mcg/kg/min Intravenous Continuous 12.5 mL/hr at 05/10/25 1013 0.25 mcg/kg/min at 05/10/25 1013     Significant Labs:   Chemistries:   Recent Labs   Lab 05/07/25  2033 05/08/25  0926 05/09/25  0604 05/10/25  0533     --  142 139   K 4.3  --  3.6 3.7     --  100 104   CO2 21*  --  28 25   BUN 24.6*  --  25.3* 19.0   CREATININE 1.74*  --  1.61* 1.26*   CALCIUM 8.5  --  8.3* 8.6   PROT 7.3  --  6.6 6.4   BILITOT 0.9  --  0.9 0.9   ALKPHOS 91  --  99 93   ALT 21  --  17 16   AST 40  " --  17 22   MG 1.80  --   --   --    TROPONINI 0.150* 0.119*  --  0.109*        CBC/Anemia Labs: Coags:    Recent Labs   Lab 05/08/25  0926 05/09/25  0604 05/10/25  0533   WBC 8.71 7.93 8.93   HGB 11.5* 12.3* 11.9*   HCT 36.2* 40.0* 38.3*    265 259   MCV 78.7* 80.0 79.3*   RDW 15.9 16.1 16.1    No results for input(s): "PT", "INR", "APTT" in the last 168 hours.     Significant Imaging: EXAMINATION:  XR CHEST PA AND LATERAL     CLINICAL HISTORY:  , Chest pain, unspecified.     FINDINGS:  No alveolar consolidation, effusion, or pneumothorax is seen.   The thoracic aorta is normal  cardiac silhouette, is enlarged central pulmonary vessels and mediastinum are normal in size and are grossly unremarkable.   visualized osseous structures are grossly unremarkable.     Pacer pack with tips in the right ventricle     Impression:     No acute chest disease is identified..     Cardiomegaly        Electronically signed by:Abdirizak Verde  Date:                                            05/08/2025  Time:                                           09:17    Assessment:   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   --BNP 7491  NSTEMI type II s/t HF exacerbation   --Troponin peaked at 0.150  NICMO  -- TTE 04.30.25: EF 30% (improved from 10-15% 11.2024)  -- [facility] (2.29.24): Normal Coronaries   --[employer] Single chamber ICD  CKD III  --Creatinine 1.74  HHD  PHTN  VHD    - Mild MR/TR  ADHD  Morbid Obesity  No known history of GI Bleed     Plan:   DC lasix and primacor drip  Start Lasix 40mg IVP bid  Strict I&Os/daily weights  Resume Toprol 100 mg daily and Entresto 49-51 mg bid  Outpatient sleep study  CMP, CBC, in am            YOGESH KochP  Cardiology  [facility]   Physician addendum:  I have seen and examined this patient as a split-shared visit with the MARTINEZ d/t complicated medical management of above problems written in assessment and high acuity requiring " physician expertise in medical decision-making. I performed the substantive portion of the history and exam. Above medical decision-making is also formulated by me.    Cardiovascular exam:  S1, S2  Lungs:  fine crackles at bases.  Extremities:  + edema bilaterally    Plan:  Medications as above.  Continue supportive therapy.     Micky Oquendo MD  Cardiologist         [1]   No current facility-administered medications on file prior to encounter.     Current Outpatient Medications on File Prior to Encounter   Medication Sig Dispense Refill    FARXIGA 10 mg tablet Take 1 tablet (10 mg total) by mouth once daily. 90 tablet 2    isosorbide-hydrALAZINE 20-37.5 mg (BIDIL) 20-37.5 mg Tab Take 1 tablet by mouth 3 (three) times daily. 270 tablet 2    metoprolol succinate (TOPROL-XL) 100 MG 24 hr tablet Take 1 tablet (100 mg total) by mouth 2 (two) times daily. 180 tablet 2    torsemide (DEMADEX) 20 MG Tab Take 2 tablets (40 mg total) by mouth 2 (two) times a day. 180 tablet 2    nitroGLYCERIN (NITROSTAT) 0.4 MG SL tablet Place 0.4 mg under the tongue every 5 (five) minutes as needed.      potassium chloride (KLOR-CON) 10 MEQ TbSR Take 1 tablet (10 mEq total) by mouth once daily. 90 tablet 2    sacubitriL-valsartan (ENTRESTO) 49-51 mg per tablet Take 1 tablet by mouth 2 (two) times daily. 180 tablet 2    spironolactone (ALDACTONE) 25 MG tablet Take 1 tablet (25 mg total) by mouth once daily. 90 tablet 2    traZODone (DESYREL) 50 MG tablet Take 50 mg by mouth nightly as needed.

## 2025-05-11 VITALS
SYSTOLIC BLOOD PRESSURE: 169 MMHG | HEART RATE: 107 BPM | RESPIRATION RATE: 21 BRPM | HEIGHT: 70 IN | OXYGEN SATURATION: 100 % | TEMPERATURE: 97 F | BODY MASS INDEX: 45.1 KG/M2 | WEIGHT: 315 LBS | DIASTOLIC BLOOD PRESSURE: 98 MMHG

## 2025-05-11 LAB
ALBUMIN SERPL-MCNC: 3.5 G/DL (ref 3.5–5)
ALBUMIN/GLOB SERPL: 1 RATIO (ref 1.1–2)
ALP SERPL-CCNC: 107 UNIT/L (ref 40–150)
ALT SERPL-CCNC: 16 UNIT/L (ref 0–55)
ANION GAP SERPL CALC-SCNC: 15 MEQ/L
AST SERPL-CCNC: 20 UNIT/L (ref 11–45)
BASOPHILS # BLD AUTO: 0.04 X10(3)/MCL
BASOPHILS NFR BLD AUTO: 0.5 %
BILIRUB SERPL-MCNC: 0.9 MG/DL
BUN SERPL-MCNC: 19 MG/DL (ref 8.9–20.6)
CALCIUM SERPL-MCNC: 9.3 MG/DL (ref 8.4–10.2)
CHLORIDE SERPL-SCNC: 104 MMOL/L (ref 98–107)
CO2 SERPL-SCNC: 24 MMOL/L (ref 22–29)
CREAT SERPL-MCNC: 1.38 MG/DL (ref 0.72–1.25)
CREAT/UREA NIT SERPL: 14
EOSINOPHIL # BLD AUTO: 0.06 X10(3)/MCL (ref 0–0.9)
EOSINOPHIL NFR BLD AUTO: 0.7 %
ERYTHROCYTE [DISTWIDTH] IN BLOOD BY AUTOMATED COUNT: 17.1 % (ref 11.5–17)
GFR SERPLBLD CREATININE-BSD FMLA CKD-EPI: >60 ML/MIN/1.73/M2
GLOBULIN SER-MCNC: 3.5 GM/DL (ref 2.4–3.5)
GLUCOSE SERPL-MCNC: 102 MG/DL (ref 74–100)
HCT VFR BLD AUTO: 45.3 % (ref 42–52)
HGB BLD-MCNC: 13.7 G/DL (ref 14–18)
IMM GRANULOCYTES # BLD AUTO: 0.02 X10(3)/MCL (ref 0–0.04)
IMM GRANULOCYTES NFR BLD AUTO: 0.2 %
LYMPHOCYTES # BLD AUTO: 2 X10(3)/MCL (ref 0.6–4.6)
LYMPHOCYTES NFR BLD AUTO: 22.5 %
MCH RBC QN AUTO: 24.1 PG (ref 27–31)
MCHC RBC AUTO-ENTMCNC: 30.2 G/DL (ref 33–36)
MCV RBC AUTO: 79.6 FL (ref 80–94)
MONOCYTES # BLD AUTO: 0.76 X10(3)/MCL (ref 0.1–1.3)
MONOCYTES NFR BLD AUTO: 8.6 %
NEUTROPHILS # BLD AUTO: 6 X10(3)/MCL (ref 2.1–9.2)
NEUTROPHILS NFR BLD AUTO: 67.5 %
NRBC BLD AUTO-RTO: 0 %
PLATELET # BLD AUTO: 283 X10(3)/MCL (ref 130–400)
PMV BLD AUTO: 9 FL (ref 7.4–10.4)
POCT GLUCOSE: 135 MG/DL (ref 70–110)
POTASSIUM SERPL-SCNC: 4.2 MMOL/L (ref 3.5–5.1)
PROT SERPL-MCNC: 7 GM/DL (ref 6.4–8.3)
RBC # BLD AUTO: 5.69 X10(6)/MCL (ref 4.7–6.1)
SODIUM SERPL-SCNC: 143 MMOL/L (ref 136–145)
WBC # BLD AUTO: 8.88 X10(3)/MCL (ref 4.5–11.5)

## 2025-05-11 PROCEDURE — 25000003 PHARM REV CODE 250: Performed by: NURSE PRACTITIONER

## 2025-05-11 PROCEDURE — 63600175 PHARM REV CODE 636 W HCPCS: Performed by: NURSE PRACTITIONER

## 2025-05-11 PROCEDURE — 36415 COLL VENOUS BLD VENIPUNCTURE: CPT | Performed by: NURSE PRACTITIONER

## 2025-05-11 PROCEDURE — 85025 COMPLETE CBC W/AUTO DIFF WBC: CPT | Performed by: NURSE PRACTITIONER

## 2025-05-11 PROCEDURE — 80053 COMPREHEN METABOLIC PANEL: CPT | Performed by: NURSE PRACTITIONER

## 2025-05-11 RX ORDER — TORSEMIDE 100 MG/1
100 TABLET ORAL DAILY
Status: ON HOLD | COMMUNITY
Start: 2025-04-29

## 2025-05-11 RX ORDER — SPIRONOLACTONE 25 MG/1
25 TABLET ORAL DAILY
Qty: 90 TABLET | Refills: 2 | Status: ON HOLD | OUTPATIENT
Start: 2025-05-11

## 2025-05-11 RX ORDER — VERICIGUAT 2.5 MG/1
2.5 TABLET, FILM COATED ORAL DAILY
Qty: 30 TABLET | Refills: 11 | Status: ON HOLD | OUTPATIENT
Start: 2025-05-11

## 2025-05-11 RX ADMIN — METOPROLOL SUCCINATE 100 MG: 50 TABLET, EXTENDED RELEASE ORAL at 09:05

## 2025-05-11 RX ADMIN — SPIRONOLACTONE 25 MG: 25 TABLET, FILM COATED ORAL at 09:05

## 2025-05-11 RX ADMIN — SACUBITRIL AND VALSARTAN 1 TABLET: 49; 51 TABLET, FILM COATED ORAL at 09:05

## 2025-05-11 RX ADMIN — FUROSEMIDE 40 MG: 10 INJECTION, SOLUTION INTRAVENOUS at 09:05

## 2025-05-11 NOTE — DISCHARGE SUMMARY
[facility] - [facility] Medical Telemetry  Cardiology  Discharge Summary      Patient Name: Michael Conti  MRN: [id number]  Admission Date: 5/7/2025  Hospital Length of Stay: 4 days  Discharge Date and Time: 05/11/2025 12:44 PM  Attending Physician: Tra Donovan MD    Discharging Provider: ANISH Koch  Primary Care Physician: Lele Verde NP (Inactive)    HPI:   Mr. Conti is a 23y/o male, followed by Dr. Flynn/Jacquelin, who presented to ED with c/o worsening SOB and peripheral edema since [place] [facility] [place]. Admit /105, P 120, RR 24, O2 sat 98%. Workup revealed BUN/creatinine 24.6/1.74, BNP 7491, troponin 0.150. CXR revealing some pulmonary congestion. He has been treated with lasix and bipap and has been admitted to [facility]. Currently sitting up in chair on RA. Reports mild improvement in SOB     Hospital Course :  5.9.2025: Patient sitting up in bedside chair. Nurse states he had an episode of hypoxia and desaturated to the low 90's. Patient is currently on room air, NAD, denies CP, Palps and SOB.   5.10.25: Sitting in bedside chair. Creatinine stable. Remains on Primacor and lasix drip  5.11.25 Symptomatically improved. VSS> Renal indices stable     Physical Exam   Cardiovascular: Normal rate, regular rhythm, normal heart sounds and normal pulses.   Abdominal: Soft.   Musculoskeletal:      Right lower leg: Edema present.      Left lower leg: Edema present.   Neurological: He is alert.   Skin: Skin is warm. Capillary refill takes less than 2 seconds.   Psychiatric: Mood normal.         Significant Diagnostic Studies: Labs: BMP:   Recent Labs   Lab 05/10/25  0533 05/11/25  0725   GLU 85 102*    143   K 3.7 4.2    104   CO2 25 24   BUN 19.0 19.0   CREATININE 1.26* 1.38*   CALCIUM 8.6 9.3   , CMP   Recent Labs   Lab 05/10/25  0533 05/11/25  0725    143   K 3.7 4.2    104   CO2 25 24   GLU 85 102*   BUN 19.0 19.0   CREATININE 1.26* 1.38*   CALCIUM 8.6 9.3    PROT 6.4 7.0   ALBUMIN 3.3* 3.5   BILITOT 0.9 0.9   ALKPHOS 93 107   AST 22 20   ALT 16 16   , Troponin   Recent Labs   Lab 05/07/25 2033 05/08/25  0926 05/10/25  0533   TROPONINI 0.150* 0.119* 0.109*   , and A1C:   Recent Labs   Lab 11/17/24  0623   HGBA1C 5.6     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   --BNP 7491  NSTEMI type II s/t HF exacerbation   --Troponin peaked at 0.150  NICMO  -- TTE 04.30.25: EF 30% (improved from 10-15% 11.2024)  -- [facility] (2.29.24): Normal Coronaries   --"[employer] Single chamber ICD  CKD III  --Creatinine 1.74  HHD  PHTN  VHD    - Mild MR/TR  ADHD  Morbid Obesity  No known history of GI Bleed     Plan:  DC Lasix  Resume home med torsemide 100 mg daily  Continue Toprol 100 mg daily, Entresto 49-51 mg bid, spironolactone 25mg daily  Add Verquvo 2.5mg daily  Outpatient sleep study    Obtain CMP in 1 week    Discharged Condition: stable    Disposition: Home or Self Care    Follow Up:    Patient Instructions:      Diet Cardiac     Notify your health care provider if you experience any of the following:  difficulty breathing or increased cough     Activity as tolerated     Medications:  Reconciled Home Medications:      Medication List        CONTINUE taking these medications      ENTRESTO 49-51 mg per tablet  Generic drug: sacubitriL-valsartan  Take 1 tablet by mouth 2 (two) times daily.     FARXIGA 10 mg tablet  Generic drug: dapagliflozin propanediol  Take 1 tablet (10 mg total) by mouth once daily.     isosorbide-hydrALAZINE 20-37.5 mg 20-37.5 mg Tab  Commonly known as: BIDIL  Take 1 tablet by mouth 3 (three) times daily.     metoprolol succinate 100 MG 24 hr tablet  Commonly known as: TOPROL-XL  Take 1 tablet (100 mg total) by mouth 2 (two) times daily.     nitroGLYCERIN 0.4 MG SL tablet  Commonly known as: NITROSTAT  Place 0.4 mg under the tongue every 5 (five) minutes as needed.     spironolactone 25 MG tablet  Commonly known as: ALDACTONE  Take 1 tablet  (25 mg total) by mouth once daily.     torsemide 100 MG Tab  Commonly known as: DEMADEX  Take 100 mg by mouth once daily.     traZODone 50 MG tablet  Commonly known as: DESYREL  Take 50 mg by mouth nightly as needed.              Time spent on the discharge of patient: 35 minutes    ANISH Koch  Cardiology  [facility] - [facility] Medical Telemetry    Physician addendum:  I have seen and examined this patient as a split-shared visit with the MARTINEZ d/t complicated medical management of above problems written in assessment and high acuity requiring physician expertise in medical decision-making. I performed the substantive portion of the history and exam. Above medical decision-making is also formulated by me.    Pt. Would like to be discharged.   Plan:   Medications reviewed. Discharge to home. Answered all questions prior to discharge.   F/U scheduled as above.     Micky Oquendo MD  Cardiologist

## 2025-05-11 NOTE — NURSING
D/C instructions and AVS provided to and reviewed w/ patient, questions answered and verbalizes understanding. IV x2 and tele removed, denies pain or discomfort. D/C floor via W/C transport to home via [employer].

## 2025-05-11 NOTE — PLAN OF CARE
05/11/25 1259   Final Note   Assessment Type Discharge Planning Assessment   Anticipated Discharge Disposition Home   Hospital Resources/Appts/Education Provided Appointments scheduled and added to AVS   Post-Acute Status   Discharge Delays None known at this time

## 2025-05-11 NOTE — PLAN OF CARE
Problem: Adult Inpatient Plan of Care  Goal: Plan of Care Review  Outcome: Met  Goal: Patient-Specific Goal (Individualized)  Outcome: Met  Goal: Absence of Hospital-Acquired Illness or Injury  Outcome: Met  Goal: Optimal Comfort and Wellbeing  Outcome: Met  Goal: Readiness for Transition of Care  Outcome: Met     Problem: Bariatric Environmental Safety  Goal: Safety Maintained with Care  Outcome: Met     Problem: Wound  Goal: Optimal Coping  Outcome: Met  Goal: Optimal Functional Ability  Outcome: Met  Goal: Absence of Infection Signs and Symptoms  Outcome: Met  Goal: Improved Oral Intake  Outcome: Met  Goal: Optimal Pain Control and Function  Outcome: Met  Goal: Skin Health and Integrity  Outcome: Met  Goal: Optimal Wound Healing  Outcome: Met     Problem: Fall Injury Risk  Goal: Absence of Fall and Fall-Related Injury  Outcome: Met

## 2025-05-13 ENCOUNTER — HOSPITAL ENCOUNTER (INPATIENT)
Facility: HOSPITAL | Age: 25
LOS: 5 days | Discharge: HOME | DRG: 291 | End: 2025-05-18
Attending: EMERGENCY MEDICINE | Admitting: EMERGENCY MEDICINE
Payer: MEDICAID

## 2025-05-13 DIAGNOSIS — I50.43 ACUTE ON CHRONIC COMBINED SYSTOLIC (CONGESTIVE) AND DIASTOLIC (CONGESTIVE) HEART FAILURE: ICD-10-CM

## 2025-05-13 DIAGNOSIS — I50.9 HEART FAILURE: ICD-10-CM

## 2025-05-13 DIAGNOSIS — R06.02 SOB (SHORTNESS OF BREATH): ICD-10-CM

## 2025-05-13 DIAGNOSIS — I50.43 ACUTE ON CHRONIC COMBINED SYSTOLIC AND DIASTOLIC CONGESTIVE HEART FAILURE: Primary | ICD-10-CM

## 2025-05-13 DIAGNOSIS — R29.6 FALLS: ICD-10-CM

## 2025-05-13 DIAGNOSIS — R07.9 CHEST PAIN: ICD-10-CM

## 2025-05-13 PROBLEM — I51.7 LEFT VENTRICULAR HYPERTROPHY: Status: ACTIVE | Noted: 2025-02-24

## 2025-05-13 PROBLEM — I21.4 ACUTE NON-ST SEGMENT ELEVATION MYOCARDIAL INFARCTION: Status: RESOLVED | Noted: 2025-05-13 | Resolved: 2025-05-13

## 2025-05-13 PROBLEM — R79.89 ELEVATED SERUM CREATININE: Status: ACTIVE | Noted: 2025-05-13

## 2025-05-13 PROBLEM — I16.1 HYPERTENSIVE EMERGENCY: Status: RESOLVED | Noted: 2025-05-13 | Resolved: 2025-05-13

## 2025-05-13 PROBLEM — F90.9 ATTENTION DEFICIT HYPERACTIVITY DISORDER: Status: ACTIVE | Noted: 2025-02-24

## 2025-05-13 LAB
ABSOLUTE EOSINOPHIL (OHS): 0.02 K/UL
ABSOLUTE MONOCYTE (OHS): 0.64 K/UL (ref 0.3–1)
ABSOLUTE NEUTROPHIL COUNT (OHS): 5.58 K/UL (ref 1.8–7.7)
ALBUMIN SERPL BCP-MCNC: 3.2 G/DL (ref 3.5–5.2)
ALP SERPL-CCNC: 98 UNIT/L (ref 40–150)
ALT SERPL W/O P-5'-P-CCNC: 21 UNIT/L (ref 10–44)
ANION GAP (OHS): 10 MMOL/L (ref 8–16)
AST SERPL-CCNC: 27 UNIT/L (ref 11–45)
BACTERIA #/AREA URNS AUTO: NORMAL /HPF
BASOPHILS # BLD AUTO: 0.04 K/UL
BASOPHILS NFR BLD AUTO: 0.5 %
BILIRUB SERPL-MCNC: 0.9 MG/DL (ref 0.1–1)
BILIRUB UR QL STRIP.AUTO: NEGATIVE
BNP SERPL-MCNC: >4900 PG/ML (ref 0–99)
BUN SERPL-MCNC: 28 MG/DL (ref 6–20)
CALCIUM SERPL-MCNC: 9.1 MG/DL (ref 8.7–10.5)
CHLORIDE SERPL-SCNC: 106 MMOL/L (ref 95–110)
CLARITY UR: CLEAR
CO2 SERPL-SCNC: 21 MMOL/L (ref 23–29)
COLOR UR AUTO: YELLOW
CREAT SERPL-MCNC: 1.7 MG/DL (ref 0.5–1.4)
CREAT UR-MCNC: 39 MG/DL (ref 23–375)
EAG (OHS): 128 MG/DL (ref 68–131)
ERYTHROCYTE [DISTWIDTH] IN BLOOD BY AUTOMATED COUNT: 15.9 % (ref 11.5–14.5)
GFR SERPLBLD CREATININE-BSD FMLA CKD-EPI: 57 ML/MIN/1.73/M2
GLUCOSE SERPL-MCNC: 114 MG/DL (ref 70–110)
GLUCOSE UR QL STRIP: NEGATIVE
HBA1C MFR BLD: 6.1 % (ref 4–5.6)
HCT VFR BLD AUTO: 36.4 % (ref 40–54)
HGB BLD-MCNC: 11.7 GM/DL (ref 14–18)
HGB UR QL STRIP: NEGATIVE
HOLD SPECIMEN: NORMAL
HYALINE CASTS UR QL AUTO: 0 /LPF (ref 0–1)
IMM GRANULOCYTES # BLD AUTO: 0.05 K/UL (ref 0–0.04)
IMM GRANULOCYTES NFR BLD AUTO: 0.6 % (ref 0–0.5)
KETONES UR QL STRIP: NEGATIVE
LACTATE SERPL-SCNC: 1 MMOL/L (ref 0.5–2.2)
LEUKOCYTE ESTERASE UR QL STRIP: NEGATIVE
LYMPHOCYTES # BLD AUTO: 1.59 K/UL (ref 1–4.8)
MAGNESIUM SERPL-MCNC: 2 MG/DL (ref 1.6–2.6)
MCH RBC QN AUTO: 24.6 PG (ref 27–31)
MCHC RBC AUTO-ENTMCNC: 32.1 G/DL (ref 32–36)
MCV RBC AUTO: 77 FL (ref 82–98)
MICROSCOPIC COMMENT: NORMAL
NITRITE UR QL STRIP: NEGATIVE
NUCLEATED RBC (/100WBC) (OHS): 0 /100 WBC
OHS QRS DURATION: 90 MS
OHS QTC CALCULATION: 458 MS
PH UR STRIP: 7 [PH]
PLATELET # BLD AUTO: 266 K/UL (ref 150–450)
PMV BLD AUTO: 9.4 FL (ref 9.2–12.9)
POTASSIUM SERPL-SCNC: 3.8 MMOL/L (ref 3.5–5.1)
PROT SERPL-MCNC: 6.5 GM/DL (ref 6–8.4)
PROT UR QL STRIP: ABNORMAL
PROT UR-MCNC: 101 MG/DL
PROT/CREAT UR: 2.59 MG/G{CREAT}
RBC # BLD AUTO: 4.75 M/UL (ref 4.6–6.2)
RBC #/AREA URNS AUTO: 1 /HPF (ref 0–4)
RELATIVE EOSINOPHIL (OHS): 0.3 %
RELATIVE LYMPHOCYTE (OHS): 20.1 % (ref 18–48)
RELATIVE MONOCYTE (OHS): 8.1 % (ref 4–15)
RELATIVE NEUTROPHIL (OHS): 70.4 % (ref 38–73)
SODIUM SERPL-SCNC: 137 MMOL/L (ref 136–145)
SP GR UR STRIP: 1
T4 FREE SERPL-MCNC: 1.35 NG/DL (ref 0.71–1.51)
TROPONIN I SERPL HS-MCNC: 1759 NG/L
TROPONIN I SERPL HS-MCNC: 1790 NG/L
TSH SERPL-ACNC: 1.97 UIU/ML (ref 0.4–4)
UROBILINOGEN UR STRIP-ACNC: NEGATIVE EU/DL
WBC # BLD AUTO: 7.92 K/UL (ref 3.9–12.7)
WBC #/AREA URNS AUTO: <1 /HPF (ref 0–5)

## 2025-05-13 PROCEDURE — 85025 COMPLETE CBC W/AUTO DIFF WBC: CPT | Performed by: STUDENT IN AN ORGANIZED HEALTH CARE EDUCATION/TRAINING PROGRAM

## 2025-05-13 PROCEDURE — 63600175 PHARM REV CODE 636 W HCPCS: Performed by: HOSPITALIST

## 2025-05-13 PROCEDURE — 82570 ASSAY OF URINE CREATININE: CPT | Performed by: HOSPITALIST

## 2025-05-13 PROCEDURE — 81003 URINALYSIS AUTO W/O SCOPE: CPT | Performed by: HOSPITALIST

## 2025-05-13 PROCEDURE — 84443 ASSAY THYROID STIM HORMONE: CPT | Performed by: HOSPITALIST

## 2025-05-13 PROCEDURE — 93010 ELECTROCARDIOGRAM REPORT: CPT | Mod: ,,, | Performed by: INTERNAL MEDICINE

## 2025-05-13 PROCEDURE — 84484 ASSAY OF TROPONIN QUANT: CPT | Performed by: STUDENT IN AN ORGANIZED HEALTH CARE EDUCATION/TRAINING PROGRAM

## 2025-05-13 PROCEDURE — 84439 ASSAY OF FREE THYROXINE: CPT | Performed by: HOSPITALIST

## 2025-05-13 PROCEDURE — 82040 ASSAY OF SERUM ALBUMIN: CPT | Performed by: STUDENT IN AN ORGANIZED HEALTH CARE EDUCATION/TRAINING PROGRAM

## 2025-05-13 PROCEDURE — 12000002 HC ACUTE/MED SURGE SEMI-PRIVATE ROOM

## 2025-05-13 PROCEDURE — 83036 HEMOGLOBIN GLYCOSYLATED A1C: CPT | Performed by: HOSPITALIST

## 2025-05-13 PROCEDURE — 96374 THER/PROPH/DIAG INJ IV PUSH: CPT

## 2025-05-13 PROCEDURE — 99285 EMERGENCY DEPT VISIT HI MDM: CPT | Mod: 25

## 2025-05-13 PROCEDURE — 83605 ASSAY OF LACTIC ACID: CPT

## 2025-05-13 PROCEDURE — 63600175 PHARM REV CODE 636 W HCPCS

## 2025-05-13 PROCEDURE — 83880 ASSAY OF NATRIURETIC PEPTIDE: CPT | Performed by: STUDENT IN AN ORGANIZED HEALTH CARE EDUCATION/TRAINING PROGRAM

## 2025-05-13 PROCEDURE — 83735 ASSAY OF MAGNESIUM: CPT | Performed by: STUDENT IN AN ORGANIZED HEALTH CARE EDUCATION/TRAINING PROGRAM

## 2025-05-13 PROCEDURE — 20600001 HC STEP DOWN PRIVATE ROOM

## 2025-05-13 PROCEDURE — 94761 N-INVAS EAR/PLS OXIMETRY MLT: CPT

## 2025-05-13 PROCEDURE — 93005 ELECTROCARDIOGRAM TRACING: CPT

## 2025-05-13 RX ORDER — NITROGLYCERIN 0.4 MG/1
0.4 TABLET SUBLINGUAL EVERY 5 MIN PRN
Status: DISCONTINUED | OUTPATIENT
Start: 2025-05-13 | End: 2025-05-18 | Stop reason: HOSPADM

## 2025-05-13 RX ORDER — ACETAMINOPHEN 325 MG/1
650 TABLET ORAL EVERY 4 HOURS PRN
Status: DISCONTINUED | OUTPATIENT
Start: 2025-05-13 | End: 2025-05-18 | Stop reason: HOSPADM

## 2025-05-13 RX ORDER — POLYETHYLENE GLYCOL 3350 17 G/17G
17 POWDER, FOR SOLUTION ORAL DAILY PRN
Status: DISCONTINUED | OUTPATIENT
Start: 2025-05-13 | End: 2025-05-18 | Stop reason: HOSPADM

## 2025-05-13 RX ORDER — TALC
6 POWDER (GRAM) TOPICAL NIGHTLY PRN
Status: DISCONTINUED | OUTPATIENT
Start: 2025-05-13 | End: 2025-05-18 | Stop reason: HOSPADM

## 2025-05-13 RX ORDER — GUAIFENESIN AND DEXTROMETHORPHAN HYDROBROMIDE 10; 100 MG/5ML; MG/5ML
10 SYRUP ORAL EVERY 4 HOURS PRN
Status: DISCONTINUED | OUTPATIENT
Start: 2025-05-13 | End: 2025-05-18 | Stop reason: HOSPADM

## 2025-05-13 RX ORDER — ALUMINUM HYDROXIDE, MAGNESIUM HYDROXIDE, AND SIMETHICONE 1200; 120; 1200 MG/30ML; MG/30ML; MG/30ML
30 SUSPENSION ORAL 4 TIMES DAILY PRN
Status: DISCONTINUED | OUTPATIENT
Start: 2025-05-13 | End: 2025-05-18 | Stop reason: HOSPADM

## 2025-05-13 RX ORDER — FUROSEMIDE 10 MG/ML
80 INJECTION INTRAMUSCULAR; INTRAVENOUS
Status: COMPLETED | OUTPATIENT
Start: 2025-05-13 | End: 2025-05-13

## 2025-05-13 RX ORDER — FUROSEMIDE 10 MG/ML
80 INJECTION INTRAMUSCULAR; INTRAVENOUS 2 TIMES DAILY
Status: DISCONTINUED | OUTPATIENT
Start: 2025-05-14 | End: 2025-05-14

## 2025-05-13 RX ORDER — DAPAGLIFLOZIN 10 MG/1
10 TABLET, FILM COATED ORAL DAILY
Status: DISCONTINUED | OUTPATIENT
Start: 2025-05-14 | End: 2025-05-13

## 2025-05-13 RX ORDER — ONDANSETRON HYDROCHLORIDE 2 MG/ML
4 INJECTION, SOLUTION INTRAVENOUS EVERY 8 HOURS PRN
Status: DISCONTINUED | OUTPATIENT
Start: 2025-05-13 | End: 2025-05-18 | Stop reason: HOSPADM

## 2025-05-13 RX ORDER — TRAZODONE HYDROCHLORIDE 50 MG/1
50 TABLET ORAL NIGHTLY PRN
Status: DISCONTINUED | OUTPATIENT
Start: 2025-05-13 | End: 2025-05-18 | Stop reason: HOSPADM

## 2025-05-13 RX ORDER — AMOXICILLIN 250 MG
1 CAPSULE ORAL 2 TIMES DAILY PRN
Status: DISCONTINUED | OUTPATIENT
Start: 2025-05-13 | End: 2025-05-18 | Stop reason: HOSPADM

## 2025-05-13 RX ORDER — ENOXAPARIN SODIUM 100 MG/ML
60 INJECTION SUBCUTANEOUS EVERY 12 HOURS
Status: DISCONTINUED | OUTPATIENT
Start: 2025-05-13 | End: 2025-05-18 | Stop reason: HOSPADM

## 2025-05-13 RX ORDER — SODIUM CHLORIDE 0.9 % (FLUSH) 0.9 %
10 SYRINGE (ML) INJECTION
Status: DISCONTINUED | OUTPATIENT
Start: 2025-05-13 | End: 2025-05-18 | Stop reason: HOSPADM

## 2025-05-13 RX ORDER — PROCHLORPERAZINE EDISYLATE 5 MG/ML
5 INJECTION INTRAMUSCULAR; INTRAVENOUS EVERY 6 HOURS PRN
Status: DISCONTINUED | OUTPATIENT
Start: 2025-05-13 | End: 2025-05-14

## 2025-05-13 RX ORDER — NALOXONE HCL 0.4 MG/ML
0.02 VIAL (ML) INJECTION
Status: DISCONTINUED | OUTPATIENT
Start: 2025-05-13 | End: 2025-05-18 | Stop reason: HOSPADM

## 2025-05-13 RX ORDER — METOPROLOL SUCCINATE 100 MG/1
100 TABLET, EXTENDED RELEASE ORAL 2 TIMES DAILY
Status: DISCONTINUED | OUTPATIENT
Start: 2025-05-14 | End: 2025-05-18 | Stop reason: HOSPADM

## 2025-05-13 RX ADMIN — ONDANSETRON 4 MG: 2 INJECTION INTRAMUSCULAR; INTRAVENOUS at 09:05

## 2025-05-13 RX ADMIN — FUROSEMIDE 80 MG: 10 INJECTION, SOLUTION INTRAVENOUS at 05:05

## 2025-05-13 NOTE — FIRST PROVIDER EVALUATION
Medical screening examination initiated.  I have conducted a focused provider triage encounter, findings are as follows:    Brief history of present illness:  25 yo M w/combined HFrEF (10-15% EF) HFpEF, w/AICD [place] single chamber, CKD, presenting w/dyspnea, orthopnea, intermittent nonexertional chest pain x 3 days, discharged from [facility] a few days ago from stay for fluid overload,  discharged with torsemide, GDMT, lasix discontinued, added verquvo.     Vitals:    05/13/25 1424   BP: (!) 206/109   BP Location: Right arm   Pulse: (!) 122   Resp: 20   Temp: 97.4 °F (36.3 °C)   TempSrc: Oral   SpO2: 95%   Weight: (!) 163.3 kg (360 lb)       Pertinent physical exam:      Gen: NAD  Neuro: AAOx4, ambulation at baseline  HEENT: no scleral icterus  CVS: RRR  Resp: mild tachypnea, no significant accessory muscle use  Abd: nontender    Brief workup plan:  cardiac work up, diuresis as needed, CXR    Preliminary workup initiated; this workup will be continued and followed by the physician or advanced practice provider that is assigned to the patient when roomed.

## 2025-05-13 NOTE — ED PROVIDER NOTES
Encounter Date: 5/13/2025       History     Chief Complaint   Patient presents with    Shortness of Breath     EF of 10%, defibrillator, recent ICU discharge from [facility]       Michael Conti, 25 y/o M w/PMHx of HFrEF (EF 10-15%) w/AICD, and CKD presenting with SOB. Patient recently discharged from [facility] (5/9-5/11) after being admitted for heart failure exacerbation. He was initially diuresed with IV Lasix and IV milrinone drip. He was successfully diuresed and discharged on torsemide 100 mg, Toprol 100 mg, Entresto 49-51 mg, Aldactone 25 mg, Verquvo 2.5 and Bidil. Today we presents with shortness of breath, orthopnea, and cough. With the shortness of breath, he feels like he is about to pass out. Reports not missing any doses since discharge. Reports dry weight of 340. Self reported weight today as 367-370 lbs. New ECHO with EF 30% with moderate MR and TR. Does not wear CPAP or check blood pressure at home because he does not have a cuff machine or CPAP machine. Reports falling yesterday and hitting the back of his head. Reports another fall today but was able to catch himself. On apixaban but he could not explain why.  Denies any headaches, blurry/double vision, and abdominal pain.       Review of patient's allergies indicates:  No Known Allergies  Past Medical History:   Diagnosis Date    Acute non-ST segment elevation myocardial infarction 05/13/2025    CHF (congestive heart failure)     Hypertension     Hypertensive emergency 05/13/2025     Past Surgical History:   Procedure Laterality Date    CARDIAC CATHETERIZATION       No family history on file.  Social History[1]  Review of Systems    Physical Exam     Initial Vitals [05/13/25 1424]   BP Pulse Resp Temp SpO2   (!) 206/109 (!) 122 20 97.4 °F (36.3 °C) 95 %      MAP       --         Physical Exam    Nursing note and vitals reviewed.  Constitutional: He appears well-developed and well-nourished.   HENT:   Head: Normocephalic and  atraumatic.   Eyes: EOM are normal.   Cardiovascular:  Normal rate and regular rhythm.           Murmur heard.  Abdominal: Abdomen is soft. There is no abdominal tenderness. There is no rebound and no guarding.   Musculoskeletal:         General: Edema present.     Neurological: He is oriented to person, place, and time.   Psychiatric: He has a normal mood and affect.         ED Course   Procedures  Labs Reviewed   COMPREHENSIVE METABOLIC PANEL - Abnormal       Result Value    Sodium 137      Potassium 3.8      Chloride 106      CO2 21 (*)     Glucose 114 (*)     BUN 28 (*)     Creatinine 1.7 (*)     Calcium 9.1      Protein Total 6.5      Albumin 3.2 (*)     Bilirubin Total 0.9      ALP 98      AST 27      ALT 21      Anion Gap 10      eGFR 57 (*)    TROPONIN I HIGH SENSITIVITY - Abnormal    Troponin High Sensitive 1,759 (*)    B-TYPE NATRIURETIC PEPTIDE - Abnormal    BNP >4,900 (*)    CBC WITH DIFFERENTIAL - Abnormal    WBC 7.92      RBC 4.75      HGB 11.7 (*)     HCT 36.4 (*)     MCV 77 (*)     MCH 24.6 (*)     MCHC 32.1      RDW 15.9 (*)     Platelet Count 266      MPV 9.4      Nucleated RBC 0      Neut % 70.4      Lymph % 20.1      Mono % 8.1      Eos % 0.3      Basophil % 0.5      Imm Grans % 0.6 (*)     Neut # 5.58      Lymph # 1.59      Mono # 0.64      Eos # 0.02      Baso # 0.04      Imm Grans # 0.05 (*)    TROPONIN I HIGH SENSITIVITY - Abnormal    Troponin High Sensitive 1,790 (*)    MAGNESIUM - Normal    Magnesium  2.0     LACTIC ACID, PLASMA - Normal    Lactic Acid Level 1.0      Narrative:     Falsely low lactic acid results can be found in samples containing >=13.0 mg/dL total bilirubin and/or >=3.5 mg/dL direct bilirubin.    CBC W/ AUTO DIFFERENTIAL    Narrative:     The following orders were created for panel order CBC auto differential.  Procedure                               Abnormality         Status                     ---------                               -----------         ------                      CBC with Differential[[id number]]       Abnormal            Final result                 Please view results for these tests on the individual orders.   LIGHT BLUE TOP HOLD    Extra Tube Hold for add-ons.     EXTRA TUBES    Narrative:     The following orders were created for panel order EXTRA TUBES.  Procedure                               Abnormality         Status                     ---------                               -----------         ------                     Light Blue Top Hold[[id number]]                             Final result               Light Green Top Hold[[id number]]                                                       Gold Top Hold[[id number]]                                                                Please view results for these tests on the individual orders.   LIGHT GREEN TOP HOLD   GOLD TOP HOLD        ECG Results              EKG 12-lead (Final result)        Collection Time Result Time QRS Duration [facility] QTC Calculation    05/13/25 14:28:39 05/13/25 14:58:52 90 458                     Final result by Interface, Lab In [facility] (05/13/25 14:58:56)                   Narrative:    Test Reason : R07.9,    Vent. Rate : 116 BPM     Atrial Rate : 116 BPM     P-R Int : 172 ms          QRS Dur :  90 ms      QT Int : 330 ms       P-R-T Axes :  43 -19  81 degrees    QTcB Int : 458 ms    Sinus tachycardia  Possible Left atrial enlargement  Possible Anterior infarct (cited on or before 19-Nov-2024)  Abnormal ECG  When compared with ECG of 07-May-2025 20:20,  Questionable change in The axis  Nonspecific T wave abnormality no longer evident in Inferior leads  Confirmed by Pollo Simon ([id number]) on 5/13/2025 2:58:46 PM    Referred By:            Confirmed By: Pollo Simon                                  Imaging Results              CT Head Without Contrast (In process)                      CT Cervical Spine Without Contrast (In process)                      X-Ray Chest AP Portable (Final  result)  Result time 05/13/25 18:24:46      Final result by Marco A Benson MD (05/13/25 18:24:46)                   Impression:      Cardiomegaly without additional evidence of acute cardiopulmonary process.      Electronically signed by: Marco A Benson  Date:    05/13/2025  Time:    18:24               Narrative:    EXAMINATION:  XR CHEST AP PORTABLE    CLINICAL HISTORY:  Chest Pain;    TECHNIQUE:  Single frontal view of the chest was performed.    COMPARISON:  Radiographs of the chest from 05/07/2025    FINDINGS:  Heart size is enlarged.  Pulmonary vasculature within normal limits.  A left subclavian single lead cardiac conduction device is present with lead terminations overlying the right ventricle.  No evidence of focal consolidation, pneumothorax, or pleural effusion.  No evidence of acute osseous process.                                       Medications   furosemide injection 80 mg (80 mg Intravenous Given 5/13/25 1747)     Medical Decision Making  23 y/o M presenting with heart failure exacerbation. Patient recently discharged from hospital for similar presentation. BNP > 4900, CXR with cardiomegaly. Troponin elevation likely in the setting of demand ischemia from heart failure. Will begin diuresis with IV Lasix 80 mg. Discussed with cardiology to begin diuresis and monitor urine output. Lactic acid obtained which was normal. No concerns for cardiogenic shock on examination. Discussed with hospital medicine, who will admit patient and advised them that there is low threshold to consult cardiology for further assistance in diurese. CT Cervical Spine and Head ordered given patients recent falls and being on apixaban. Images are pending read.     Amount and/or Complexity of Data Reviewed  Radiology: ordered.    Risk  Prescription drug management.  Decision regarding hospitalization.               ED Course as of 05/13/25 2029   Tue May 13, 2025   2028 EKG independently interpreted by me done at 2:28  p.m. sinus tachycardia rate of 116 left axis deviation no ST-elevation MI normal intervals [GK]      ED Course User Index  [GK] Alize Orta MD                           Clinical Impression:  Final diagnoses:  [R07.9] Chest pain  [R06.02] SOB (shortness of breath) (Primary)  [I50.43] Acute on chronic combined systolic and diastolic congestive heart failure  [R29.6] Falls          ED Disposition Condition    Admit                   Samad, Mawadah, MD  Resident  05/13/25 2028         [1]   Social History  Tobacco Use    Smoking status: Never     Passive exposure: Never    Smokeless tobacco: Never   Substance Use Topics    Alcohol use: Yes    Drug use: Never        Samad, Mawadah, MD  Resident  05/13/25 2029

## 2025-05-14 ENCOUNTER — DOCUMENTATION ONLY (OUTPATIENT)
Dept: CARDIOLOGY | Facility: CLINIC | Age: 25
End: 2025-05-14
Payer: MEDICAID
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
## 2025-05-14 PROBLEM — D50.9 MICROCYTIC ANEMIA: Status: ACTIVE | Noted: 2025-05-14

## 2025-05-14 LAB
ABSOLUTE EOSINOPHIL (OHS): 0.02 K/UL
ABSOLUTE MONOCYTE (OHS): 0.77 K/UL (ref 0.3–1)
ABSOLUTE NEUTROPHIL COUNT (OHS): 5.29 K/UL (ref 1.8–7.7)
ALBUMIN SERPL BCP-MCNC: 3.3 G/DL (ref 3.5–5.2)
ANION GAP (OHS): 11 MMOL/L (ref 8–16)
AORTIC SIZE INDEX (SOV): 1.2 CM/M2
AORTIC SIZE INDEX: 0.9 CM/M2
APTT PPP: 25.1 SECONDS (ref 21–32)
ASCENDING AORTA: 2.5 CM
AV AREA BY CONTINUOUS VTI: 4.3 CM2
AV INDEX (PROSTH): 0.84
AV LVOT MEAN GRADIENT: 0 MMHG
AV LVOT PEAK GRADIENT: 1 MMHG
AV MEAN GRADIENT: 1 MMHG
AV PEAK GRADIENT: 1 MMHG
AV VALVE AREA BY VELOCITY RATIO: 4.1 CM²
AV VALVE AREA: 4.1 CM2
AV VELOCITY RATIO: 0.83
BASOPHILS # BLD AUTO: 0.05 K/UL
BASOPHILS NFR BLD AUTO: 0.6 %
BSA FOR ECHO PROCEDURE: 2.87 M2
BUN SERPL-MCNC: 28 MG/DL (ref 6–20)
CALCIUM SERPL-MCNC: 9.1 MG/DL (ref 8.7–10.5)
CHLORIDE SERPL-SCNC: 103 MMOL/L (ref 95–110)
CHOLEST SERPL-MCNC: 109 MG/DL (ref 120–199)
CHOLEST/HDLC SERPL: 6.4 {RATIO} (ref 2–5)
CO2 SERPL-SCNC: 22 MMOL/L (ref 23–29)
CREAT SERPL-MCNC: 1.7 MG/DL (ref 0.5–1.4)
CV ECHO LV RWT: 0.31 CM
DOP CALC AO PEAK VEL: 0.6 M/S
DOP CALC AO VTI: 7.7 CM
DOP CALC LVOT AREA: 4.9 CM2
DOP CALC LVOT DIAMETER: 2.5 CM
DOP CALC LVOT PEAK VEL: 0.5 M/S
DOP CALC LVOT STROKE VOLUME: 31.9 CM3
DOP CALCLVOT PEAK VEL VTI: 6.5 CM
E WAVE DECELERATION TIME: 191 MS
E/A RATIO: 1.49
E/E' RATIO: 13 M/S
ECHO EF ESTIMATED: 11 %
ECHO LV POSTERIOR WALL: 1.2 CM (ref 0.6–1.1)
EJECTION FRACTION: 10 %
ERYTHROCYTE [DISTWIDTH] IN BLOOD BY AUTOMATED COUNT: 16 % (ref 11.5–14.5)
FERRITIN SERPL-MCNC: 59 NG/ML (ref 20–300)
FOLATE SERPL-MCNC: 7.8 NG/ML (ref 4–24)
FRACTIONAL SHORTENING: 5.2 % (ref 28–44)
GFR SERPLBLD CREATININE-BSD FMLA CKD-EPI: 57 ML/MIN/1.73/M2
GLUCOSE SERPL-MCNC: 88 MG/DL (ref 70–110)
HCT VFR BLD AUTO: 38.5 % (ref 40–54)
HDLC SERPL-MCNC: 17 MG/DL (ref 40–75)
HDLC SERPL: 15.6 % (ref 20–50)
HGB BLD-MCNC: 11.7 GM/DL (ref 14–18)
IMM GRANULOCYTES # BLD AUTO: 0.04 K/UL (ref 0–0.04)
IMM GRANULOCYTES NFR BLD AUTO: 0.5 % (ref 0–0.5)
INR PPP: 1.4 (ref 0.8–1.2)
INTERVENTRICULAR SEPTUM: 0.9 CM (ref 0.6–1.1)
IRON SATN MFR SERPL: 9 % (ref 20–50)
IRON SERPL-MCNC: 38 UG/DL (ref 45–160)
IVC DIAMETER: 2.39 CM
LA MAJOR: 5.6 CM
LA MINOR: 7.9 CM
LA WIDTH: 3.7 CM
LDLC SERPL CALC-MCNC: 79.2 MG/DL (ref 63–159)
LEFT ATRIUM SIZE: 4 CM
LEFT ATRIUM VOLUME INDEX MOD: 69 ML/M2
LEFT ATRIUM VOLUME INDEX: 31 ML/M2
LEFT ATRIUM VOLUME MOD: 185 ML
LEFT ATRIUM VOLUME: 82 CM3
LEFT INTERNAL DIMENSION IN SYSTOLE: 7.3 CM (ref 2.1–4)
LEFT VENTRICLE DIASTOLIC VOLUME INDEX: 117.41 ML/M2
LEFT VENTRICLE DIASTOLIC VOLUME: 317 ML
LEFT VENTRICLE MASS INDEX: 149.6 G/M2
LEFT VENTRICLE SYSTOLIC VOLUME INDEX: 104.4 ML/M2
LEFT VENTRICLE SYSTOLIC VOLUME: 282 ML
LEFT VENTRICULAR INTERNAL DIMENSION IN DIASTOLE: 7.7 CM (ref 3.5–6)
LEFT VENTRICULAR MASS: 403.8 G
LV LATERAL E/E' RATIO: 13.4
LV SEPTAL E/E' RATIO: 13.4
LYMPHOCYTES # BLD AUTO: 2.1 K/UL (ref 1–4.8)
MAGNESIUM SERPL-MCNC: 2 MG/DL (ref 1.6–2.6)
MCH RBC QN AUTO: 23.9 PG (ref 27–31)
MCHC RBC AUTO-ENTMCNC: 30.4 G/DL (ref 32–36)
MCV RBC AUTO: 79 FL (ref 82–98)
MV PEAK A VEL: 0.45 M/S
MV PEAK E VEL: 0.67 M/S
NONHDLC SERPL-MCNC: 92 MG/DL
NUCLEATED RBC (/100WBC) (OHS): 0 /100 WBC
OHS CV RV/LV RATIO: 0.78 CM
OHS QRS DURATION: 90 MS
OHS QTC CALCULATION: 490 MS
PHOSPHATE SERPL-MCNC: 4.6 MG/DL (ref 2.7–4.5)
PISA TR MAX VEL: 2.7 M/S
PLATELET # BLD AUTO: 270 K/UL (ref 150–450)
PMV BLD AUTO: 9 FL (ref 9.2–12.9)
POTASSIUM SERPL-SCNC: 4.6 MMOL/L (ref 3.5–5.1)
PROTHROMBIN TIME: 15.3 SECONDS (ref 9–12.5)
RA MAJOR: 5.44 CM
RA PRESSURE ESTIMATED: 15 MMHG
RA WIDTH: 5.29 CM
RBC # BLD AUTO: 4.89 M/UL (ref 4.6–6.2)
RELATIVE EOSINOPHIL (OHS): 0.2 %
RELATIVE LYMPHOCYTE (OHS): 25.4 % (ref 18–48)
RELATIVE MONOCYTE (OHS): 9.3 % (ref 4–15)
RELATIVE NEUTROPHIL (OHS): 64 % (ref 38–73)
RETICS/RBC NFR AUTO: 1.8 % (ref 0.4–2)
RIGHT ATRIAL AREA: 19.9 CM2
RIGHT VENTRICLE DIASTOLIC BASEL DIMENSION: 6 CM
RV TB RVSP: 18 MMHG
RV TISSUE DOPPLER FREE WALL SYSTOLIC VELOCITY 1 (APICAL 4 CHAMBER VIEW): 10.42 CM/S
SINUS: 3.24 CM
SODIUM SERPL-SCNC: 136 MMOL/L (ref 136–145)
STJ: 2.2 CM
TDI LATERAL: 0.05 M/S
TDI SEPTAL: 0.05 M/S
TDI: 0.05 M/S
TIBC SERPL-MCNC: 437 UG/DL (ref 250–450)
TRANSFERRIN SERPL-MCNC: 295 MG/DL (ref 200–375)
TRICUSPID ANNULAR PLANE SYSTOLIC EXCURSION: 1.4 CM
TRIGL SERPL-MCNC: 64 MG/DL (ref 30–150)
TV PEAK GRADIENT: 28 MMHG
TV REST PULMONARY ARTERY PRESSURE: 44 MMHG
VIT B12 SERPL-MCNC: 522 PG/ML (ref 210–950)
WBC # BLD AUTO: 8.27 K/UL (ref 3.9–12.7)
Z-SCORE OF LEFT VENTRICULAR DIMENSION IN END DIASTOLE: -12.01
Z-SCORE OF LEFT VENTRICULAR DIMENSION IN END SYSTOLE: -5.61

## 2025-05-14 PROCEDURE — 93005 ELECTROCARDIOGRAM TRACING: CPT

## 2025-05-14 PROCEDURE — 63600175 PHARM REV CODE 636 W HCPCS: Performed by: INTERNAL MEDICINE

## 2025-05-14 PROCEDURE — 84466 ASSAY OF TRANSFERRIN: CPT | Performed by: HOSPITALIST

## 2025-05-14 PROCEDURE — 97161 PT EVAL LOW COMPLEX 20 MIN: CPT

## 2025-05-14 PROCEDURE — 82607 VITAMIN B-12: CPT | Performed by: HOSPITALIST

## 2025-05-14 PROCEDURE — 80061 LIPID PANEL: CPT | Performed by: HOSPITALIST

## 2025-05-14 PROCEDURE — 20600001 HC STEP DOWN PRIVATE ROOM

## 2025-05-14 PROCEDURE — 36415 COLL VENOUS BLD VENIPUNCTURE: CPT | Performed by: HOSPITALIST

## 2025-05-14 PROCEDURE — 94761 N-INVAS EAR/PLS OXIMETRY MLT: CPT

## 2025-05-14 PROCEDURE — 97165 OT EVAL LOW COMPLEX 30 MIN: CPT

## 2025-05-14 PROCEDURE — 25000003 PHARM REV CODE 250: Performed by: HOSPITALIST

## 2025-05-14 PROCEDURE — 85730 THROMBOPLASTIN TIME PARTIAL: CPT | Performed by: HOSPITALIST

## 2025-05-14 PROCEDURE — 85610 PROTHROMBIN TIME: CPT | Performed by: HOSPITALIST

## 2025-05-14 PROCEDURE — 85025 COMPLETE CBC W/AUTO DIFF WBC: CPT | Performed by: HOSPITALIST

## 2025-05-14 PROCEDURE — 97116 GAIT TRAINING THERAPY: CPT

## 2025-05-14 PROCEDURE — 82728 ASSAY OF FERRITIN: CPT | Performed by: HOSPITALIST

## 2025-05-14 PROCEDURE — 93010 ELECTROCARDIOGRAM REPORT: CPT | Mod: ,,, | Performed by: INTERNAL MEDICINE

## 2025-05-14 PROCEDURE — 85045 AUTOMATED RETICULOCYTE COUNT: CPT | Performed by: HOSPITALIST

## 2025-05-14 PROCEDURE — 82947 ASSAY GLUCOSE BLOOD QUANT: CPT | Performed by: HOSPITALIST

## 2025-05-14 PROCEDURE — 83735 ASSAY OF MAGNESIUM: CPT | Performed by: HOSPITALIST

## 2025-05-14 PROCEDURE — 63600175 PHARM REV CODE 636 W HCPCS: Performed by: HOSPITALIST

## 2025-05-14 PROCEDURE — 82746 ASSAY OF FOLIC ACID SERUM: CPT | Performed by: HOSPITALIST

## 2025-05-14 PROCEDURE — 25000242 PHARM REV CODE 250 ALT 637 W/ HCPCS: Performed by: HOSPITALIST

## 2025-05-14 PROCEDURE — 25000003 PHARM REV CODE 250: Performed by: INTERNAL MEDICINE

## 2025-05-14 RX ORDER — POTASSIUM CHLORIDE 20 MEQ/1
40 TABLET, EXTENDED RELEASE ORAL ONCE
Status: COMPLETED | OUTPATIENT
Start: 2025-05-14 | End: 2025-05-14

## 2025-05-14 RX ORDER — METOCLOPRAMIDE HYDROCHLORIDE 5 MG/ML
10 INJECTION INTRAMUSCULAR; INTRAVENOUS EVERY 6 HOURS PRN
Status: DISCONTINUED | OUTPATIENT
Start: 2025-05-14 | End: 2025-05-18 | Stop reason: HOSPADM

## 2025-05-14 RX ORDER — FUROSEMIDE 10 MG/ML
120 INJECTION INTRAMUSCULAR; INTRAVENOUS ONCE
Status: COMPLETED | OUTPATIENT
Start: 2025-05-14 | End: 2025-05-14

## 2025-05-14 RX ORDER — FUROSEMIDE 10 MG/ML
80 INJECTION INTRAMUSCULAR; INTRAVENOUS ONCE
Status: COMPLETED | OUTPATIENT
Start: 2025-05-14 | End: 2025-05-14

## 2025-05-14 RX ORDER — HYDROXYZINE PAMOATE 25 MG/1
25 CAPSULE ORAL EVERY 6 HOURS PRN
Status: DISCONTINUED | OUTPATIENT
Start: 2025-05-14 | End: 2025-05-18 | Stop reason: HOSPADM

## 2025-05-14 RX ORDER — METOLAZONE 5 MG/1
5 TABLET ORAL DAILY
Status: DISCONTINUED | OUTPATIENT
Start: 2025-05-14 | End: 2025-05-15

## 2025-05-14 RX ORDER — MAGNESIUM GLUCONATE 27 MG(500)
54 TABLET ORAL ONCE
Status: COMPLETED | OUTPATIENT
Start: 2025-05-14 | End: 2025-05-14

## 2025-05-14 RX ADMIN — METOLAZONE 5 MG: 5 TABLET ORAL at 06:05

## 2025-05-14 RX ADMIN — ONDANSETRON 4 MG: 2 INJECTION INTRAMUSCULAR; INTRAVENOUS at 10:05

## 2025-05-14 RX ADMIN — Medication 54 MG: at 02:05

## 2025-05-14 RX ADMIN — POTASSIUM CHLORIDE 40 MEQ: 1500 TABLET, EXTENDED RELEASE ORAL at 01:05

## 2025-05-14 RX ADMIN — GUAIFENESIN AND DEXTROMETHORPHAN 10 ML: 100; 10 SYRUP ORAL at 09:05

## 2025-05-14 RX ADMIN — FUROSEMIDE 30 MG/HR: 10 INJECTION, SOLUTION INTRAMUSCULAR; INTRAVENOUS at 11:05

## 2025-05-14 RX ADMIN — FUROSEMIDE 80 MG: 10 INJECTION, SOLUTION INTRAVENOUS at 01:05

## 2025-05-14 RX ADMIN — ENOXAPARIN SODIUM 60 MG: 60 INJECTION SUBCUTANEOUS at 08:05

## 2025-05-14 RX ADMIN — METOPROLOL SUCCINATE 100 MG: 100 TABLET, EXTENDED RELEASE ORAL at 08:05

## 2025-05-14 RX ADMIN — HYDROXYZINE PAMOATE 25 MG: 25 CAPSULE ORAL at 01:05

## 2025-05-14 RX ADMIN — NITROGLYCERIN 0.4 MG: 0.4 TABLET, ORALLY DISINTEGRATING SUBLINGUAL at 01:05

## 2025-05-14 RX ADMIN — FUROSEMIDE 10 MG/HR: 10 INJECTION, SOLUTION INTRAMUSCULAR; INTRAVENOUS at 01:05

## 2025-05-14 RX ADMIN — ACETAMINOPHEN 650 MG: 325 TABLET ORAL at 08:05

## 2025-05-14 RX ADMIN — METOPROLOL SUCCINATE 100 MG: 100 TABLET, EXTENDED RELEASE ORAL at 09:05

## 2025-05-14 RX ADMIN — ENOXAPARIN SODIUM 60 MG: 60 INJECTION SUBCUTANEOUS at 09:05

## 2025-05-14 RX ADMIN — PROCHLORPERAZINE EDISYLATE 5 MG: 5 INJECTION INTRAMUSCULAR; INTRAVENOUS at 11:05

## 2025-05-14 RX ADMIN — FUROSEMIDE 120 MG: 10 INJECTION, SOLUTION INTRAVENOUS at 06:05

## 2025-05-14 NOTE — SUBJECTIVE & OBJECTIVE
Past Medical History:   Diagnosis Date    Acute non-ST segment elevation myocardial infarction 05/13/2025    CHF (congestive heart failure)     Hypertension     Hypertensive emergency 05/13/2025       Past Surgical History:   Procedure Laterality Date    CARDIAC CATHETERIZATION         Review of patient's allergies indicates:  No Known Allergies    No current facility-administered medications on file prior to encounter.     Current Outpatient Medications on File Prior to Encounter   Medication Sig    FARXIGA 10 mg tablet Take 1 tablet (10 mg total) by mouth once daily.    isosorbide-hydrALAZINE 20-37.5 mg (BIDIL) 20-37.5 mg Tab Take 1 tablet by mouth 3 (three) times daily.    metoprolol succinate (TOPROL-XL) 100 MG 24 hr tablet Take 1 tablet (100 mg total) by mouth 2 (two) times daily.    nitroGLYCERIN (NITROSTAT) 0.4 MG SL tablet Place 0.4 mg under the tongue every 5 (five) minutes as needed.    spironolactone (ALDACTONE) 25 MG tablet Take 1 tablet (25 mg total) by mouth once daily.    torsemide (DEMADEX) 100 MG Tab Take 100 mg by mouth once daily.    traZODone (DESYREL) 50 MG tablet Take 50 mg by mouth nightly as needed.    vericiguat (VERQUVO) 2.5 mg Tab Take 1 tablet (2.5 mg total) by mouth once daily.    sacubitriL-valsartan (ENTRESTO) 49-51 mg per tablet Take 1 tablet by mouth 2 (two) times daily.     Family History    None       Tobacco Use    Smoking status: Never     Passive exposure: Never    Smokeless tobacco: Never   Substance and Sexual Activity    Alcohol use: Yes    Drug use: Never    Sexual activity: Not on file     Review of Systems   Constitutional:  Positive for activity change and fatigue. Negative for appetite change.   HENT: Negative.     Respiratory:  Positive for shortness of breath.    Cardiovascular:  Positive for leg swelling.   Gastrointestinal: Negative.  Negative for nausea and vomiting.   Genitourinary: Negative.    Musculoskeletal: Negative.    Skin:  Negative for wound.  "  Neurological:  Positive for weakness.     Objective:     Vital Signs (Most Recent):  Temp: 98.3 °F (36.8 °C) (25 0003)  Pulse: (!) 113 (25 0027)  Resp: (!) 22 (25 0003)  BP: (!) 140/89 (25 0130)  SpO2: 97 % (25 0003) Vital Signs (24h Range):  Temp:  [97.4 °F (36.3 °C)-98.3 °F (36.8 °C)] 98.3 °F (36.8 °C)  Pulse:  [107-126] 113  Resp:  [14-22] 22  SpO2:  [95 %-99 %] 97 %  BP: (133-206)/() 140/89     Weight: (!) 168.9 kg (372 lb 5.7 oz)  Body mass index is 53.43 kg/m².     Physical Exam  Vitals and nursing note reviewed.   Constitutional:       Appearance: He is obese. He is ill-appearing.   HENT:      Head: Normocephalic and atraumatic.   Eyes:      General: No scleral icterus.  Neck:      Comments: Unable to note JVP  Cardiovascular:      Rate and Rhythm: Regular rhythm. Tachycardia present.      Heart sounds: No murmur heard.     Comments: Anasarca present  Pulmonary:      Effort: Pulmonary effort is normal. No respiratory distress.      Breath sounds: Examination of the right-lower field reveals decreased breath sounds. Examination of the left-lower field reveals decreased breath sounds. Decreased breath sounds present. No wheezing.   Musculoskeletal:      Right lower le+ Pitting Edema present.      Left lower le+ Pitting Edema present.   Skin:     General: Skin is warm and dry.   Neurological:      General: No focal deficit present.      Mental Status: He is alert.                Significant Labs: All pertinent labs within the past 24 hours have been reviewed.  ABGs: No results for input(s): "PH", "PCO2", "HCO3", "POCSATURATED", "BE", "TOTALHB", "COHB", "METHB", "O2HB", "POCFIO2", "PO2" in the last 48 hours.  CBC:   Recent Labs   Lab 25  1529   WBC 7.92   HGB 11.7*   HCT 36.4*        CMP:   Recent Labs   Lab 25  1529      K 3.8      CO2 21*   *   BUN 28*   CREATININE 1.7*   CALCIUM 9.1   PROT 6.5   ALBUMIN 3.2*   BILITOT 0.9 " "  ALKPHOS 98   AST 27   ALT 21   ANIONGAP 10     Cardiac Markers:   Recent Labs   Lab 05/13/25  1529   BNP >4,900*     Coagulation: No results for input(s): "PT", "INR", "APTT" in the last 48 hours.  Lactic Acid:   Recent Labs   Lab 05/13/25  1906   LACTATE 1.0     Magnesium:   Recent Labs   Lab 05/13/25  1529   MG 2.0     Troponin:   Recent Labs   Lab 05/13/25  1529 05/13/25  1757   TROPONINIHS 1,759* 1,790*     Urine Studies:   Recent Labs   Lab 05/13/25  2227   COLORU Yellow   APPEARANCEUA Clear   PHUR 7.0   SPECGRAV 1.005   PROTEINUA 1+*   GLUCUA Negative   BILIRUBINUA Negative   OCCULTUA Negative   NITRITE Negative   UROBILINOGEN Negative   LEUKOCYTESUR Negative   RBCUA 1   WBCUA <1   BACTERIA Rare   HYALINECASTS 0       Significant Imaging:     CT HEAD WITHOUT CONTRAST; CT CERVICAL SPINE WITHOUT CONTRAST     CLINICAL HISTORY:  Fall and on Eliquis;; Fall with head trauma;     TECHNIQUE:  Low dose axial CT images obtained throughout the head and cervical spine without intravenous contrast. Sagittal and coronal reconstructions were performed.     COMPARISON:  Chest radiograph 05/07/2025     FINDINGS:  Head CT:     Intracranial compartment: Brain appears normally formed. Ventricles and sulci are normal in size for age without evidence of hydrocephalus. No extra-axial blood or fluid collections.     The brain parenchyma appears normal. No parenchymal mass, hemorrhage, edema or major vascular distribution infarct.     Skull/extracranial contents (limited evaluation): Small focus of subcutaneous fat stranding overlying the right parietal calvarium suggesting small scalp contusion.  No fracture. Mastoid air cells and paranasal sinuses are essentially clear.  Imaged portions of the orbits are within normal limits.     Cervical spine CT: Straightening of the cervical lordosis.  Well corticated suspected anatomic variant at the left transverse process of C1.  Vertebral body and intervertebral disc space heights appear " relatively maintained.  Bones are well mineralized.  Dens and lateral masses are well aligned and intact.  No displaced fracture, dislocation or significant listhesis.  No destructive osseous process.  No significant degenerative change, spinal canal stenosis or neural foraminal narrowing seen at any level.  No prevertebral soft tissue thickening.  No paraspinal mass or fluid collection.  No subcutaneous emphysema or radiopaque foreign body.     Partially imaged catheter seen within the left subclavian vein.  Left thyroid is asymmetrically larger and heterogeneous suggesting underlying nodules.  There is associated mass effect with mild rightward shift and narrowing of the trachea which remains patent.  Imaged lung apices are clear.     Impression:     1. Suspected right parietal scalp small soft tissue contusion without displaced skull fracture or acute intracranial abnormality identified.  2. No CT evidence of cervical spine acute osseous traumatic injury.  3. Enlarged heterogeneous thyroid suggesting underlying nodules.  Further evaluation with elective/nonemergent thyroid ultrasound can be obtained as warranted.  This report was flagged in Epic as containing an incidental finding.        Electronically signed by: Justin Lew MD  Date:                                            05/13/2025  Time:                                           20:59      XR CHEST AP PORTABLE     CLINICAL HISTORY:  Chest Pain;     TECHNIQUE:  Single frontal view of the chest was performed.     COMPARISON:  Radiographs of the chest from 05/07/2025     FINDINGS:  Heart size is enlarged.  Pulmonary vasculature within normal limits.  A left subclavian single lead cardiac conduction device is present with lead terminations overlying the right ventricle.  No evidence of focal consolidation, pneumothorax, or pleural effusion.  No evidence of acute osseous process.     Impression:     Cardiomegaly without additional evidence of acute  cardiopulmonary process.        Electronically signed by: Marco A Benson  Date:                                            05/13/2025  Time:                                           18:24

## 2025-05-14 NOTE — SUBJECTIVE & OBJECTIVE
Past Medical History:   Diagnosis Date    Acute non-ST segment elevation myocardial infarction 05/13/2025    CHF (congestive heart failure)     Hypertension     Hypertensive emergency 05/13/2025       Past Surgical History:   Procedure Laterality Date    CARDIAC CATHETERIZATION         Review of patient's allergies indicates:  No Known Allergies    No current facility-administered medications on file prior to encounter.     Current Outpatient Medications on File Prior to Encounter   Medication Sig    FARXIGA 10 mg tablet Take 1 tablet (10 mg total) by mouth once daily.    isosorbide-hydrALAZINE 20-37.5 mg (BIDIL) 20-37.5 mg Tab Take 1 tablet by mouth 3 (three) times daily.    metoprolol succinate (TOPROL-XL) 100 MG 24 hr tablet Take 1 tablet (100 mg total) by mouth 2 (two) times daily.    nitroGLYCERIN (NITROSTAT) 0.4 MG SL tablet Place 0.4 mg under the tongue every 5 (five) minutes as needed.    spironolactone (ALDACTONE) 25 MG tablet Take 1 tablet (25 mg total) by mouth once daily.    torsemide (DEMADEX) 100 MG Tab Take 100 mg by mouth once daily.    traZODone (DESYREL) 50 MG tablet Take 50 mg by mouth nightly as needed.    vericiguat (VERQUVO) 2.5 mg Tab Take 1 tablet (2.5 mg total) by mouth once daily.    sacubitriL-valsartan (ENTRESTO) 49-51 mg per tablet Take 1 tablet by mouth 2 (two) times daily.     Family History    None       Tobacco Use    Smoking status: Never     Passive exposure: Never    Smokeless tobacco: Never   Substance and Sexual Activity    Alcohol use: Yes    Drug use: Never    Sexual activity: Not on file     ROS  Objective:     Vital Signs (Most Recent):  Temp: 98.2 °F (36.8 °C) (05/14/25 0753)  Pulse: 109 (05/14/25 0753)  Resp: 19 (05/14/25 0753)  BP: (!) 176/93 (05/14/25 0753)  SpO2: (!) 93 % (05/14/25 0753) Vital Signs (24h Range):  Temp:  [97.4 °F (36.3 °C)-98.9 °F (37.2 °C)] 98.2 °F (36.8 °C)  Pulse:  [107-126] 109  Resp:  [14-22] 19  SpO2:  [92 %-99 %] 93 %  BP: (133-206)/()  176/93     Weight: (!) 168.9 kg (372 lb 5.7 oz)  Body mass index is 53.43 kg/m².    SpO2: (!) 93 %         Intake/Output Summary (Last 24 hours) at 5/14/2025 0858  Last data filed at 5/14/2025 0603  Gross per 24 hour   Intake 480 ml   Output 3375 ml   Net -2895 ml       Lines/Drains/Airways       Peripheral Intravenous Line  Duration                  Peripheral IV - Single Lumen 05/13/25 1530 18 G Posterior;Right Forearm <1 day                     Physical Exam  Vitals reviewed.   Constitutional:       Appearance: He is obese. He is ill-appearing.   HENT:      Head: Normocephalic and atraumatic.      Mouth/Throat:      Mouth: Mucous membranes are moist.      Pharynx: Oropharynx is clear.   Eyes:      General: No scleral icterus.     Extraocular Movements: Extraocular movements intact.      Conjunctiva/sclera: Conjunctivae normal.   Cardiovascular:      Rate and Rhythm: Regular rhythm. Tachycardia present.      Heart sounds: Normal heart sounds.   Pulmonary:      Effort: Pulmonary effort is normal.      Breath sounds: Normal breath sounds. No wheezing or rhonchi.   Abdominal:      General: There is distension.      Tenderness: There is no abdominal tenderness.   Musculoskeletal:      Right lower leg: Edema (2+ to knee) present.      Left lower leg: Edema (2+ to knee) present.   Skin:     General: Skin is warm and dry.   Neurological:      Mental Status: He is alert and oriented to person, place, and time. Mental status is at baseline.   Psychiatric:         Mood and Affect: Mood normal.         Behavior: Behavior normal.         Thought Content: Thought content normal.          Significant Labs: BMP:   Recent Labs   Lab 05/13/25  1529 05/14/25  0539   * 88    136   K 3.8 4.6    103   CO2 21* 22*   BUN 28* 28*   CREATININE 1.7* 1.7*   CALCIUM 9.1 9.1   MG 2.0 2.0   , CMP   Recent Labs   Lab 05/13/25  1529 05/14/25  0539    136   K 3.8 4.6    103   CO2 21* 22*   * 88   BUN 28* 28*  "  CREATININE 1.7* 1.7*   CALCIUM 9.1 9.1   PROT 6.5  --    ALBUMIN 3.2* 3.3*   BILITOT 0.9  --    ALKPHOS 98  --    AST 27  --    ALT 21  --    ANIONGAP 10 11   , and Troponin   Recent Labs   Lab 05/13/25  1529 05/13/25  1757   TROPONINIHS 1,759* 1,790*       Significant Imaging: Echocardiogram: Transthoracic echo (TTE) complete ([facility]upid Only):   Results for orders placed or performed during the hospital encounter of 11/16/24   Echo   Result Value Ref Range    LV Diastolic Volume 281.85 mL    Echo EF Estimated 15 %    LV Systolic Volume 238.49 mL    IVS 0.8 0.6 - 1.1 cm    LVIDd 7.3 (A) 3.5 - 6.0 cm    LVIDs 6.8 (A) 2.1 - 4.0 cm    LVOT diameter 2.5 cm    PW 1.6 (A) 0.6 - 1.1 cm    IVRT 74.22 ms    AV LVOT peak gradient 1 mmHg    LVOT mn grad 1 mmHg    LVOT peak arturo 0.6 m/s    LVOT peak VTI 9.1 cm    RV- linares basal diam 4.7 cm    RV S' 9.54 cm/s    LA size 5.0 cm    Left Atrium Major Axis 6.29 cm    Left Atrium Minor Axis 6.11 cm    LA Vol (MOD) 151.41 mL    RA Major Axis 5.59 cm    RA Area 24.7 cm2    AV valve area 3.7 cm2    AV area by cont VTI 3.6 cm2    AV peak gradient 2.6 mmHg    AV mean gradient 1.5 mmHg    Ao peak arturo 0.8 m/s    Ao VTI 12.1 cm    MV Peak A Arturo 0.32 m/s    E wave deceleration time 75.83 ms    MV Peak E Arturo 0.68 m/s    E/A ratio 2.13     LV LATERAL E/E' RATIO 9.71     LV SEPTAL E/E' RATIO 17.00     TDI LATERAL 0.07 m/s    TDI SEPTAL 0.04 m/s    TV peak gradient 36 mmHg    TR Max Arturo 3.02 m/s    Ascending aorta 3.32 cm    STJ 3.08 cm    P vein A 0.1 m/s    MV "A" wave duration 142.72 ms    Pulm vein "A" wave 142.72 ms    PV Peak D Arturo 0.23 m/s    PV Peak S Arturo 0.17 m/s    IVC diameter 2.74 cm    Sinus 3.68 cm    LA WIDTH 5.17 cm    RA Width 5.24 cm    TAPSE 1.30 cm    BSA 2.77 m2    LVOT stroke volume 44.6 cm3    LV Systolic Volume Index 90.7 mL/m2    LV Diastolic Volume Index 107.17 mL/m2    LVOT area 4.9 cm2    FS 6.8 (A) 28 - 44 %    Left Ventricle Relative Wall Thickness 0.44 cm    LV mass " 436.3 g    LV Mass Index 165.9 g/m2    E/E' ratio 12.36 m/s    [place] 51.8 mL/m2    LA Vol 136.20 cm3    Pulm vein S/D ratio 0.74     RV/LV Ratio 0.64 cm    [place] (MOD) 57.6 mL/m2    AV Velocity Ratio 0.75     AV index (prosthetic) 0.75     KATIE by Velocity Ratio 3.7 cm²    Triscuspid Valve Regurgitation Peak Gradient 36 mmHg    Mean e' 0.06 m/s    ZLVIDS -4.39     ZLVIDD -10.21     TV resting pulmonary artery pressure 51 mmHg    RV TB RVSP 18 mmHg    Est. RA pres 15 mmHg    TASV 9.0 cm/s    Narrative      Left Ventricle: The left ventricle is severely dilated. Normal wall   thickness. There is concentric hypertrophy. Severe global hypokinesis   present. There is severely reduced systolic function with a visually   estimated ejection fraction of 10 -15%. Grade II diastolic dysfunction.    Right Ventricle: Mild right ventricular enlargement. Systolic function   is moderately reduced.    Severe biatrial enlargement    Mitral Valve: There is mild regurgitation.    Pulmonary Artery: There is moderate pulmonary hypertension. The   estimated pulmonary artery systolic pressure is 51 mmHg.    IVC/SVC: Elevated venous pressure at 15 mmHg.       EKG: Sinus tachycardia     X-Ray: CXR: X-Ray Chest 1 View (CXR): No results found for this visit on 05/13/25.

## 2025-05-14 NOTE — PT/OT/SLP EVAL
"Physical Therapy Co-Evaluation and Co-Treatment and Discharge Note    Patient Name:  Michael Conti   MRN:  [id number]    Recommendations:     Discharge Recommendations: No Therapy Indicated  Discharge Equipment Recommendations: shower chair   Barriers to Discharge: None  Safest Mobility Level with Nursing: amb in room with Supervision    Assessment:     Michael Conti is a 24 y.o. male admitted with a medical diagnosis of Acute on chronic combined systolic and diastolic heart failure. Pt received UIC and agreeable to IE. Pt demonstration excellent functional strength, and safe mobility ambulating 60ftx2 with standing rest break. Pt reports to "not walk much" at home at baseline. Post ambulation pt having coughing fit, with occasional dry heaving. OT leaving room to inquire with RN about water restriction as none was available in the room. Pt continuing to cough and then opting to resume half kneeing at EOB with B UE on bed to assist with breathing. Pt remaining there for 1-2 minutes then transitioning back to chair with ease, no assist required. Pt reports coughing happened occasionally when he "walks too much" and tripod position helps him to recover.   Patient is able to complete all visualized functional mobility with independence. They are functioning at their baseline and no longer require acute care physical therapy.    Plan:     During this hospitalization, patient does not require further acute PT services. Please re-consult if functional status changes.      Subjective     Chief Complaint: "I want to go home"  Patient/Family Comments/goals: To go home  Pain/Comfort:  Pain Rating 1: 5/10  Location - Side 1: Bilateral  Location 1: ankle  Pain Addressed 1: Distraction, Reposition  Pain Rating Post-Intervention 1: other (see comments) (pt did not rate)    Patients cultural, spiritual, [employer] conflicts given the current situation: no    Living Environment:  Living Environment: Patient lives with their " grandmother in a single story house with number of outside stair(s): 0.  Prior Level of Function: Prior to admission, patient was independent.  Equipment Used at Home: grab bar.  DME owned (not currently used): none  Assistance Upon Discharge: family    Objective:     Communicated with RN prior to session.  Patient found up in chair with telemetry, peripheral IV upon PT entry to room.    General Precautions: Standard, fall   Orthopedic Precautions:N/A   Braces: N/A    Exams:  Cognitive Exam:  Patient is oriented to Person, Place, Time, Situation  RLE ROM: WFL  RLE Strength: WFL  LLE ROM: WFL  LLE Strength: WFL  Sensation: Intact light touch to BLEs  -       Intact  RLE ROM: WNL  RLE Strength: 5/5 in all major muscle groups    LLE ROM: WNL  LLE Strength: 5/5 in all major muscle groups     Functional Mobility:  Bed Mobility:     Seated in chair at start of session and returned to chair  Transfers:     Sit to Stand: independence with no AD x 2 from chair   Gait: Patient ambulated 60ft x2 with standing rest break with no AD and independence.   Patient demonstrates decreased weight shift and inconsistent bilateral foot placement.  Patient required cues for foot placement.  All lines remained intact throughout ambulation trial.  Therapeutic Activities and Exercises:  Patient educated on role of acute care PT and PT POC  Educated about gradual progression of activity once out of hospital  Educated about safety with functional mobility  Answered all questions within PT scope of practice to patient's satisfaction    AM-PAC 6 CLICK MOBILITY  Total Score:22     Patient left up in chair with all lines intact, call button in reach, and RN present .    GOALS:   Multidisciplinary Problems       Physical Therapy Goals       Not on file              Multidisciplinary Problems (Resolved)          Problem: Physical Therapy    Goal Priority Disciplines Outcome Interventions   Physical Therapy Goal   (Resolved)     PT, PT/OT Met     Description: Goals to be met by: d/c     Patient will increase functional independence with mobility by performin. Sit to stand transfer with [place]  2. Gait  x 50 feet with [place] using No Assistive Device.             Problem: Physical Therapy    Goal Priority Disciplines Outcome Interventions   Physical Therapy Goal   (Resolved)     PT, PT/OT Met    Description: Goals to be met by: d/c     Patient will increase functional independence with mobility by performin. Sit to stand transfer with [place]  2. Gait  x 50 feet with [place] using No Assistive Device.                          DME Justifications:  No DME recommended requiring DME justifications    History:     Past Medical History:   Diagnosis Date    Acute non-ST segment elevation myocardial infarction 2025    CHF (congestive heart failure)     Hypertension     Hypertensive emergency 2025       Past Surgical History:   Procedure Laterality Date    CARDIAC CATHETERIZATION         Time Tracking:     PT Received On: 25  PT Start Time: 1002     PT Stop Time: 1025  PT Total Time (min): 23 min     Billable Minutes: Evaluation 13 Gait Training 10      2025

## 2025-05-14 NOTE — CONSULTS
Food & Nutrition  Education     Diet Education: Fluid and Salt restriction   Time Spent: 10 minutes   Learners: Patient      Handouts provided: Low Sodium Nutrition Therapy, Fluid-Restricted Nutrition Therapy      Comments: Discussed importance of a low sodium diet. Reviewed high sodium foods that should be avoided. Food labels, salt free seasonings, and recommended sodium intake reviewed. Encouraged healthy, fresh foods that are low in sodium that are good for consumption. Fluid intake and conversions discussed. Foods considered fluids were reviewed and encouraged to monitor. General healthy diet encouraged. All questions/concerns were addressed.     Follow-Up: Yes     Please Re-consult as needed.   Thanks!

## 2025-05-14 NOTE — ED NOTES
Telemetry Verification   Patient placed on Telemetry Box  Verified with War Room  Box # [id number]   [facility]   Rate 119   Rhythm Sinus Tach

## 2025-05-14 NOTE — PLAN OF CARE
Plan of care reviewed with patient, understanding of care verbalized. Patient remained free of fall/injuries, fall precaution in place. Patient is resting in bed with no questions or concerns at this time. Bed is lowest locked position, call light within reach.   Problem: Bariatric Environmental Safety  Goal: Safety Maintained with Care  Outcome: Progressing     Problem: Infection  Goal: Absence of Infection Signs and Symptoms  Outcome: Progressing     Problem: Fall Injury Risk  Goal: Absence of Fall and Fall-Related Injury  Outcome: Progressing

## 2025-05-14 NOTE — PLAN OF CARE
Problem: Occupational Therapy  Goal: Occupational Therapy Goal  Description: Goals to be met by: 05-21-25     Patient will increase functional independence with ADLs by performing:    UE Dressing with [place].  Grooming while standing with [place].  Toileting from toilet with [place] for hygiene and clothing management.   Step transfer with [place]  Toilet transfer to toilet with [place].  Pt. To state 3 energy conservation techniques to implement in home environment with ADL/IADL task performance    Outcome: Progressing

## 2025-05-14 NOTE — CONSULTS
[facility] Step[facility]  Cardiology  Consult Note    Patient Name: Michael Conti  MRN: [id number]  Admission Date: 2025  Hospital Length of Stay: 1 days  Code Status: Full Code   Attending Provider: Miladis Cervantes MD   Consulting Provider: Justin Tao MD  Primary Care Physician: Lele Verde NP (Inactive)  Principal Problem:Acute on chronic combined systolic and diastolic heart failure    Patient information was obtained from patient, past medical records, ER records, and primary team.     Inpatient consult to Cardiology  Consult performed by: Justin Tao MD  Consult ordered by: Saeid Dunlap MD        Subjective:     Chief Complaint:  Shortness of breath     HPI:   Michael Conti is a 24 year old male with non-Ischemic cardiomyopathy (HFrEF EF 30%) s/p AICD, HTN, CKD, and severe obesity, presents to ED with shortness of breath, orthopnea.      He was admitted to [facility] from - for management of heart failure exacerbation. Primary Cardiologist is Dr. Frannie García ([facility]) during admission he was treated with IV lasix, IV milrinone, restarted on Toprol 100mg, Entresto & Aldactone 2 days into admission. At discharge started on Torsemide 100mg & Verquvo 2.5mg daily.  Farxiga & Bidil were held during inpatient admission. He reported that as an outpatient Bidil/Entresto/Spironolactone were on hold by his cardiologist.  He reports feeling well the day after discharge and has continued Toprol, Farxiga, Torsemide, Verquvo. He has had prior left heart cath with normal coronary vessels. Patient notes that his mother  in her 40's from heart failure, and notes that she had lupus.      He reported started feeling dyspnea, orthopnea, WILLIS w/ pre-syncope symptoms on oral regimen, reports his dry weight is 340. Patient presented to [facility] yesterday for evaluation due to prior admission here, felt he requires more diuresis.  He has no chest pain complaints, unclear if he has had increase dietary  indiscretion.  He is concerned about how long he will be admitted to ensure adequate diuresis. Labs significant for elevated troponin, which is not rising (1,790-->1,759), BNP >4,900, and CR 1.7 (1.38 on 5/11, baseline 1.4). Given IV lasix 80 mg and then started on lasix gtt at 10 mg/hr. Patient with approximately 3 L UOP thus far.     Patient states he is still feeling short of breath and feels like his legs are swollen. Continues to deny any chest pain. Patient states his cardiomyopathy was diagnosed at age 19, but significantly worsened over the past year. Patient denies any personal history of autoimmune disease. Denies any preceding illness prior to onset of cardiac issues, and states that it occurred after he stopped playing sports. Denies smoking or frequent alcohol use. Denies any illicit substance use.     Past Medical History:   Diagnosis Date    Acute non-ST segment elevation myocardial infarction 05/13/2025    CHF (congestive heart failure)     Hypertension     Hypertensive emergency 05/13/2025       Past Surgical History:   Procedure Laterality Date    CARDIAC CATHETERIZATION         Review of patient's allergies indicates:  No Known Allergies    No current facility-administered medications on file prior to encounter.     Current Outpatient Medications on File Prior to Encounter   Medication Sig    FARXIGA 10 mg tablet Take 1 tablet (10 mg total) by mouth once daily.    isosorbide-hydrALAZINE 20-37.5 mg (BIDIL) 20-37.5 mg Tab Take 1 tablet by mouth 3 (three) times daily.    metoprolol succinate (TOPROL-XL) 100 MG 24 hr tablet Take 1 tablet (100 mg total) by mouth 2 (two) times daily.    nitroGLYCERIN (NITROSTAT) 0.4 MG SL tablet Place 0.4 mg under the tongue every 5 (five) minutes as needed.    spironolactone (ALDACTONE) 25 MG tablet Take 1 tablet (25 mg total) by mouth once daily.    torsemide (DEMADEX) 100 MG Tab Take 100 mg by mouth once daily.    traZODone (DESYREL) 50 MG tablet Take 50 mg by mouth  nightly as needed.    vericiguat (VERQUVO) 2.5 mg Tab Take 1 tablet (2.5 mg total) by mouth once daily.    sacubitriL-valsartan (ENTRESTO) 49-51 mg per tablet Take 1 tablet by mouth 2 (two) times daily.     Family History    None       Tobacco Use    Smoking status: Never     Passive exposure: Never    Smokeless tobacco: Never   Substance and Sexual Activity    Alcohol use: Yes    Drug use: Never    Sexual activity: Not on file     ROS  Objective:     Vital Signs (Most Recent):  Temp: 98.2 °F (36.8 °C) (05/14/25 0753)  Pulse: 109 (05/14/25 0753)  Resp: 19 (05/14/25 0753)  BP: (!) 176/93 (05/14/25 0753)  SpO2: (!) 93 % (05/14/25 0753) Vital Signs (24h Range):  Temp:  [97.4 °F (36.3 °C)-98.9 °F (37.2 °C)] 98.2 °F (36.8 °C)  Pulse:  [107-126] 109  Resp:  [14-22] 19  SpO2:  [92 %-99 %] 93 %  BP: (133-206)/() 176/93     Weight: (!) 168.9 kg (372 lb 5.7 oz)  Body mass index is 53.43 kg/m².    SpO2: (!) 93 %         Intake/Output Summary (Last 24 hours) at 5/14/2025 0858  Last data filed at 5/14/2025 0603  Gross per 24 hour   Intake 480 ml   Output 3375 ml   Net -2895 ml       Lines/Drains/Airways       Peripheral Intravenous Line  Duration                  Peripheral IV - Single Lumen 05/13/25 1530 18 G Posterior;Right Forearm <1 day                     Physical Exam  Vitals reviewed.   Constitutional:       Appearance: He is obese. He is ill-appearing.   HENT:      Head: Normocephalic and atraumatic.      Mouth/Throat:      Mouth: Mucous membranes are moist.      Pharynx: Oropharynx is clear.   Eyes:      General: No scleral icterus.     Extraocular Movements: Extraocular movements intact.      Conjunctiva/sclera: Conjunctivae normal.   Cardiovascular:      Rate and Rhythm: Regular rhythm. Tachycardia present.      Heart sounds: Normal heart sounds.   Pulmonary:      Effort: Pulmonary effort is normal.      Breath sounds: Normal breath sounds. No wheezing or rhonchi.   Abdominal:      General: There is distension.       Tenderness: There is no abdominal tenderness.   Musculoskeletal:      Right lower leg: Edema (2+ to knee) present.      Left lower leg: Edema (2+ to knee) present.   Skin:     General: Skin is warm and dry.   Neurological:      Mental Status: He is alert and oriented to person, place, and time. Mental status is at baseline.   Psychiatric:         Mood and Affect: Mood normal.         Behavior: Behavior normal.         Thought Content: Thought content normal.          Significant Labs: BMP:   Recent Labs   Lab 05/13/25  1529 05/14/25  0539   * 88    136   K 3.8 4.6    103   CO2 21* 22*   BUN 28* 28*   CREATININE 1.7* 1.7*   CALCIUM 9.1 9.1   MG 2.0 2.0   , CMP   Recent Labs   Lab 05/13/25  1529 05/14/25  0539    136   K 3.8 4.6    103   CO2 21* 22*   * 88   BUN 28* 28*   CREATININE 1.7* 1.7*   CALCIUM 9.1 9.1   PROT 6.5  --    ALBUMIN 3.2* 3.3*   BILITOT 0.9  --    ALKPHOS 98  --    AST 27  --    ALT 21  --    ANIONGAP 10 11   , and Troponin   Recent Labs   Lab 05/13/25  1529 05/13/25  1757   TROPONINIHS 1,759* 1,790*       Significant Imaging: Echocardiogram: Transthoracic echo (TTE) complete (Cupid Only):   Results for orders placed or performed during the hospital encounter of 11/16/24   Echo   Result Value Ref Range    LV Diastolic Volume 281.85 mL    Echo EF Estimated 15 %    LV Systolic Volume 238.49 mL    IVS 0.8 0.6 - 1.1 cm    LVIDd 7.3 (A) 3.5 - 6.0 cm    LVIDs 6.8 (A) 2.1 - 4.0 cm    LVOT diameter 2.5 cm    PW 1.6 (A) 0.6 - 1.1 cm    IVRT 74.22 ms    AV LVOT peak gradient 1 mmHg    LVOT mn grad 1 mmHg    LVOT peak eugenio 0.6 m/s    LVOT peak VTI 9.1 cm    RV- linares basal diam 4.7 cm    RV S' 9.54 cm/s    LA size 5.0 cm    Left Atrium Major Axis 6.29 cm    Left Atrium Minor Axis 6.11 cm    LA Vol (MOD) 151.41 mL    RA Major Axis 5.59 cm    RA Area 24.7 cm2    AV valve area 3.7 cm2    AV area by cont VTI 3.6 cm2    AV peak gradient 2.6 mmHg    AV mean gradient 1.5 mmHg  "   Ao peak arturo 0.8 m/s    Ao VTI 12.1 cm    MV Peak A Arturo 0.32 m/s    E wave deceleration time 75.83 ms    MV Peak E Arturo 0.68 m/s    E/A ratio 2.13     LV LATERAL E/E' RATIO 9.71     LV SEPTAL E/E' RATIO 17.00     TDI LATERAL 0.07 m/s    TDI SEPTAL 0.04 m/s    TV peak gradient 36 mmHg    TR Max Arturo 3.02 m/s    Ascending aorta 3.32 cm    STJ 3.08 cm    P vein A 0.1 m/s    MV "A" wave duration 142.72 ms    Pulm vein "A" wave 142.72 ms    PV Peak D Arturo 0.23 m/s    PV Peak S Arturo 0.17 m/s    IVC diameter 2.74 cm    Sinus 3.68 cm    LA WIDTH 5.17 cm    RA Width 5.24 cm    TAPSE 1.30 cm    BSA 2.77 m2    LVOT stroke volume 44.6 cm3    LV Systolic Volume Index 90.7 mL/m2    LV Diastolic Volume Index 107.17 mL/m2    LVOT area 4.9 cm2    FS 6.8 (A) 28 - 44 %    Left Ventricle Relative Wall Thickness 0.44 cm    LV mass 436.3 g    LV Mass Index 165.9 g/m2    E/E' ratio 12.36 m/s    PHOENIX 51.8 mL/m2    LA Vol 136.20 cm3    Pulm vein S/D ratio 0.74     RV/LV Ratio 0.64 cm    PHOENIX (MOD) 57.6 mL/m2    AV Velocity Ratio 0.75     AV index (prosthetic) 0.75     KATIE by Velocity Ratio 3.7 cm²    Triscuspid Valve Regurgitation Peak Gradient 36 mmHg    Mean e' 0.06 m/s    ZLVIDS -4.39     ZLVIDD -10.21     TV resting pulmonary artery pressure 51 mmHg    RV TB RVSP 18 mmHg    Est. RA pres 15 mmHg    TASV 9.0 cm/s    Narrative      Left Ventricle: The left ventricle is severely dilated. Normal wall   thickness. There is concentric hypertrophy. Severe global hypokinesis   present. There is severely reduced systolic function with a visually   estimated ejection fraction of 10 -15%. Grade II diastolic dysfunction.    Right Ventricle: Mild right ventricular enlargement. Systolic function   is moderately reduced.    Severe biatrial enlargement    Mitral Valve: There is mild regurgitation.    Pulmonary Artery: There is moderate pulmonary hypertension. The   estimated pulmonary artery systolic pressure is 51 mmHg.    IVC/SVC: Elevated venous " pressure at 15 mmHg.       EKG: Sinus tachycardia     X-Ray: CXR: X-Ray Chest 1 View (CXR): No results found for this visit on 05/13/25.  Assessment and Plan:     Non-ischemic cardiomyopathy  Patient with non-ischemic cardiomyopathy with EF 10-15% in November 2024 and 30% on recent echo 4/30/25. Seen by HTS but no option for LVAD or transplant at this time due to BMI. Patient with recent hospitalization for heart failure in [place] with improvement in symptoms, but symptoms of SOB and orthopnea returned after 1 day which lead to this presentation. Patient also with ALMAS on CKD in setting of heart failure exacerbation; however, GFR 57 so can safely resume GDMT. Troponin elevated on admission but repeat troponin stable. Patient without chest pain and no EKG changes so not ACS and likely troponin elevation due to heart failure exacerbation. BNP >4,900.     Recommendations:   - Low sodium diet, strict I/O, fluid restriction 1.5 L   - Resume GDMT as BP allows including spironolactone and Entresto   - Continue diuresing with IV lasix gtt  - Echocardiography pending, added contrast to evaluate for LV thrombus given prior comment on July 2024 echo   - Follow up outpatient with cardiologist         VTE Risk Mitigation (From admission, onward)           Ordered     enoxaparin injection 60 mg  Every 12 hours         05/13/25 2048     IP VTE HIGH RISK PATIENT  Once         05/13/25 2048     Place sequential compression device  Until discontinued         05/13/25 2048                    Thank you for your consult.     Justin Tao MD  Cardiology   [facility] - [facility] Step[facility]

## 2025-05-14 NOTE — NURSING
Patient educated on safety. Patient was informed by nurse that he have to call for assistance for transfers. Patient has safety camera in room. Saurav light in reach.

## 2025-05-14 NOTE — ASSESSMENT & PLAN NOTE
-suspect cardiorenal etiology  -will hold ENTRESTO & SPIRONOLACTONE with admission     Check UA and Urine protein/Cr

## 2025-05-14 NOTE — ASSESSMENT & PLAN NOTE
Patient with non-ischemic cardiomyopathy with EF 10-15% in November 2024 and 30% on recent echo 4/30/25. Seen by HTS but no option for LVAD or transplant at this time due to BMI. Patient with recent hospitalization for heart failure in [place] with improvement in symptoms, but symptoms of SOB and orthopnea returned after 1 day which lead to this presentation. Patient also with ALMAS on CKD in setting of heart failure exacerbation; however, GFR 57 so can safely resume GDMT. Troponin elevated on admission but repeat troponin stable. Patient without chest pain and no EKG changes so not ACS and likely troponin elevation due to heart failure exacerbation. BNP >4,900.     Recommendations:   - Low sodium diet, strict I/O, fluid restriction 1.5 L   - Resume GDMT as BP allows including spironolactone and Entresto   - Continue diuresing with IV lasix gtt  - Echocardiography pending, added contrast to evaluate for LV thrombus given prior comment on July 2024 echo   - Follow up outpatient with cardiologist

## 2025-05-14 NOTE — PROGRESS NOTES
[facility] - Cardiology [facility] Medicine  Progress Note    Patient Name: Michael Conti  MRN: [id number]  Patient Class: IP- Inpatient   Admission Date: 5/13/2025  Length of Stay: 1 days  Attending Physician: Miladis Cervantes MD  Primary Care Provider: Lele Verde NP (Inactive)    Principal Problem:Acute on chronic combined systolic and diastolic heart failure    Interval history: Patient feels significant anxiety after compazine given. He fell while ambulating around despite generalized weakness. He denies head injury or any pain. Patient has difficulty following fluid restriction. Nursing reports patient is impulsive on previous admission where he has been found on the ground and drinking out of faucets    PEx  Temp:  [97.6 °F (36.4 °C)-98.9 °F (37.2 °C)]   Pulse:  [104-126]   Resp:  [14-22]   BP: (133-176)/()   SpO2:  [92 %-99 %]     Intake/Output Summary (Last 24 hours) at 5/14/2025 1450  Last data filed at 5/14/2025 1449  Gross per 24 hour   Intake 765 ml   Output 3875 ml   Net -3110 ml       General Appearance: no acute distress, WD, obese  Heart: regular rate and rhythm, no heave; edema 4+ non-pitting up to upper buttock  Respiratory: Normal respiratory effort, symmetric excursion, bilateral vesicular breath sounds   Abdomen: Soft, non-tender; bowel sounds active  Skin: intact, no rash, no ulcers  Neurologic:  No focal numbness or weakness  Mental status: Alert, oriented x 4, affect appropriate    Recent Labs   Lab 05/11/25  0725 05/13/25  1529 05/14/25  0539   WBC 8.88 7.92 8.27   HGB 13.7* 11.7* 11.7*   HCT 45.3 36.4* 38.5*    266 270     Recent Labs   Lab 05/07/25 2033 05/09/25  0604 05/11/25  0725 05/13/25  1529 05/14/25  0539      < > 143 137 136   K 4.3   < > 4.2 3.8 4.6      < > 104 106 103   CO2 21*   < > 24 21* 22*   BUN 24.6*   < > 19.0 28* 28*   CREATININE 1.74*   < > 1.38* 1.7* 1.7*   GLU 89   < > 102* 114* 88   CALCIUM 8.5   < > 9.3 9.1 9.1   MG 1.80  --   --   2.0 2.0   PHOS  --   --   --   --  4.6*    < > = values in this interval not displayed.     Recent Labs   Lab 05/10/25  0533 05/11/25  0725 05/13/25  1529 05/14/25  0539   ALKPHOS 93 107 98  --    ALT 16 16 21  --    AST 22 20 27  --    ALBUMIN 3.3* 3.5 3.2* 3.3*   PROT 6.4 7.0 6.5  --    BILITOT 0.9 0.9 0.9  --    INR  --   --   --  1.4*        Recent Labs   Lab 05/11/25  1111   POCTGLUCOSE 135*       Scheduled Meds:   enoxparin  60 mg Subcutaneous Q12H    metoprolol succinate  100 mg Oral BID     Continuous Infusions:   furosemide (Lasix) 500 mg in 50 mL infusion (conc: 10 mg/mL)  15 mg/hr Intravenous Continuous   Paused at 05/14/25 1429     As Needed:    Current Facility-Administered Medications:     acetaminophen, 650 mg, Oral, Q4H PRN    aluminum-magnesium hydroxide-simethicone, 30 mL, Oral, QID PRN    dextromethorphan-guaiFENesin  mg/5 ml, 10 mL, Oral, Q4H PRN    hydrOXYzine pamoate, 25 mg, Oral, Q6H PRN    melatonin, 6 mg, Oral, Nightly PRN    metoclopramide, 10 mg, Intravenous, Q6H PRN    naloxone, 0.02 mg, Intravenous, PRN    nitroGLYCERIN, 0.4 mg, Sublingual, Q5 Min PRN    ondansetron, 4 mg, Intravenous, Q8H PRN    polyethylene glycol, 17 g, Oral, Daily PRN    senna-docusate, 1 tablet, Oral, BID PRN    sodium chloride 0.9%, 10 mL, Intravenous, PRN    traZODone, 50 mg, Oral, Nightly PRN    Assessment & Plan  Acute on chronic combined systolic and diastolic heart failure  -admit to cardiac stepdown unit - tele monitoring  -start heart failure pathway   -Consult cardiology in AM  - He may require HTS consultation discuss with Gen Cardiology team in Am  -obtain ECHO   -Fluid/salt restriction   -c/o of ongoing dyspnea on arrival to floor - will escalate with another 80mg IV lasix tonight + Lasix infusion 10mg/hr   -montior chemistry - may need BID checks on lasix infusion if it continues.   -give potassium & magnesium tonight     Decreasing UOP - bolus furosemide 80 mg IV and increase lasix 15  mg/hr  Educated on fluid restrictions  Non-ischemic cardiomyopathy  Hold verquvo not on hospital formulary     Continue ToprolXL     Discuss w/ cardiology re: restarting Farxiga    IF creatinine improves restart Entresto/Spironolactone as it was continued during recent IP admission.   Elevated serum creatinine  -suspect cardiorenal etiology  -will hold ENTRESTO & SPIRONOLACTONE with admission     Check UA and Urine protein/Cr  Severe obesity (BMI >= 40)  Body mass index is 53.43 kg/m². Morbid obesity complicates all aspects of disease management from diagnostic modalities to treatment.   Primary hypertension  Management as per primary problem.   Microcytic anemia  -check Iron studies and additional anemia panel labs   -treat any deficient conditions found.   Fall    VTE Risk Mitigation (From admission, onward)           Ordered     enoxaparin injection 60 mg  Every 12 hours         05/13/25 2048     IP VTE HIGH RISK PATIENT  Once         05/13/25 2048     Place sequential compression device  Until discontinued         05/13/25 2048                  Discharge Planning   YU: 5/17/2025     Code Status: Full Code   Medical Readiness for Discharge Date:   Discharge Plan A: Home with family        Miladis Cervantes MD  Department of Hospital Medicine   [facility]

## 2025-05-14 NOTE — NURSING NOTE
Pt arrived to echo lab without IV access.  IV access attempted x 2 without success- able to access vein, but vein blows upon threading catheter.  Unable to do contrast with echo.  Pts nurse notified.

## 2025-05-14 NOTE — PT/OT/SLP EVAL
Occupational Therapy  Co- Evaluation    Name: Michael Conti  MRN: [id number]  Admitting Diagnosis: Acute on chronic combined systolic and diastolic heart failure  Recent Surgery: * No surgery found *      Recommendations:     Discharge Recommendations: No Therapy Indicated  Discharge Equipment Recommendations:  shower chair  Barriers to discharge:  None    Assessment:     Michael Conti is a 24 y.o. male with a medical diagnosis of Acute on chronic combined systolic and diastolic heart failure as well as recent fall from reported light-headedness.  He presents with decreased endurance and significant episode of dry heaving following ambulation on this date. Nurse notified and came to assess patient. Pt. Able to perform functional  mobility x  60 feet without an AD and standing rest break then 60 feet back to room with chair follow. Once seated in chair pt. Began significant coughing/dry heaving episode and lowered himself independently  from chair to half kneel position with trunk forward on bed to assist with better positioning for breathing. Once pt. [place] better he independently transitioned from half kneel back to bed.  . Performance deficits affecting function: weakness, impaired endurance, impaired self care skills, impaired functional mobility, gait instability, impaired cardiopulmonary response to activity.  Co-eval to ensure pt. Safety and to accommodate pt. Anticipated activity tolerance    Rehab Prognosis: Good; patient would benefit from acute skilled OT services to address these deficits and reach maximum level of function.       Plan:     Patient to be seen 2 x/week to address the above listed problems via self-care/home management, therapeutic activities, therapeutic exercises  Plan of Care Expires: 05/21/25  Plan of Care Reviewed with: patient    Subjective     Chief Complaint: pt. Reported he has episodes like this (referring to dry heaving) and he breathes better in the position he put himself in. He  reported the episodes typically last 10 minutes  Patient/Family Comments/goals: to feel better    Occupational Profile:  Living Environment: pt. Resides with his grandmother in a sss house with no steps. Pt. Has a WIS with grab bars  Previous level of function: pt. Reports independent with ADL tasks and mobility but unable to walk too far  Roles and Routines: caretaker of self, grandson, community dweller; likes to throw the football with neighbors  Equipment Used at Home: grab bar  Assistance upon Discharge: grandmother can assist    Pain/Comfort:  Pain Rating 1: 5/10  Location - Side 1: Bilateral  Location 1: ankle  Pain Addressed 1: Reposition, Distraction    Patients cultural, spiritual, Mu-ism conflicts given the current situation: no    Objective:     Communicated with: nurse prior to session.  Patient found up in chair with telemetry upon OT entry to room.    General Precautions: Standard, fall (cardiac)  Orthopedic Precautions: N/A  Braces: N/A  Respiratory Status: Room air    Occupational Performance:    Bed Mobility:    Not tested as pt. Was sitting up in chair    Functional Mobility/Transfers:  Patient completed Sit <> Stand Transfer with independence  with  no assistive device   Functional Mobility: pt. Performed functional mobility in hallway with no AD and Supervision /SBA with chair follow x  60 feet and then ambulated 60 feet back to room with chair follow and Supervision/SBA and using guard rail at times for support    Activities of Daily Living:  Pt. Reported ambulating to and from the bathroom in room independently earlier on this date    Cognitive/Visual Perceptual:  Cognitive/Psychosocial Skills:     -       Oriented to: Person, Place, Time, and Situation   -       Follows Commands/attention:Follows multistep  commands  -       Communication: clear/fluent  -       Memory: No Deficits noted  -       Safety awareness/insight to disability: intact   -       Mood/Affect/Coping skills/emotional  control: Appropriate to situation  Visual/Perceptual:      -Intact      Physical Exam:  Balance: -       sit: good; stand: supervision  Postural examination/scapula alignment:    -       Rounded shoulders  -       Posterior pelvic tilt  Skin integrity: Visible skin intact  Upper Extremity Range of Motion:     -       Right Upper Extremity: WNL  -       Left Upper Extremity: WNL  Upper Extremity Strength:    -       Right Upper Extremity: WNL  -       Left Upper Extremity: WNL   Strength:    -       Right Upper Extremity: WNL  -       Left Upper Extremity: WNL    AMPAC 6 Click ADL:  AMPAC Total Score: 23    Treatment & Education:  Pt. Educated on role of OT and pOC    Patient left up in chair with all lines intact, call button in reach, and nurse present    GOALS:   Multidisciplinary Problems       Occupational Therapy Goals          Problem: Occupational Therapy    Goal Priority Disciplines Outcome Interventions   Occupational Therapy Goal     OT, PT/OT Progressing    Description: Goals to be met by: 05-21-25     Patient will increase functional independence with ADLs by performing:    UE Dressing with Riley.  Grooming while standing with Riley.  Toileting from toilet with Riley for hygiene and clothing management.   Step transfer with Riley  Toilet transfer to toilet with Riley.  Pt. To state 3 energy conservation techniques to implement in home environment with ADL/IADL task performance                         DME Justifications:  No DME recommended requiring DME justifications    History:     Past Medical History:   Diagnosis Date    Acute non-ST segment elevation myocardial infarction 05/13/2025    CHF (congestive heart failure)     Hypertension     Hypertensive emergency 05/13/2025         Past Surgical History:   Procedure Laterality Date    CARDIAC CATHETERIZATION         Time Tracking:     OT Date of Treatment: 05/14/25  OT Start Time: 1002  OT Stop Time: 1025  OT Total  Time (min): 23 min    Billable Minutes:Evaluation 23 5/14/2025

## 2025-05-14 NOTE — ASSESSMENT & PLAN NOTE
-admit to cardiac stepdown unit - tele monitoring  -start heart failure pathway   -Consult cardiology in AM  - He may require HTS consultation discuss with Gen Cardiology team in Am  -obtain ECHO   -Fluid/salt restriction   -c/o of ongoing dyspnea on arrival to floor - will escalate with another 80mg IV lasix tonight + Lasix infusion 10mg/hr   -montior chemistry - may need BID checks on lasix infusion if it continues.   -give potassium & magnesium tonight     Decreasing UOP - bolus furosemide 80 mg IV and increase lasix 15 mg/hr  Educated on fluid restrictions

## 2025-05-14 NOTE — PLAN OF CARE
Pt free of falls/trauma/injuries.  Denies c/o SOB, CP, or discomfort.  Generalized skin remains CDI; ble edema noted.  Pt being diuresed with Lasix 40mg gtts contoniuous; diuresing well, remains stable.  Pt tolerating plan of care.         Problem: Adult Inpatient Plan of Care  Goal: Plan of Care Review  Outcome: Progressing  Goal: Patient-Specific Goal (Individualized)  Outcome: Progressing  Goal: Absence of Hospital-Acquired Illness or Injury  Outcome: Progressing  Goal: Optimal Comfort and Wellbeing  Outcome: Progressing  Goal: Readiness for Transition of Care  Outcome: Progressing     Problem: Bariatric Environmental Safety  Goal: Safety Maintained with Care  Outcome: Progressing     Problem: Infection  Goal: Absence of Infection Signs and Symptoms  Outcome: Progressing     Problem: Wound  Goal: Optimal Coping  Outcome: Progressing  Goal: Optimal Functional Ability  Outcome: Progressing  Goal: Absence of Infection Signs and Symptoms  Outcome: Progressing  Goal: Improved Oral Intake  Outcome: Progressing  Goal: Optimal Pain Control and Function  Outcome: Progressing  Goal: Skin Health and Integrity  Outcome: Progressing  Goal: Optimal Wound Healing  Outcome: Progressing     Problem: Fall Injury Risk  Goal: Absence of Fall and Fall-Related Injury  Outcome: Progressing

## 2025-05-14 NOTE — PROGRESS NOTES
[facility] Failure [facility] (H[facility]) Team notified of pt referral via [facility] (automated inbasket notification) .    Patient screened today 5- by provider and RN for enrollment to program.      Pt was deemed not a candidate for enrollment at this time related to patient is located outside of [facility] area and will need to follow up with local services.  [facility] Clinic ([id number]) is [facility] location only.  PT lives in [place], [place] but desires  HFTCC message sent to  Clinic.    Pt will require additional follow up planning per primary team.   PT educated as follows;  Reviewed the following key points of HFTCC program with pt and family:   1.) Take your medications as directed.    2.) Weight yourself daily   3.) Follow low salt and limited fluid diet.    4.) Stop smoking and start exercising   5.) Go to your appointments and call your team.    Explained that the HFTCC is a 31 day program designed to educate PTs on Fluid balance in order to help them stay out of the Hospital for CHF. PT states he knows that his EF is 10% and is scheduled for another ECHO.  If pt status, diagnosis, or treatment plan changes , please place AMB referral to [facility] ([id number]) for HFTCC enrollment re-evalution.

## 2025-05-14 NOTE — PLAN OF CARE
[facility] - Cardiology Stepdown  Initial Discharge Assessment       Primary Care Provider: Lele Verde NP (Inactive)    Admission Diagnosis: SOB (shortness of breath) [R06.02]  Acute on chronic combined systolic and diastolic congestive heart failure [I50.43]  Chest pain [R07.9]  Falls [R29.6]    Admission Date: 5/13/2025  Expected Discharge Date: 5/17/2025    Transition of Care Barriers: Underinsured, Bariatric    Payor: MEDICAID / Plan: [facility] COMMUNITY PLAN [facility] ([place] MEDICAID) / Product Type: Managed Medicaid /     Extended Emergency Contact Information  Primary Emergency Contact: Sherlyn Conti  Mobile Phone: [phone]  Relation: Grandparent  Preferred language: English   needed? No  Secondary Emergency Contact: Colette Conti  Mobile Phone: [phone]  Relation: Relative    Discharge Plan A: Home with family  Discharge Plan B: Home Health      [facility] [place] - [address]  [address] [place] [address]  Phone: [phone] Fax: [phone]      Initial Assessment (most recent)       Adult Discharge Assessment - 05/14/25 1118          Discharge Assessment    Assessment Type Discharge Planning Assessment     Confirmed/corrected address, phone number and insurance Yes     Confirmed Demographics Correct on Facesheet     Source of Information patient;health record     Communicated YU with patient/caregiver Date not available/Unable to determine     Reason For Admission Acute CHF     People in Home grandparent(s)     Do you expect to return to your current living situation? Yes     Do you have help at home or someone to help you manage your care at home? Yes     Who are your caregiver(s) and their phone number(s)? Sherlyn Conti (grandmother) [phone]     Prior to hospitilization cognitive status: Alert/Oriented     Current cognitive status: Alert/Oriented     Walking or Climbing Stairs Difficulty no     Dressing/Bathing Difficulty no     Home Layout Able  "to live on 1st floor     Equipment Currently Used at Home none     Readmission within 30 days? Yes     Patient currently being followed by outpatient case management? No     Do you currently have service(s) that help you manage your care at home? No     Do you take prescription medications? Yes     Do you have prescription coverage? Yes     Do you have any problems affording any of your prescribed medications? No     Is the patient taking medications as prescribed? yes     Who is going to help you get home at discharge? TBD     How do you get to doctors appointments? car, drives self;family or friend will provide     Are you on dialysis? No     Do you take coumadin? No     Discharge Plan A Home with family     Discharge Plan B Home Health     Discharge Plan discussed with: Patient     Transition of Care Barriers Underinsured;Bariatric                     Readmission Assessment (most recent)       Readmission Assessment - 05/14/25 1120          Readmission    Was this a planned readmission? No     Why were you hospitalized in the last 30 days? Acute CHF     Why were you readmitted? Alarmed about signs/symptoms     When you left the hospital how did you feel? "Better"     When you left the hospital where did you go? Home with Family     Did patient/caregiver refused recommended DC plan? No     Tell me about what happened between when you left the hospital and the day you returned. Pt felt he needed more diuresis     When did you start not feeling well? SInce the day he left the hospital from his last admission     Did you try to manage your symptoms your self? Yes     What did you do? Took meds     Did you call anyone? No     Did you try to see or did see a doctor or nurse before you came? No                    SW met with pt at bedside to discuss discharge planning.  Pt lives with his grandparents and is independent with ambulation and ADLs.  No DME, HH, dialysis, or coumadin.  PCP is Lele Verde NP.  Pt stated he " is not sure how he will get home at discharge.  Discharge Plan A and Plan B have been determined by review of patient's clinical status, future medical and therapeutic needs, and coverage/benefits for post-acute care in coordination with multidisciplinary team members.  SW name and ext on whiteboard; discharge planning booklet provided.  Will continue to follow.      Hellen Vicente LMSW  [facility]  [id number]

## 2025-05-14 NOTE — HPI
Michael Conti is a 24 year old male with non-Ischemic cardiomyopathy (HFrEF EF 30%) s/p AICD, HTN, CKD, and severe obesity, presents to ED with shortness of breath, orthopnea.      He was admitted to [facility] from - for management of heart failure exacerbation. Primary Cardiologist is Dr. Frannie García ([facility]) during admission he was treated with IV lasix, IV milrinone, restarted on Toprol 100mg, Entresto & Aldactone 2 days into admission. At discharge started on Torsemide 100mg & Verquvo 2.5mg daily.  Farxiga & Bidil were held during inpatient admission. He reported that as an outpatient Bidil/Entresto/Spironolactone were on hold by his cardiologist.  He reports feeling well the day after discharge and has continued Toprol, Farxiga, Torsemide, Verquvo. He has had prior left heart cath with normal coronary vessels. Patient notes that his mother  in her 40's from heart failure, and notes that she had lupus.      He reported started feeling dyspnea, orthopnea, WILLIS w/ pre-syncope symptoms on oral regimen, reports his dry weight is 340. Patient presented to [facility] yesterday for evaluation due to prior admission here, felt he requires more diuresis.  He has no chest pain complaints, unclear if he has had increase dietary indiscretion.  He is concerned about how long he will be admitted to ensure adequate diuresis. Labs significant for elevated troponin, which is not rising (1,790-->1,759), BNP >4,900, and CR 1.7 (1.38 on , baseline 1.4). Given IV lasix 80 mg and then started on lasix gtt at 10 mg/hr. Patient with approximately 3 L UOP thus far.     Patient states he is still feeling short of breath and feels like his legs are swollen. Continues to deny any chest pain. Patient states his cardiomyopathy was diagnosed at age 19, but significantly worsened over the past year. Patient denies any personal history of autoimmune disease. Denies any preceding illness prior to onset of cardiac issues, and states that  it occurred after he stopped playing sports. Denies smoking or frequent alcohol use. Denies any illicit substance use.

## 2025-05-14 NOTE — NURSING
Patient admitted to [facility] from ED, via stretcher with personal belongings. No evidence of distress; patient AAO x4 at this time. Patient placed on tele. Vital signs obtained. Patient voices no complaints at this time. Plan of care initiated with patient. Bed in lowest position, locked, SR up x2, call bell in reach.

## 2025-05-14 NOTE — HPI
25 y/o AAM w/ Non-Ischemic Cardiomyopathy_HFrEF EF10-15%, s/p AICD, severe obesity, presents to ED with shortness of breath, orthopnea.     He was admitted to [facility] from 5/7-5/11 for management of heart failure exacerbation. Primary Cardiologist is Dr. Frannie García ([facility]) during admission he was treated with IV lasix, IV milrinone,  restarted on Toprol 100mg, Entresto & Aldactone 2 days into admission.  At discharge started on Torsemide 100mg & Verquvo 2.5mg daily.   Farxiga & Bidil were held during inpatient admission.  He reports that as an outpatient Bidil/Entresto/Spironolactone were on hold by his cardiologist.  He reports feeling well the day after discharge and has continued Toprol/Farxiga/Torsemide/Verquvo.   He is unsure if he has had a left heart cath in past.     He reported started feeling dyspnea, orthopnea, WILLIS w/ pre-syncope symptoms on oral regimen, reports his dry weight is 340 but current weight is still high @ 367-370 pounds, came to [facility] today for evaluation due to prior admission here, felt he requires more diuresis.  He has no chest pain complaints, unclear if he has had increase dietary indiscretion.  He is concerned about how long he will be admitted to ensure adequate diuresis.     Given lasix 80mg iV in ED and has had [id number] cc of UOP.

## 2025-05-14 NOTE — ASSESSMENT & PLAN NOTE
Hold verquvo not on hospital formulary     Continue ToprolXL     Discuss w/ cardiology re: restarting Farxiga    IF creatinine improves restart Entresto/Spironolactone as it was continued during recent IP admission.

## 2025-05-14 NOTE — NURSING
"Upon entering the patients room, this nurse found patient lying on his left side on the floor. Patient stated, "I'm sorry. I tripped over the wires." Patient connected to [place] at this time. Patient denies hitting his head. Patient was assisted to the chair by this nurse, another RN, and the PCT. Head nurse RAINA Gaspar notified, charge nurse Claudette notified. VSS.   "

## 2025-05-14 NOTE — H&P
Weston Alicia - Cardiology [facility] Medicine  History & Physical    Patient Name: Michael Conti  MRN: [id number]  Patient Class: IP- Inpatient  Admission Date: 5/13/2025  Attending Physician: Miladis Cervantes MD [facility]  Admitting Physician: Saeid Dunlap MD  Primary Care Provider: Lele Verde NP (Inactive)    Patient information was obtained from patient, past medical records, and ER records.     Subjective:     Principal Problem:Acute on chronic combined systolic and diastolic heart failure    Chief Complaint:   Chief Complaint   Patient presents with    Shortness of Breath     EF of 10%, defibrillator, recent ICU discharge from [facility]          HPI: 25 y/o AAM w/ Non-Ischemic Cardiomyopathy_HFrEF EF10-15%, s/p AICD, severe obesity, presents to ED with shortness of breath, orthopnea.     He was admitted to [facility] from 5/7-5/11 for management of heart failure exacerbation. Primary Cardiologist is Dr. Frannie García ([facility]) during admission he was treated with IV lasix, IV milrinone,  restarted on Toprol 100mg, Entresto & Aldactone 2 days into admission.  At discharge started on Torsemide 100mg & Verquvo 2.5mg daily.   Farxiga & Bidil were held during inpatient admission.  He reports that as an outpatient Bidil/Entresto/Spironolactone were on hold by his cardiologist.  He reports feeling well the day after discharge and has continued Toprol/Farxiga/Torsemide/Verquvo.   He is unsure if he has had a left heart cath in past.     He reported started feeling dyspnea, orthopnea, WILLIS w/ pre-syncope symptoms on oral regimen, reports his dry weight is 340 but current weight is still high @ 367-370 pounds, came to [facility] [place] for evaluation due to prior admission here, felt he requires more diuresis.  He has no chest pain complaints, unclear if he has had increase dietary indiscretion.  He is concerned about how long he will be admitted to ensure adequate diuresis.     Given lasix 80mg iV in ED  and has had 3216-6456 cc of UOP.             Past Medical History:   Diagnosis Date    Acute non-ST segment elevation myocardial infarction 05/13/2025    CHF (congestive heart failure)      Hypertension      Hypertensive emergency 05/13/2025               Past Surgical History:   Procedure Laterality Date    CARDIAC CATHETERIZATION             Review of patient's allergies indicates:  No Known Allergies     No current facility-administered medications on file prior to encounter.           Current Outpatient Medications on File Prior to Encounter   Medication Sig    FARXIGA 10 mg tablet Take 1 tablet (10 mg total) by mouth once daily.    isosorbide-hydrALAZINE 20-37.5 mg (BIDIL) 20-37.5 mg Tab Take 1 tablet by mouth 3 (three) times daily.    metoprolol succinate (TOPROL-XL) 100 MG 24 hr tablet Take 1 tablet (100 mg total) by mouth 2 (two) times daily.    nitroGLYCERIN (NITROSTAT) 0.4 MG SL tablet Place 0.4 mg under the tongue every 5 (five) minutes as needed.    spironolactone (ALDACTONE) 25 MG tablet Take 1 tablet (25 mg total) by mouth once daily.    torsemide (DEMADEX) 100 MG Tab Take 100 mg by mouth once daily.    traZODone (DESYREL) 50 MG tablet Take 50 mg by mouth nightly as needed.    vericiguat (VERQUVO) 2.5 mg Tab Take 1 tablet (2.5 mg total) by mouth once daily.    sacubitriL-valsartan (ENTRESTO) 49-51 mg per tablet Take 1 tablet by mouth 2 (two) times daily.      Family History    None               Tobacco Use    Smoking status: Never       Passive exposure: Never    Smokeless tobacco: Never   Substance and Sexual Activity    Alcohol use: Yes    Drug use: Never    Sexual activity: Not on file      Review of Systems   Constitutional:  Positive for activity change and fatigue. Negative for appetite change.   HENT: Negative.     Respiratory:  Positive for shortness of breath.    Cardiovascular:  Positive for leg swelling.   Gastrointestinal: Negative.  Negative for nausea and vomiting.   Genitourinary:  "Negative.    Musculoskeletal: Negative.    Skin:  Negative for wound.   Neurological:  Positive for weakness.      Objective:      Vital Signs (Most Recent):  Temp: 98.3 °F (36.8 °C) (25 0003)  Pulse: (!) 113 (25 0027)  Resp: (!) 22 (25 0003)  BP: (!) 140/89 (25 0130)  SpO2: 97 % (25 0003) Vital Signs (24h Range):  Temp:  [97.4 °F (36.3 °C)-98.3 °F (36.8 °C)] 98.3 °F (36.8 °C)  Pulse:  [107-126] 113  Resp:  [14-22] 22  SpO2:  [95 %-99 %] 97 %  BP: (133-206)/() 140/89      Weight: (!) 168.9 kg (372 lb 5.7 oz)  Body mass index is 53.43 kg/m².     Physical Exam  Vitals and nursing note reviewed.   Constitutional:       Appearance: He is obese. He is ill-appearing.   HENT:      Head: Normocephalic and atraumatic.   Eyes:      General: No scleral icterus.  Neck:      Comments: Unable to note JVP  Cardiovascular:      Rate and Rhythm: Regular rhythm. Tachycardia present.      Heart sounds: No murmur heard.     Comments: Anasarca present  Pulmonary:      Effort: Pulmonary effort is normal. No respiratory distress.      Breath sounds: Examination of the right-lower field reveals decreased breath sounds. Examination of the left-lower field reveals decreased breath sounds. Decreased breath sounds present. No wheezing.   Musculoskeletal:      Right lower le+ Pitting Edema present.      Left lower le+ Pitting Edema present.   Skin:     General: Skin is warm and dry.   Neurological:      General: No focal deficit present.      Mental Status: He is alert.                  Significant Labs: All pertinent labs within the past 24 hours have been reviewed.  ABGs: No results for input(s): "PH", "PCO2", "HCO3", "POCSATURATED", "BE", "TOTALHB", "COHB", "METHB", "O2HB", "POCFIO2", "PO2" in the last 48 hours.  CBC:       Recent Labs   Lab 25  1529   WBC 7.92   HGB 11.7*   HCT 36.4*         CMP:       Recent Labs   Lab 25  1529      K 3.8      CO2 21*   * " "  BUN 28*   CREATININE 1.7*   CALCIUM 9.1   PROT 6.5   ALBUMIN 3.2*   BILITOT 0.9   ALKPHOS 98   AST 27   ALT 21   ANIONGAP 10      Cardiac Markers:       Recent Labs   Lab 05/13/25  1529   BNP >4,900*      Coagulation: No results for input(s): "PT", "INR", "APTT" in the last 48 hours.  Lactic Acid:       Recent Labs   Lab 05/13/25  1906   LACTATE 1.0      Magnesium:       Recent Labs   Lab 05/13/25  1529   MG 2.0      Troponin:        Recent Labs   Lab 05/13/25  1529 05/13/25  1757   TROPONINIHS 1,759* 1,790*      Urine Studies:       Recent Labs   Lab 05/13/25  2227   COLORU Yellow   APPEARANCEUA Clear   PHUR 7.0   SPECGRAV 1.005   PROTEINUA 1+*   GLUCUA Negative   BILIRUBINUA Negative   OCCULTUA Negative   NITRITE Negative   UROBILINOGEN Negative   LEUKOCYTESUR Negative   RBCUA 1   WBCUA <1   BACTERIA Rare   HYALINECASTS 0         Significant Imaging:      CT HEAD WITHOUT CONTRAST; CT CERVICAL SPINE WITHOUT CONTRAST     CLINICAL HISTORY:  Fall and on Eliquis;; Fall with head trauma;     TECHNIQUE:  Low dose axial CT images obtained throughout the head and cervical spine without intravenous contrast. Sagittal and coronal reconstructions were performed.     COMPARISON:  Chest radiograph 05/07/2025     FINDINGS:  Head CT:     Intracranial compartment: Brain appears normally formed. Ventricles and sulci are normal in size for age without evidence of hydrocephalus. No extra-axial blood or fluid collections.     The brain parenchyma appears normal. No parenchymal mass, hemorrhage, edema or major vascular distribution infarct.     Skull/extracranial contents (limited evaluation): Small focus of subcutaneous fat stranding overlying the right parietal calvarium suggesting small scalp contusion.  No fracture. Mastoid air cells and paranasal sinuses are essentially clear.  Imaged portions of the orbits are within normal limits.     Cervical spine CT: Straightening of the cervical lordosis.  Well corticated suspected " anatomic variant at the left transverse process of C1.  Vertebral body and intervertebral disc space heights appear relatively maintained.  Bones are well mineralized.  Dens and lateral masses are well aligned and intact.  No displaced fracture, dislocation or significant listhesis.  No destructive osseous process.  No significant degenerative change, spinal canal stenosis or neural foraminal narrowing seen at any level.  No prevertebral soft tissue thickening.  No paraspinal mass or fluid collection.  No subcutaneous emphysema or radiopaque foreign body.     Partially imaged catheter seen within the left subclavian vein.  Left thyroid is asymmetrically larger and heterogeneous suggesting underlying nodules.  There is associated mass effect with mild rightward shift and narrowing of the trachea which remains patent.  Imaged lung apices are clear.     Impression:     1. Suspected right parietal scalp small soft tissue contusion without displaced skull fracture or acute intracranial abnormality identified.  2. No CT evidence of cervical spine acute osseous traumatic injury.  3. Enlarged heterogeneous thyroid suggesting underlying nodules.  Further evaluation with elective/nonemergent thyroid ultrasound can be obtained as warranted.  This report was flagged in Epic as containing an incidental finding.        Electronically signed by: Justin Lew MD  Date:                                            05/13/2025  Time:                                           20:59        XR CHEST AP PORTABLE     CLINICAL HISTORY:  Chest Pain;     TECHNIQUE:  Single frontal view of the chest was performed.     COMPARISON:  Radiographs of the chest from 05/07/2025     FINDINGS:  Heart size is enlarged.  Pulmonary vasculature within normal limits.  A left subclavian single lead cardiac conduction device is present with lead terminations overlying the right ventricle.  No evidence of focal consolidation, pneumothorax, or pleural effusion.   No evidence of acute osseous process.     Impression:     Cardiomegaly without additional evidence of acute cardiopulmonary process.        Electronically signed by: Marco A Benson  Date:                                            05/13/2025  Time:                                           18:24  Assessment/Plan:       Assessment/Plan:     Assessment & Plan  Acute on chronic combined systolic and diastolic heart failure  -admit to cardiac stepdown unit - tele monitoring  -start heart failure pathway   -Consult cardiology in AM  - He may require HTS consultation discuss with Gen Cardiology team in Am  -obtain ECHO   -Fluid/salt restriction   -c/o of ongoing dyspnea on arrival to floor - will escalate with another 80mg IV lasix tonight + Lasix infusion 10mg/hr   -montior chemistry - may need BID checks on lasix infusion if it continues.   -give potassium & magnesium tonight     Non-ischemic cardiomyopathy  Hold verquvo not on hospital formulary     Continue ToprolXL     Discuss w/ cardiology re: restarting Farxiga    IF creatinine improves restart Entresto/Spironolactone as it was continued during recent IP admission.   Elevated serum creatinine  -suspect cardiorenal etiology  -will hold ENTRESTO & SPIRONOLACTONE with admission     Check UA and Urine protein/Cr  Severe obesity (BMI >= 40)  Body mass index is 53.43 kg/m². Morbid obesity complicates all aspects of disease management from diagnostic modalities to treatment.   Primary hypertension  Management as per primary problem.         Microcytic anemia  -check Iron studies and additional anemia panel labs   -treat any deficient conditions found.       Fall   -PT/OT consult      VTE Risk Mitigation (From admission, onward)           Ordered     enoxaparin injection 60 mg  Every 12 hours         05/13/25 2048     IP VTE HIGH RISK PATIENT  Once         05/13/25 2048     Place sequential compression device  Until discontinued         05/13/25 2048                          Saeid Dunlap MD  Department of [facility] Medicine  [facility] - Cardiology Stepdown

## 2025-05-14 NOTE — PROGRESS NOTES
Pharmacist Dose Adjustment Note    Michael Conti is a 24 y.o. male being treated with enoxaparin 40 mg every 12 hours     Patient Data:    Vital Signs (Most Recent):  Temp: 97.8 °F (36.6 °C) (05/13/25 2052)  Pulse: 110 (05/13/25 2052)  Resp: 16 (05/13/25 2052)  BP: (!) 170/81 (05/13/25 2052)  SpO2: 98 % (05/13/25 2052) Vital Signs (72h Range):  Temp:  [97.4 °F (36.3 °C)-97.8 °F (36.6 °C)]   Pulse:  [110-126]   Resp:  [14-20]   BP: (133-206)/()   SpO2:  [95 %-99 %]      Recent Labs   Lab 05/10/25  0533 05/11/25  0725 05/13/25  1529   CREATININE 1.26* 1.38* 1.7*     Serum creatinine: 1.7 mg/dL (H) 05/13/25 1529  Estimated creatinine clearance: 103.4 mL/min (A)    Adjusted to   Enoxaparin 60 mg every 12 hours    Pharmacist's Name: Cheri Stoddard  Pharmacist's Extension: [phone]

## 2025-05-14 NOTE — NURSING
Nurses Note -- 4 Eyes      5/14/2025   12:22 AM      Skin assessed during: Admit      [x] No Altered Skin Integrity Present    []Prevention Measures Documented      [] Yes- Altered Skin Integrity Present or Discovered   [] LDA Added if Not in Epic (Describe Wound)   [] New Altered Skin Integrity was Present on Admit and Documented in LDA   [] Wound Image Taken    Wound Care Consulted? No    Attending Nurse:  Pedro Suazo RN     Second RN/Staff Member: Deborah CALHOUN RN

## 2025-05-14 NOTE — NURSING
"Patient stated he tripped in room and fell. No injuries noted. MD Billy notified. Vitals stable. Patient got up off floor. Fall risk monitoring ordered. Patient stated " I did not hit my head." Camera ordered. Safety maintained.  "

## 2025-05-14 NOTE — NURSING
Patient c/o feeling SOB,patient takes oxygen on and off repeatedly. Patient educated on importance of keeping oxygen on so he won't feel SOB. Patient verbalized understanding. Will monitor.

## 2025-05-14 NOTE — ASSESSMENT & PLAN NOTE
Body mass index is 53.43 kg/m². Morbid obesity complicates all aspects of disease management from diagnostic modalities to treatment.

## 2025-05-14 NOTE — ASSESSMENT & PLAN NOTE
-admit to cardiac stepdown unit - tele monitoring  -start heart failure pathway   -Consult cardiology in AM  - He may require HTS consultation discuss with Gen Cardiology team in Am  -obtain ECHO   -Fluid/salt restriction   -c/o of ongoing dyspnea on arrival to floor - will escalate with another 80mg IV lasix tonight + Lasix infusion 10mg/hr   -montior chemistry - may need BID checks on lasix infusion if it continues.   -give potassium & magnesium tonight

## 2025-05-14 NOTE — NURSING
"Patient stated "medication for chest pain helped a little."  Nurse offered another nitro to patient, patient stated " I don't want another one. Awaiting EKG. Safety maintained.   "

## 2025-05-15 ENCOUNTER — DOCUMENTATION ONLY (OUTPATIENT)
Dept: CARDIOLOGY | Facility: CLINIC | Age: 25
End: 2025-05-15
Payer: MEDICAID

## 2025-05-15 LAB
ALBUMIN SERPL BCP-MCNC: 3.4 G/DL (ref 3.5–5.2)
ANION GAP (OHS): 14 MMOL/L (ref 8–16)
BUN SERPL-MCNC: 35 MG/DL (ref 6–20)
CALCIUM SERPL-MCNC: 9 MG/DL (ref 8.7–10.5)
CHLORIDE SERPL-SCNC: 98 MMOL/L (ref 95–110)
CO2 SERPL-SCNC: 25 MMOL/L (ref 23–29)
CREAT SERPL-MCNC: 1.8 MG/DL (ref 0.5–1.4)
GFR SERPLBLD CREATININE-BSD FMLA CKD-EPI: 53 ML/MIN/1.73/M2
GLUCOSE SERPL-MCNC: 86 MG/DL (ref 70–110)
MAGNESIUM SERPL-MCNC: 2 MG/DL (ref 1.6–2.6)
MAGNESIUM SERPL-MCNC: 2.1 MG/DL (ref 1.6–2.6)
PHOSPHATE SERPL-MCNC: 4.4 MG/DL (ref 2.7–4.5)
POTASSIUM SERPL-SCNC: 3.7 MMOL/L (ref 3.5–5.1)
POTASSIUM SERPL-SCNC: 5.4 MMOL/L (ref 3.5–5.1)
SODIUM SERPL-SCNC: 137 MMOL/L (ref 136–145)

## 2025-05-15 PROCEDURE — 25000003 PHARM REV CODE 250: Performed by: HOSPITALIST

## 2025-05-15 PROCEDURE — 25000003 PHARM REV CODE 250

## 2025-05-15 PROCEDURE — 83735 ASSAY OF MAGNESIUM: CPT | Performed by: PHYSICIAN ASSISTANT

## 2025-05-15 PROCEDURE — 97530 THERAPEUTIC ACTIVITIES: CPT

## 2025-05-15 PROCEDURE — 90792 PSYCH DIAG EVAL W/MED SRVCS: CPT | Mod: AF,HB,, | Performed by: STUDENT IN AN ORGANIZED HEALTH CARE EDUCATION/TRAINING PROGRAM

## 2025-05-15 PROCEDURE — 36415 COLL VENOUS BLD VENIPUNCTURE: CPT | Performed by: HOSPITALIST

## 2025-05-15 PROCEDURE — 80069 RENAL FUNCTION PANEL: CPT | Performed by: HOSPITALIST

## 2025-05-15 PROCEDURE — 25000003 PHARM REV CODE 250: Performed by: INTERNAL MEDICINE

## 2025-05-15 PROCEDURE — 63600175 PHARM REV CODE 636 W HCPCS: Performed by: HOSPITALIST

## 2025-05-15 PROCEDURE — 83735 ASSAY OF MAGNESIUM: CPT | Performed by: HOSPITALIST

## 2025-05-15 PROCEDURE — 84132 ASSAY OF SERUM POTASSIUM: CPT | Performed by: PHYSICIAN ASSISTANT

## 2025-05-15 PROCEDURE — 20600001 HC STEP DOWN PRIVATE ROOM

## 2025-05-15 PROCEDURE — 36415 COLL VENOUS BLD VENIPUNCTURE: CPT | Performed by: PHYSICIAN ASSISTANT

## 2025-05-15 RX ORDER — METHOCARBAMOL 500 MG/1
500 TABLET, FILM COATED ORAL 4 TIMES DAILY PRN
Status: DISCONTINUED | OUTPATIENT
Start: 2025-05-15 | End: 2025-05-18 | Stop reason: HOSPADM

## 2025-05-15 RX ORDER — DICYCLOMINE HYDROCHLORIDE 20 MG/1
20 TABLET ORAL ONCE AS NEEDED
Status: COMPLETED | OUTPATIENT
Start: 2025-05-15 | End: 2025-05-15

## 2025-05-15 RX ORDER — LIDOCAINE HYDROCHLORIDE 20 MG/ML
15 SOLUTION OROPHARYNGEAL ONCE
Status: COMPLETED | OUTPATIENT
Start: 2025-05-15 | End: 2025-05-15

## 2025-05-15 RX ORDER — POTASSIUM CHLORIDE 20 MEQ/1
40 TABLET, EXTENDED RELEASE ORAL ONCE
Status: COMPLETED | OUTPATIENT
Start: 2025-05-15 | End: 2025-05-15

## 2025-05-15 RX ORDER — GUANFACINE 1 MG/1
1 TABLET ORAL NIGHTLY
Status: DISCONTINUED | OUTPATIENT
Start: 2025-05-15 | End: 2025-05-18 | Stop reason: HOSPADM

## 2025-05-15 RX ORDER — DAPAGLIFLOZIN 10 MG/1
10 TABLET, FILM COATED ORAL DAILY
Status: DISCONTINUED | OUTPATIENT
Start: 2025-05-15 | End: 2025-05-18 | Stop reason: HOSPADM

## 2025-05-15 RX ORDER — ALUMINUM HYDROXIDE, MAGNESIUM HYDROXIDE, AND SIMETHICONE 1200; 120; 1200 MG/30ML; MG/30ML; MG/30ML
30 SUSPENSION ORAL ONCE
Status: COMPLETED | OUTPATIENT
Start: 2025-05-15 | End: 2025-05-15

## 2025-05-15 RX ORDER — METHOCARBAMOL 500 MG/1
500 TABLET, FILM COATED ORAL EVERY 8 HOURS PRN
Status: COMPLETED | OUTPATIENT
Start: 2025-05-15 | End: 2025-05-15

## 2025-05-15 RX ADMIN — METHOCARBAMOL 500 MG: 500 TABLET ORAL at 04:05

## 2025-05-15 RX ADMIN — METHOCARBAMOL 500 MG: 500 TABLET ORAL at 01:05

## 2025-05-15 RX ADMIN — ACETAMINOPHEN 650 MG: 325 TABLET ORAL at 09:05

## 2025-05-15 RX ADMIN — DAPAGLIFLOZIN 10 MG: 10 TABLET, FILM COATED ORAL at 11:05

## 2025-05-15 RX ADMIN — ALUMINUM HYDROXIDE, MAGNESIUM HYDROXIDE, AND SIMETHICONE 30 ML: 200; 200; 20 SUSPENSION ORAL at 01:05

## 2025-05-15 RX ADMIN — POTASSIUM CHLORIDE 40 MEQ: 1500 TABLET, EXTENDED RELEASE ORAL at 11:05

## 2025-05-15 RX ADMIN — METHOCARBAMOL 500 MG: 500 TABLET ORAL at 09:05

## 2025-05-15 RX ADMIN — HYDROXYZINE PAMOATE 25 MG: 25 CAPSULE ORAL at 11:05

## 2025-05-15 RX ADMIN — METOPROLOL SUCCINATE 100 MG: 100 TABLET, EXTENDED RELEASE ORAL at 08:05

## 2025-05-15 RX ADMIN — GUANFACINE 1 MG: 1 TABLET ORAL at 08:05

## 2025-05-15 RX ADMIN — ACETAMINOPHEN 650 MG: 325 TABLET ORAL at 05:05

## 2025-05-15 RX ADMIN — METOLAZONE 5 MG: 5 TABLET ORAL at 08:05

## 2025-05-15 RX ADMIN — ALUMINUM HYDROXIDE, MAGNESIUM HYDROXIDE, AND SIMETHICONE 30 ML: 200; 200; 20 SUSPENSION ORAL at 12:05

## 2025-05-15 RX ADMIN — ENOXAPARIN SODIUM 60 MG: 60 INJECTION SUBCUTANEOUS at 08:05

## 2025-05-15 RX ADMIN — DICYCLOMINE HYDROCHLORIDE 20 MG: 20 TABLET ORAL at 08:05

## 2025-05-15 RX ADMIN — FUROSEMIDE 20 MG/HR: 10 INJECTION, SOLUTION INTRAMUSCULAR; INTRAVENOUS at 08:05

## 2025-05-15 RX ADMIN — LIDOCAINE HYDROCHLORIDE 15 ML: 20 SOLUTION ORAL; TOPICAL at 12:05

## 2025-05-15 RX ADMIN — ACETAMINOPHEN 650 MG: 325 TABLET ORAL at 06:05

## 2025-05-15 NOTE — CONSULTS
[facility] VASCULAR ACCESS NOTE       Bed:308/308 A    20G x 2.5IN PIV placed in Right Forearm by [facility] using Ultrasound Guidance.    Indication: PVA  Attempts: 1    [facility] VASCULAR ACCESS NOTE       Bed:308/308 A    20G x 2.5IN PIV placed in Right Upper Arm by [facility] using Ultrasound Guidance.    Indication: PVA  Attempts: 1    Miranda Howard RN

## 2025-05-15 NOTE — SUBJECTIVE & OBJECTIVE
Interval History: NAEON. Patient did fall yesterday, but states he tripped over IV pole/wires. Denies any dizziness, CP, or loss of consciousness. Says his shortness of breath is about the same as yesterday. Notes cramping. UOP 7.2L yesterday. Echo yesterday with EF 10-15%, venous pressure 15 mmHg. Weight 356 lbs, decreased from 370 lbs yesterday.     ROS  Objective:     Vital Signs (Most Recent):  Temp: 97.5 °F (36.4 °C) (05/15/25 0711)  Pulse: 86 (05/15/25 0834)  Resp: 18 (05/15/25 0834)  BP: 131/84 (05/15/25 0834)  SpO2: 96 % (05/15/25 0834) Vital Signs (24h Range):  Temp:  [97 °F (36.1 °C)-98.9 °F (37.2 °C)] 97.5 °F (36.4 °C)  Pulse:  [] 86  Resp:  [18-22] 18  SpO2:  [94 %-99 %] 96 %  BP: (125-146)/() 131/84     Weight: (!) 161.5 kg (356 lb 0.7 oz)  Body mass index is 51.55 kg/m².     SpO2: 96 %         Intake/Output Summary (Last 24 hours) at 5/15/2025 0915  Last data filed at 5/15/2025 0837  Gross per 24 hour   Intake 2349 ml   Output 7827 ml   Net -5478 ml       Lines/Drains/Airways       Peripheral Intravenous Line  Duration                  Peripheral IV - Single Lumen 05/14/25 1758 20 G Other (Comments) Yes Anterior;Right Forearm <1 day         Peripheral IV - Single Lumen 05/14/25 1811 20 G Other (Comments) Yes Anterior;Right Upper Arm <1 day                       Physical Exam  Vitals reviewed.   Constitutional:       Appearance: He is obese. He is ill-appearing.   HENT:      Head: Normocephalic and atraumatic.      Mouth/Throat:      Mouth: Mucous membranes are moist.      Pharynx: Oropharynx is clear.   Eyes:      General: No scleral icterus.     Extraocular Movements: Extraocular movements intact.      Conjunctiva/sclera: Conjunctivae normal.   Cardiovascular:      Rate and Rhythm: Regular rhythm. Tachycardia present.      Heart sounds: Normal heart sounds.   Pulmonary:      Effort: Pulmonary effort is normal.      Breath sounds: Normal breath sounds. No wheezing or rhonchi.    Musculoskeletal:      Right lower leg: Edema (2+ to mid shin) present.      Left lower leg: Edema (2+ to mid shin) present.   Skin:     General: Skin is warm and dry.   Neurological:      Mental Status: He is alert and oriented to person, place, and time. Mental status is at baseline.   Psychiatric:         Mood and Affect: Mood normal.         Behavior: Behavior normal.         Thought Content: Thought content normal.            Significant Labs: CMP   Recent Labs   Lab 05/13/25  1529 05/14/25  0539 05/15/25  0627    136 137   K 3.8 4.6 3.7    103 98   CO2 21* 22* 25   * 88 86   BUN 28* 28* 35*   CREATININE 1.7* 1.7* 1.8*   CALCIUM 9.1 9.1 9.0   PROT 6.5  --   --    ALBUMIN 3.2* 3.3* 3.4*   BILITOT 0.9  --   --    ALKPHOS 98  --   --    AST 27  --   --    ALT 21  --   --    ANIONGAP 10 11 14    and CBC   Recent Labs   Lab 05/13/25  1529 05/14/25  0539   WBC 7.92 8.27   HGB 11.7* 11.7*   HCT 36.4* 38.5*    270       Significant Imaging: Echocardiogram: Transthoracic echo (TTE) complete (Cupid Only):   Results for orders placed or performed during the hospital encounter of 05/13/25   Echo Ultrasound enhancing contrast? Yes   Result Value Ref Range    LV Diastolic Volume 317 mL    Echo EF Estimated 11 %    LV Systolic Volume 282 mL    IVS 0.9 0.6 - 1.1 cm    LVIDd 7.7 (A) 3.5 - 6.0 cm    LVIDs 7.3 (A) 2.1 - 4.0 cm    LVOT diameter 2.5 cm    PW 1.2 (A) 0.6 - 1.1 cm    AV LVOT peak gradient 1 mmHg    LVOT mn grad 0 mmHg    LVOT peak arturo 0.5 m/s    LVOT peak VTI 6.5 cm    RV- linares basal diam 6.0 cm    RV S' 10.42 cm/s    LA size 4.0 cm    Left Atrium Major Axis 5.6 cm    Left Atrium Minor Axis 7.9 cm    LA Vol (MOD) 185 mL    RA Major Axis 5.44 cm    RA Area 19.9 cm2    AV valve area 4.1 cm2    AV area by cont VTI 4.3 cm2    AV peak gradient 1 mmHg    AV mean gradient 1 mmHg    Ao peak arturo 0.6 m/s    Ao VTI 7.7 cm    MV Peak A Arturo 0.45 m/s    E wave deceleration time 191 ms    MV Peak E Arturo  0.67 m/s    E/A ratio 1.49     LV LATERAL E/E' RATIO 13.4     LV SEPTAL E/E' RATIO 13.4     TDI LATERAL 0.05 m/s    TDI SEPTAL 0.05 m/s    TV peak gradient 28 mmHg    TR Max Arturo 2.7 m/s    Ascending aorta 2.5 cm    STJ 2.2 cm    IVC diameter 2.39 cm    Sinus 3.24 cm    LA WIDTH 3.7 cm    RA Width 5.29 cm    TAPSE 1.4 cm    BSA 2.87 m2    LVOT stroke volume 31.9 cm3    LV Systolic Volume Index 104.4 mL/m2    LV Diastolic Volume Index 117.41 mL/m2    LVOT area 4.9 cm2    FS 5.2 (A) 28 - 44 %    Left Ventricle Relative Wall Thickness 0.31 cm    LV mass 403.8 g    LV Mass Index 149.6 g/m2    E/E' ratio 13 m/s    PHOENIX 31 mL/m2    LA Vol 82 cm3    RV/LV Ratio 0.78 cm    PHOENIX (MOD) 69 mL/m2    AV Velocity Ratio 0.83     AV index (prosthetic) 0.84     KATIE by Velocity Ratio 4.1 cm²    ASI 1.2 cm/m2    ASI 0.9 cm/m2    Mean e' 0.05 m/s    ZLVIDS -5.61     ZLVIDD -12.01     EF 10 %    TV resting pulmonary artery pressure 44 mmHg    RV TB RVSP 18 mmHg    Est. RA pres 15 mmHg    Narrative      Left Ventricle: The left ventricle is severely dilated. Moderately   increased ventricular mass. Mildly increased wall thickness. There is   moderate eccentric hypertrophy. Severe global hypokinesis present. There   is severely reduced systolic function with a visually estimated ejection   fraction of 10 -15%. Ejection fraction is approximately 10%. Grade I   diastolic dysfunction.    Right Ventricle: The right ventricle is moderately dilated Wall   thickness is normal. Right ventricle wall motion has global hypokinesis.   Systolic function is moderately reduced. Pacemaker lead present in the   ventricle.    Right Atrium: The right atrium is mildly dilated . Lead present in the   right atrium.    Tricuspid Valve: There is mild regurgitation.    Pulmonary Artery: There is mild pulmonary hypertension. The estimated   pulmonary artery systolic pressure is 44 mmHg.    IVC/SVC: Elevated venous pressure at 15 mmHg.

## 2025-05-15 NOTE — PROGRESS NOTES
[facility] - [facility] Stepdown  Cardiology  Progress Note    Patient Name: Michael Conti  MRN: [id number]  Admission Date: 5/13/2025  Hospital Length of Stay: 2 days  Code Status: Full Code   Attending Physician: Akua Ontiveros MD   Primary Care Physician: Lele Verde NP (Inactive)  Expected Discharge Date: 5/19/2025  Principal Problem:Acute on chronic combined systolic and diastolic heart failure    Subjective:     Hospital Course:   No notes on file    Interval History: NAEON. Patient did fall yesterday, but states he tripped over IV pole/wires. Denies any dizziness, CP, or loss of consciousness. Says his shortness of breath is about the same as yesterday. Notes cramping. UOP 7.2L yesterday. Echo yesterday with EF 10-15%, venous pressure 15 mmHg. Weight 356 lbs, decreased from 370 lbs yesterday.     ROS  Objective:     Vital Signs (Most Recent):  Temp: 97.5 °F (36.4 °C) (05/15/25 0711)  Pulse: 86 (05/15/25 0834)  Resp: 18 (05/15/25 0834)  BP: 131/84 (05/15/25 0834)  SpO2: 96 % (05/15/25 0834) Vital Signs (24h Range):  Temp:  [97 °F (36.1 °C)-98.9 °F (37.2 °C)] 97.5 °F (36.4 °C)  Pulse:  [] 86  Resp:  [18-22] 18  SpO2:  [94 %-99 %] 96 %  BP: (125-146)/() 131/84     Weight: (!) 161.5 kg (356 lb 0.7 oz)  Body mass index is 51.55 kg/m².     SpO2: 96 %         Intake/Output Summary (Last 24 hours) at 5/15/2025 0915  Last data filed at 5/15/2025 0837  Gross per 24 hour   Intake 2349 ml   Output 7827 ml   Net -5478 ml       Lines/Drains/Airways       Peripheral Intravenous Line  Duration                  Peripheral IV - Single Lumen 05/14/25 1758 20 G Other (Comments) Yes Anterior;Right Forearm <1 day         Peripheral IV - Single Lumen 05/14/25 1811 20 G Other (Comments) Yes Anterior;Right Upper Arm <1 day                       Physical Exam  Vitals reviewed.   Constitutional:       Appearance: He is obese. He is ill-appearing.   HENT:      Head: Normocephalic and atraumatic.      Mouth/Throat:       Mouth: Mucous membranes are moist.      Pharynx: Oropharynx is clear.   Eyes:      General: No scleral icterus.     Extraocular Movements: Extraocular movements intact.      Conjunctiva/sclera: Conjunctivae normal.   Cardiovascular:      Rate and Rhythm: Regular rhythm. Tachycardia present.      Heart sounds: Normal heart sounds.   Pulmonary:      Effort: Pulmonary effort is normal.      Breath sounds: Normal breath sounds. No wheezing or rhonchi.   Musculoskeletal:      Right lower leg: Edema (2+ to mid shin) present.      Left lower leg: Edema (2+ to mid shin) present.   Skin:     General: Skin is warm and dry.   Neurological:      Mental Status: He is alert and oriented to person, place, and time. Mental status is at baseline.   Psychiatric:         Mood and Affect: Mood normal.         Behavior: Behavior normal.         Thought Content: Thought content normal.            Significant Labs: CMP   Recent Labs   Lab 05/13/25  1529 05/14/25  0539 05/15/25  0627    136 137   K 3.8 4.6 3.7    103 98   CO2 21* 22* 25   * 88 86   BUN 28* 28* 35*   CREATININE 1.7* 1.7* 1.8*   CALCIUM 9.1 9.1 9.0   PROT 6.5  --   --    ALBUMIN 3.2* 3.3* 3.4*   BILITOT 0.9  --   --    ALKPHOS 98  --   --    AST 27  --   --    ALT 21  --   --    ANIONGAP 10 11 14    and CBC   Recent Labs   Lab 05/13/25  1529 05/14/25  0539   WBC 7.92 8.27   HGB 11.7* 11.7*   HCT 36.4* 38.5*    270       Significant Imaging: Echocardiogram: Transthoracic echo (TTE) complete ([facility] Only):   Results for orders placed or performed during the hospital encounter of 05/13/25   Echo Ultrasound enhancing contrast? Yes   Result Value Ref Range    LV Diastolic Volume 317 mL    Echo EF Estimated 11 %    LV Systolic Volume 282 mL    IVS 0.9 0.6 - 1.1 cm    LVIDd 7.7 (A) 3.5 - 6.0 cm    LVIDs 7.3 (A) 2.1 - 4.0 cm    LVOT diameter 2.5 cm    PW 1.2 (A) 0.6 - 1.1 cm    AV LVOT peak gradient 1 mmHg    LVOT mn grad 0 mmHg    LVOT peak eugenio 0.5 m/s     LVOT peak VTI 6.5 cm    RV- linares basal diam 6.0 cm    RV S' 10.42 cm/s    LA size 4.0 cm    Left Atrium Major Axis 5.6 cm    Left Atrium Minor Axis 7.9 cm    LA Vol (MOD) 185 mL    RA Major Axis 5.44 cm    RA Area 19.9 cm2    AV valve area 4.1 cm2    AV area by cont VTI 4.3 cm2    AV peak gradient 1 mmHg    AV mean gradient 1 mmHg    Ao peak arturo 0.6 m/s    Ao VTI 7.7 cm    MV Peak A Arturo 0.45 m/s    E wave deceleration time 191 ms    MV Peak E Arturo 0.67 m/s    E/A ratio 1.49     LV LATERAL E/E' RATIO 13.4     LV SEPTAL E/E' RATIO 13.4     TDI LATERAL 0.05 m/s    TDI SEPTAL 0.05 m/s    TV peak gradient 28 mmHg    TR Max Arturo 2.7 m/s    Ascending aorta 2.5 cm    STJ 2.2 cm    IVC diameter 2.39 cm    Sinus 3.24 cm    LA WIDTH 3.7 cm    RA Width 5.29 cm    TAPSE 1.4 cm    BSA 2.87 m2    LVOT stroke volume 31.9 cm3    LV Systolic Volume Index 104.4 mL/m2    LV Diastolic Volume Index 117.41 mL/m2    LVOT area 4.9 cm2    FS 5.2 (A) 28 - 44 %    Left Ventricle Relative Wall Thickness 0.31 cm    LV mass 403.8 g    LV Mass Index 149.6 g/m2    E/E' ratio 13 m/s    PHOENIX 31 mL/m2    LA Vol 82 cm3    RV/LV Ratio 0.78 cm    PHOENIX (MOD) 69 mL/m2    AV Velocity Ratio 0.83     AV index (prosthetic) 0.84     KATIE by Velocity Ratio 4.1 cm²    ASI 1.2 cm/m2    ASI 0.9 cm/m2    Mean e' 0.05 m/s    ZLVIDS -5.61     ZLVIDD -12.01     EF 10 %    TV resting pulmonary artery pressure 44 mmHg    RV TB RVSP 18 mmHg    Est. RA pres 15 mmHg    Narrative      Left Ventricle: The left ventricle is severely dilated. Moderately   increased ventricular mass. Mildly increased wall thickness. There is   moderate eccentric hypertrophy. Severe global hypokinesis present. There   is severely reduced systolic function with a visually estimated ejection   fraction of 10 -15%. Ejection fraction is approximately 10%. Grade I   diastolic dysfunction.    Right Ventricle: The right ventricle is moderately dilated Wall   thickness is normal. Right ventricle wall motion  has global hypokinesis.   Systolic function is moderately reduced. Pacemaker lead present in the   ventricle.    Right Atrium: The right atrium is mildly dilated . Lead present in the   right atrium.    Tricuspid Valve: There is mild regurgitation.    Pulmonary Artery: There is mild pulmonary hypertension. The estimated   pulmonary artery systolic pressure is 44 mmHg.    IVC/SVC: Elevated venous pressure at 15 mmHg.       Assessment and Plan:       Non-ischemic cardiomyopathy  Patient with non-ischemic cardiomyopathy with EF 10-15% in November 2024 and 30% on recent echo 4/30/25. Seen by HTS but no option for LVAD or transplant at this time due to BMI. Patient with recent hospitalization for heart failure in [place] with improvement in symptoms, but symptoms of SOB and orthopnea returned after 1 day which lead to this presentation. Patient also with ALMAS on CKD in setting of heart failure exacerbation; however, GFR 57 so can safely resume GDMT. Troponin elevated on admission but repeat troponin stable. Patient without chest pain and no EKG changes so not ACS and likely troponin elevation due to heart failure exacerbation. BNP >4,900. Echo 5/14 with EF 10-15%, severely dilated left ventricle and severe global hypokinesis.     Recommendations:   - Low sodium diet, strict I/O, fluid restriction 1.5 L   - Resume Farxiga today  - Resume spironolactone and Entresto when ALMAS improves   - Recommend starting GLP-1 for weight loss to allow for advanced heart failure options (LVAD, heart transplant)  - Continue diuresis    - Follow up outpatient with cardiologist         VTE Risk Mitigation (From admission, onward)           Ordered     enoxaparin injection 60 mg  Every 12 hours         05/13/25 2048     IP VTE HIGH RISK PATIENT  Once         05/13/25 2048     Place sequential compression device  Until discontinued         05/13/25 2048                    Justin Tao MD  Cardiology  [facility] - [facility] Step[facility]

## 2025-05-15 NOTE — NURSING
"Patient with complaint of abdominal cramping. This nurse inquired if patient thinks its a related to GI issues. Patient states that it feels like a "debbie horse". Patient also put himself on the ground and kneel on the chair. Patient on lasix gtt and with 3L urinary output. PRIMO Loo notified with orders for muscle relaxer PRN and to check CMP and Mg. Plan of care continues.   "

## 2025-05-15 NOTE — SUBJECTIVE & OBJECTIVE
Interval History: c/o cramping of abd, relief with GI cocktail.  Lasix gtt down to 20 from 30 per cards. Cresencio consulted, started on new Rx for known ADHD off of his vyvanse and other stimulants 2/2 cardiac issues.    Review of Systems   All other systems reviewed and are negative.    Objective:     Vital Signs (Most Recent):  Temp: 97.5 °F (36.4 °C) (05/15/25 0711)  Pulse: 86 (05/15/25 0834)  Resp: 18 (05/15/25 0834)  BP: 131/84 (05/15/25 0834)  SpO2: 96 % (05/15/25 0834) Vital Signs (24h Range):  Temp:  [97 °F (36.1 °C)-98.9 °F (37.2 °C)] 97.5 °F (36.4 °C)  Pulse:  [] 86  Resp:  [18-22] 18  SpO2:  [94 %-99 %] 96 %  BP: (125-146)/() 131/84     Weight: (!) 161.5 kg (356 lb 0.7 oz)  Body mass index is 51.55 kg/m².    Intake/Output Summary (Last 24 hours) at 5/15/2025 0958  Last data filed at 5/15/2025 0923  Gross per 24 hour   Intake 2349 ml   Output 7903 ml   Net -5554 ml         Physical Exam  Vitals reviewed.               Significant Labs: All pertinent labs within the past 24 hours have been reviewed.    Significant Imaging: I have reviewed all pertinent imaging results/findings within the past 24 hours.

## 2025-05-15 NOTE — NURSING
"Patient received compazine for nausea, patient stated " I feel weird since you gave me that medication." MD Billy notified.  Patient c/o feeling scared. Pysch consult ordered. Safety maintained. MD Billy discontinued compazine.  Will continue to monitor.  "

## 2025-05-15 NOTE — PLAN OF CARE
Problem: Adult Inpatient Plan of Care  Goal: Plan of Care Review  Outcome: Progressing  Goal: Patient-Specific Goal (Individualized)  Outcome: Progressing  Goal: Absence of Hospital-Acquired Illness or Injury  Outcome: Progressing  Goal: Optimal Comfort and Wellbeing  Outcome: Progressing  Goal: Readiness for Transition of Care  Outcome: Progressing     Problem: Bariatric Environmental Safety  Goal: Safety Maintained with Care  Outcome: Progressing     Problem: Infection  Goal: Absence of Infection Signs and Symptoms  Outcome: Progressing     Problem: Wound  Goal: Optimal Coping  Outcome: Progressing  Goal: Optimal Functional Ability  Outcome: Progressing  Goal: Absence of Infection Signs and Symptoms  Outcome: Progressing  Goal: Improved Oral Intake  Outcome: Progressing  Goal: Optimal Pain Control and Function  Outcome: Progressing  Goal: Skin Health and Integrity  Outcome: Progressing  Goal: Optimal Wound Healing  Outcome: Progressing     Problem: Fall Injury Risk  Goal: Absence of Fall and Fall-Related Injury  Outcome: Progressing     Patient alert and oriented x4. Patient free from fall and injury. Fall precautions remained in place. VSS. Patient remained on room air. Sats 95-99. NSR-ST on telemetry. Plan of care reviewed with the patient. Lasix gtt continued per orders. Intake and output monitored. Daily weight obtained. Patient denies pain, chest pain, headaches, or SOB. No acute distress noted. Plan of care continues.

## 2025-05-15 NOTE — PT/OT/SLP PROGRESS
"Occupational Therapy   Treatment    Name: Michael Conti  MRN: [id number]  Admitting Diagnosis:  Acute on chronic combined systolic and diastolic heart failure       Recommendations:     Discharge Recommendations: Low Intensity Therapy  Discharge Equipment Recommendations:  shower chair  Barriers to discharge:  None    Assessment:     Michael Conti is a 24 y.o. male with a medical diagnosis of Acute on chronic combined systolic and diastolic heart failure.  He presents with good participation for presented therapeutic interventions. Pt with severely compared activity tolerance and weakness compared to PLOF. Pt required multiple energy conservation breaks when performing functional mobility trial this date. Pt is currently not at his PLOF and would greatly benefit from continued skilled OT intervention in efforts to participate in meaningful occupations of choice at the highest level of independence.  Performance deficits affecting function are impaired cardiopulmonary response to activity, decreased safety awareness, impaired endurance, weakness, pain.     Rehab Prognosis:  Good; patient would benefit from acute skilled OT services to address these deficits and reach maximum level of function.       Plan:     Patient to be seen 3 x/week to address the above listed problems via therapeutic activities, therapeutic exercises, community/work re-entry  Plan of Care Expires: 05/21/25  Plan of Care Reviewed with: patient    Subjective     Chief Complaint: "I'm sorry I'm just so weak"  Patient/Family Comments/goals: "thank you for coming I hope you have a good day"  Pain/Comfort:  Pain Rating 1: other (see comments) (not rated)  Location 1: chest  Pain Addressed 1: Reposition, Distraction, Cessation of Activity    Objective:     Communicated with: RN prior to session.  Patient found prone with telemetry, peripheral IV upon OT entry to room.    General Precautions: Standard, fall    Orthopedic Precautions:N/A  Braces: " N/A  Respiratory Status: Room air     Occupational Performance:     Bed Mobility:    Patient completed Supine to Sit with independence  Patient completed Sit to Supine with independence     Functional Mobility/Transfers:  Patient completed Sit <> Stand Transfer with supervision  with  no assistive device   Functional Mobility: Pt engaging in functional mobility to simulate household/community distances approx >300 ft with supv and utilizing no AD in order to maximize functional activity tolerance and standing balance required for engagement in occupations of choice.     Activities of Daily Living:  Pt politely declined ADL needs today       [facility] 6 Click ADL: 24    Treatment & Education:  Pt educated on the following:  - role of OT and OT POC, including DC from OT. Pt verbalized understanding.  - importance of continued mobilization  - Safe transfer techniques and proper body mechanics for fall prevention and improved independence with functional transfers   - Importance of OOB activities to increase endurance and tolerance for increased participation in daily ADLs.    - All pt questions within OT scope of practice addressed, pt verbalized understanding.     Pt provided with energy conservation training (utilizing toilet seat for rest breaks, and grab bars when completing t/f tasks) when performing ADL tasks in efforts to increase activity tolerance and independence in meaningful occupations.     Patient left prone with all lines intact, call button in reach, and sitter present    GOALS:   Multidisciplinary Problems       Occupational Therapy Goals          Problem: Occupational Therapy    Goal Priority Disciplines Outcome Interventions   Occupational Therapy Goal     OT, PT/OT Progressing    Description: Goals to be met by: 05-21-25     Patient will increase functional independence with ADLs by performing:    UE Dressing with [place].  Grooming while standing with [place].  Toileting from toilet with  [place] for hygiene and clothing management.   Step transfer with [place]  Toilet transfer to toilet with [place].  Pt. To state 3 energy conservation techniques to implement in home environment with ADL/IADL task performance                         DME Justifications:  No DME recommended requiring DME justifications    Time Tracking:     OT Date of Treatment: 05/15/25  OT Start Time: 1339  OT Stop Time: 1354  OT Total Time (min): 15 min    Billable Minutes:Therapeutic Activity 15    OT/MILVIA: OT          5/15/2025

## 2025-05-15 NOTE — NURSING
"Patient with same complaint of abdominal cramping. Patient pacing around room and also voluntarily dropping on his knees to the ground in front staff. Stating, "please help me". This nurse attempted to console patient without relief. Tylenol prn given for pain. Labs drawn this AM. Significant output noted overnight. Patient dropped 14 lbs of weight. PA notified and ordered dicyclomine prn for abd cramping. Plan of care continues.   "

## 2025-05-15 NOTE — PROGRESS NOTES
[facility] ([facility]) Team notified of pt referral via Ambulatory Referral to Heart Failure Transitional Care ([id number]).    Patient screened today by provider and HF nurse for enrollment to program.       Attempted to reach pt to scheduled an appointment with [facility]. No answer. Voice message left to call the clinic back.      Pt will require additional follow up planning per primary team.

## 2025-05-15 NOTE — ASSESSMENT & PLAN NOTE
Patient with non-ischemic cardiomyopathy with EF 10-15% in November 2024 and 30% on recent echo 4/30/25. Seen by HTS but no option for LVAD or transplant at this time due to BMI. Patient with recent hospitalization for heart failure in [place] with improvement in symptoms, but symptoms of SOB and orthopnea returned after 1 day which lead to this presentation. Patient also with ALMAS on CKD in setting of heart failure exacerbation; however, GFR 57 so can safely resume GDMT. Troponin elevated on admission but repeat troponin stable. Patient without chest pain and no EKG changes so not ACS and likely troponin elevation due to heart failure exacerbation. BNP >4,900. Echo 5/14 with EF 10-15%, severely dilated left ventricle and severe global hypokinesis.     Recommendations:   - Low sodium diet, strict I/O, fluid restriction 1.5 L   - Resume Farxiga today  - Resume spironolactone and Entresto when ALMAS improves   - Recommend starting GLP-1 for weight loss to allow for advanced heart failure options (LVAD, heart transplant)  - Continue diuresis    - Follow up outpatient with cardiologist

## 2025-05-15 NOTE — CONSULTS
"CONSULTATION LIAISON PSYCHIATRY INITIAL EVALUATION    Patient Name: Michael Conti  MRN: [id number]  Patient Class: IP- Inpatient  Admission Date: 5/13/2025  Attending Physician: Akua Ontiveros MD      SUBJECTIVE:   Michael Conti is a 24 y.o. male with past psychiatric history of ADHD & past pertinent medical history of HFrEF (EF 10-15%) with AICD and CKD presents to the ED/admitted to the hospital for HF exacerbation.    Psychiatry consulted for "assistance in management of cooperativeness. Alterantives to restraints? Falling due to impulsivenss and poor judgement. Compazine (for nausea) gave him paranoia and anxiety. Do you have suggestsion for other prns?"    Upon initiation of interview, pt was sitting in chair at bedside. He is calm, cooperative, and polite during the assessment. Awake and oriented x4. Reports mood is "good." Denies SI/HI/AVH.     He reports that he previously saw a psychiatrist when he was a teenager due to "having an attitude." When asked about diagnoses and treatment he says he was diagnosed with ADHD and treated with stimulants, but these had to be discontinued about 5 years ago due to cardiac contraindications. He admits to being impulsive at times, though reports that his fall in the hospital was due to mechanical trip and fall on the wires near him. Per sitter, she almost tripped on these wires as well. Pt reports that he is having trouble sitting still due to cramps in his legs and stomach, leading him to move around to try and get comfortable. He is agreeable to following the directions of his hospital treatment team.       *Per [place] PDMP review, pt prescribed Adderall 20mg #30 in 2019 and 10mg #30 in 2020      Psychiatric Review of Systems:  sleep: no  appetite: no  weight: yes  energy/anergy: no  interest/pleasure/anhedonia: no  somatic symptoms: yes  anxiety/panic: yes  guilty/hopelessness: no  concentration: yes  S.I.B.s/risky behavior: no    Medical Review Of " Systems:  Behavioral/Psych: no auditory or visual hallucinations, no suicidal ideation, no homocidal ideation,   negative for aggressive behavior, bad mood, depression, irritability, and sleep disturbance    Psychiatric History:  Previous Medication Trials: Yes - reports Vyvanse and Adderall (PDMP review shows Adderall)  Previous Psychiatric Hospitalizations: Yes - [facility] in teens  Previous suicide attempts: Yes - via pill overdose  Current/active homicidal ideation/plan/intent: No  History of threats/arrests associated with violent conduct - No  Access to firearms/lethal weapons - No  Family Psychiatric History: [facility]  Outpatient Psychiatrist: No  Outpatient Therapist: No    Social History:  Marital Status: not   Children: 0   Employment Status: unemployed  Education: high school diploma/GED  Special Ed: [facility]  Housing Status: Yes - with grandmother in [place], [place]  History of Abuse: [facility]    Substance Abuse History:  Recreational Drugs: denies  Use of Alcohol: denied  Rehab History: No  Tobacco Use: No  Legal consequences of chemical use: No    Legal History:  Past Charges/Incarcerations: denies  Pending Charges: denies    Psychosocial Factors:  Stressors: health  Functioning Relationships: good relationship with grandmother       OBJECTIVE     Vital Signs:  Temp:  [97 °F (36.1 °C)-98.9 °F (37.2 °C)]   Pulse:  []   Resp:  [18-22]   BP: (125-146)/()   SpO2:  [94 %-99 %]     Mental Status Exam:  General Appearance: appears stated age, well developed and nourished, adequately groomed and appropriately dressed, in no acute distress  Behavior: normal; cooperative; reasonably friendly, pleasant, and polite; appropriate eye-contact; under good behavioral control  Involuntary Movements and Motor Activity: no abnormal involuntary movements noted; no tics, no tremors, no akathisia, no dystonia, no evidence of tardive dyskinesia; no psychomotor agitation or retardation  Gait and Station: unable to assess -  "patient lying down or seated  Speech and Language: normal rate, rhythm, volume, tone, and pitch  Mood: "good"  Affect: reactive, appropriate to situation and context  Thought Process and Associations: intact; linear, goal-directed, organized, and logical; no loosening of associations noted  Perceptual Disturbances: denies hallucinations  Thought Content and Perceptions:: no suicidal or homicidal ideation, no auditory or visual hallucinations, no paranoid ideation, no ideas of reference, no evidence of delusions or psychosis  Sensorium and Orientation: intact; alert with clear sensorium; oriented fully to person, place, time and situation  Recent and Remote Memory: grossly intact, able to recall relevant and salient information from the recent and remote past  Attention and Concentration: grossly intact, attentive to the conversation and not readily distractible  Fund of Knowledge: grossly intact, used appropriate vocabulary and demonstrated an awareness of current events, consistent with educational level achieved  Insight: intact, demonstrates awareness of illness and situation  Judgment: intact, behavior is adequate/appropriate to the circumstances, compliant with health provider's recommendations and instructions    CAM ICU positive? no      ASSESSMENT & RECOMMENDATIONS   ADHD, combined type       Scheduled Medication(s):  Start Guanfacine 1mg nightly for ADHD. ADHD can manifest in many ways and may be contributing to the impulsiveness seen during hospital stay. Needs close monitoring of vital signs and cardiac parameters.       PRN Medication(s):  None      Other Recommendations (labs, imaging, further consults, etc.):  None      Delirium Behavior Management  PLEASE utilize PRN meds first for agitation. Minimize use of PHYSICAL restraints OR have periods of being out of physical restraints if possible.  Keep window shades open and room lit during day and room dim at night in order to promote normal sleep-wake " cycles  Encourage family at bedside. [place] patient often to situation, location, date.  Continue to Limit or Discontinue use of Narcotics, Benzos and Anti-cholinergic medications as they may worsen delirium.  Continue medical workup for causative etiology of Delirium.     Risk Assessment / Legal Status  Patient does not meet criteria for PEC or involuntary inpatient psychiatric admission at this time. Recommend to rescind PEC if one was placed. Patient is not currently an imminent danger to self or others and is not gravely disabled due to a psychiatric illness.    Follow-up:  Will follow-up while in house.    Disposition:   Defer to primary team.    Please contact ON CALL psychiatry service (24/7) for any acute issues that may arise.    Dr. Eric Gudino   Psychiatry  [facility]  5/15/2025 10:13 AM        --------------------------------------------------------------------------------------------------------------------------------------------------------------------------------------------------------------------------------------    CONTINUED OBJECTIVE clinical data & findings reviewed and noted for above decision making    Current Medications:   Scheduled Meds:    enoxparin  60 mg Subcutaneous Q12H    metOLazone  5 mg Oral Daily    metoprolol succinate  100 mg Oral BID    potassium chloride  40 mEq Oral Once     PRN Meds:   Current Facility-Administered Medications:     acetaminophen, 650 mg, Oral, Q4H PRN    aluminum-magnesium hydroxide-simethicone, 30 mL, Oral, QID PRN    dextromethorphan-guaiFENesin  mg/5 ml, 10 mL, Oral, Q4H PRN    dicyclomine, 20 mg, Oral, Once PRN    hydrOXYzine pamoate, 25 mg, Oral, Q6H PRN    melatonin, 6 mg, Oral, Nightly PRN    metoclopramide, 10 mg, Intravenous, Q6H PRN    naloxone, 0.02 mg, Intravenous, PRN    nitroGLYCERIN, 0.4 mg, Sublingual, Q5 Min PRN    ondansetron, 4 mg, Intravenous, Q8H PRN    polyethylene glycol, 17 g, Oral, Daily PRN     senna-docusate, 1 tablet, Oral, BID PRN    sodium chloride 0.9%, 10 mL, Intravenous, PRN    traZODone, 50 mg, Oral, Nightly PRN    Allergies:   Review of patient's allergies indicates:   Allergen Reactions    Compazine [prochlorperazine]      anxiety       Vitals  Vitals:    05/15/25 0834   BP: 131/84   Pulse: 86   Resp: 18   Temp:        Labs/Imaging/Studies:  Recent Results (from the past 24 hours)   EKG 12-lead    Collection Time: 05/14/25  1:54 PM   Result Value Ref Range    QRS Duration 90 ms    [facility] QTC Calculation 490 ms   Echo Ultrasound enhancing contrast? Yes    Collection Time: 05/14/25  3:48 PM   Result Value Ref Range    LV Diastolic Volume 317 mL    Echo EF Estimated 11 %    LV Systolic Volume 282 mL    IVS 0.9 0.6 - 1.1 cm    LVIDd 7.7 (A) 3.5 - 6.0 cm    LVIDs 7.3 (A) 2.1 - 4.0 cm    LVOT diameter 2.5 cm    PW 1.2 (A) 0.6 - 1.1 cm    AV LVOT peak gradient 1 mmHg    LVOT mn grad 0 mmHg    LVOT peak arturo 0.5 m/s    LVOT peak VTI 6.5 cm    RV- linares basal diam 6.0 cm    RV S' 10.42 cm/s    LA size 4.0 cm    Left Atrium Major Axis 5.6 cm    Left Atrium Minor Axis 7.9 cm    LA Vol (MOD) 185 mL    RA Major Axis 5.44 cm    RA Area 19.9 cm2    AV valve area 4.1 cm2    AV area by cont VTI 4.3 cm2    AV peak gradient 1 mmHg    AV mean gradient 1 mmHg    Ao peak arturo 0.6 m/s    Ao VTI 7.7 cm    MV Peak A Arturo 0.45 m/s    E wave deceleration time 191 ms    MV Peak E Arturo 0.67 m/s    E/A ratio 1.49     LV LATERAL E/E' RATIO 13.4     LV SEPTAL E/E' RATIO 13.4     TDI LATERAL 0.05 m/s    TDI SEPTAL 0.05 m/s    TV peak gradient 28 mmHg    TR Max Arturo 2.7 m/s    Ascending aorta 2.5 cm    STJ 2.2 cm    IVC diameter 2.39 cm    Sinus 3.24 cm    LA WIDTH 3.7 cm    RA Width 5.29 cm    TAPSE 1.4 cm    BSA 2.87 m2    LVOT stroke volume 31.9 cm3    LV Systolic Volume Index 104.4 mL/m2    LV Diastolic Volume Index 117.41 mL/m2    LVOT area 4.9 cm2    FS 5.2 (A) 28 - 44 %    Left Ventricle Relative Wall Thickness 0.31 cm    LV mass  403.8 g    LV Mass Index 149.6 g/m2    E/E' ratio 13 m/s    PHOENIX 31 mL/m2    LA Vol 82 cm3    RV/LV Ratio 0.78 cm    PHOENIX (MOD) 69 mL/m2    AV Velocity Ratio 0.83     AV index (prosthetic) 0.84     KATIE by Velocity Ratio 4.1 cm²    ASI 1.2 cm/m2    ASI 0.9 cm/m2    Mean e' 0.05 m/s    ZLVIDS -5.61     ZLVIDD -12.01     EF 10 %    TV resting pulmonary artery pressure 44 mmHg    RV TB RVSP 18 mmHg    Est. RA pres 15 mmHg   Renal Function Panel    Collection Time: 05/15/25  6:27 AM   Result Value Ref Range    Sodium 137 136 - 145 mmol/L    Potassium 3.7 3.5 - 5.1 mmol/L    Chloride 98 95 - 110 mmol/L    CO2 25 23 - 29 mmol/L    Glucose 86 70 - 110 mg/dL    BUN 35 (H) 6 - 20 mg/dL    Creatinine 1.8 (H) 0.5 - 1.4 mg/dL    Calcium 9.0 8.7 - 10.5 mg/dL    Albumin 3.4 (L) 3.5 - 5.2 g/dL    Phosphorus Level 4.4 2.7 - 4.5 mg/dL    Anion Gap 14 8 - 16 mmol/L    eGFR 53 (L) >60 mL/min/1.73/m2   Magnesium    Collection Time: 05/15/25  6:27 AM   Result Value Ref Range    Magnesium  2.0 1.6 - 2.6 mg/dL     Imaging Results               CT Head Without Contrast (Final result)  Result time 05/13/25 20:59:05      Final result by Justin Lew MD (05/13/25 20:59:05)                   Impression:      1. Suspected right parietal scalp small soft tissue contusion without displaced skull fracture or acute intracranial abnormality identified.  2. No CT evidence of cervical spine acute osseous traumatic injury.  3. Enlarged heterogeneous thyroid suggesting underlying nodules.  Further evaluation with elective/nonemergent thyroid ultrasound can be obtained as warranted.  This report was flagged in Epic as containing an incidental finding.      Electronically signed by: Justin Lew MD  Date:    05/13/2025  Time:    20:59               Narrative:    EXAMINATION:  CT HEAD WITHOUT CONTRAST; CT CERVICAL SPINE WITHOUT CONTRAST    CLINICAL HISTORY:  Fall and on Eliquis;; Fall with head trauma;    TECHNIQUE:  Low dose axial CT images obtained  throughout the head and cervical spine without intravenous contrast. Sagittal and coronal reconstructions were performed.    COMPARISON:  Chest radiograph 05/07/2025    FINDINGS:  Head CT:    Intracranial compartment: Brain appears normally formed. Ventricles and sulci are normal in size for age without evidence of hydrocephalus. No extra-axial blood or fluid collections.    The brain parenchyma appears normal. No parenchymal mass, hemorrhage, edema or major vascular distribution infarct.    Skull/extracranial contents (limited evaluation): Small focus of subcutaneous fat stranding overlying the right parietal calvarium suggesting small scalp contusion.  No fracture. Mastoid air cells and paranasal sinuses are essentially clear.  Imaged portions of the orbits are within normal limits.    Cervical spine CT: Straightening of the cervical lordosis.  Well corticated suspected anatomic variant at the left transverse process of C1.  Vertebral body and intervertebral disc space heights appear relatively maintained.  Bones are well mineralized.  Dens and lateral masses are well aligned and intact.  No displaced fracture, dislocation or significant listhesis.  No destructive osseous process.  No significant degenerative change, spinal canal stenosis or neural foraminal narrowing seen at any level.  No prevertebral soft tissue thickening.  No paraspinal mass or fluid collection.  No subcutaneous emphysema or radiopaque foreign body.    Partially imaged catheter seen within the left subclavian vein.  Left thyroid is asymmetrically larger and heterogeneous suggesting underlying nodules.  There is associated mass effect with mild rightward shift and narrowing of the trachea which remains patent.  Imaged lung apices are clear.                                        CT Cervical Spine Without Contrast (Final result)  Result time 05/13/25 20:59:05      Final result by Justin Lew MD (05/13/25 20:59:05)                    Impression:      1. Suspected right parietal scalp small soft tissue contusion without displaced skull fracture or acute intracranial abnormality identified.  2. No CT evidence of cervical spine acute osseous traumatic injury.  3. Enlarged heterogeneous thyroid suggesting underlying nodules.  Further evaluation with elective/nonemergent thyroid ultrasound can be obtained as warranted.  This report was flagged in Epic as containing an incidental finding.      Electronically signed by: Justin Lew MD  Date:    05/13/2025  Time:    20:59               Narrative:    EXAMINATION:  CT HEAD WITHOUT CONTRAST; CT CERVICAL SPINE WITHOUT CONTRAST    CLINICAL HISTORY:  Fall and on Eliquis;; Fall with head trauma;    TECHNIQUE:  Low dose axial CT images obtained throughout the head and cervical spine without intravenous contrast. Sagittal and coronal reconstructions were performed.    COMPARISON:  Chest radiograph 05/07/2025    FINDINGS:  Head CT:    Intracranial compartment: Brain appears normally formed. Ventricles and sulci are normal in size for age without evidence of hydrocephalus. No extra-axial blood or fluid collections.    The brain parenchyma appears normal. No parenchymal mass, hemorrhage, edema or major vascular distribution infarct.    Skull/extracranial contents (limited evaluation): Small focus of subcutaneous fat stranding overlying the right parietal calvarium suggesting small scalp contusion.  No fracture. Mastoid air cells and paranasal sinuses are essentially clear.  Imaged portions of the orbits are within normal limits.    Cervical spine CT: Straightening of the cervical lordosis.  Well corticated suspected anatomic variant at the left transverse process of C1.  Vertebral body and intervertebral disc space heights appear relatively maintained.  Bones are well mineralized.  Dens and lateral masses are well aligned and intact.  No displaced fracture, dislocation or significant listhesis.  No destructive  osseous process.  No significant degenerative change, spinal canal stenosis or neural foraminal narrowing seen at any level.  No prevertebral soft tissue thickening.  No paraspinal mass or fluid collection.  No subcutaneous emphysema or radiopaque foreign body.    Partially imaged catheter seen within the left subclavian vein.  Left thyroid is asymmetrically larger and heterogeneous suggesting underlying nodules.  There is associated mass effect with mild rightward shift and narrowing of the trachea which remains patent.  Imaged lung apices are clear.                                       X-Ray Chest AP Portable (Final result)  Result time 05/13/25 18:24:46      Final result by Marco A Benson MD (05/13/25 18:24:46)                   Impression:      Cardiomegaly without additional evidence of acute cardiopulmonary process.      Electronically signed by: Marco A Benson  Date:    05/13/2025  Time:    18:24               Narrative:    EXAMINATION:  XR CHEST AP PORTABLE    CLINICAL HISTORY:  Chest Pain;    TECHNIQUE:  Single frontal view of the chest was performed.    COMPARISON:  Radiographs of the chest from 05/07/2025    FINDINGS:  Heart size is enlarged.  Pulmonary vasculature within normal limits.  A left subclavian single lead cardiac conduction device is present with lead terminations overlying the right ventricle.  No evidence of focal consolidation, pneumothorax, or pleural effusion.  No evidence of acute osseous process.

## 2025-05-16 LAB
ALBUMIN SERPL BCP-MCNC: 3.6 G/DL (ref 3.5–5.2)
ALBUMIN SERPL BCP-MCNC: 3.7 G/DL (ref 3.5–5.2)
ALBUMIN SERPL BCP-MCNC: 3.7 G/DL (ref 3.5–5.2)
ALP SERPL-CCNC: 129 UNIT/L (ref 40–150)
ALP SERPL-CCNC: 138 UNIT/L (ref 40–150)
ALT SERPL W/O P-5'-P-CCNC: 26 UNIT/L (ref 10–44)
ALT SERPL W/O P-5'-P-CCNC: 26 UNIT/L (ref 10–44)
ANION GAP (OHS): 14 MMOL/L (ref 8–16)
ANION GAP (OHS): 16 MMOL/L (ref 8–16)
ANION GAP (OHS): 17 MMOL/L (ref 8–16)
AST SERPL-CCNC: 29 UNIT/L (ref 11–45)
AST SERPL-CCNC: 32 UNIT/L (ref 11–45)
BILIRUB SERPL-MCNC: 0.6 MG/DL (ref 0.1–1)
BILIRUB SERPL-MCNC: 0.7 MG/DL (ref 0.1–1)
BSA FOR ECHO PROCEDURE: 2.87 M2
BUN SERPL-MCNC: 47 MG/DL (ref 6–20)
BUN SERPL-MCNC: 47 MG/DL (ref 6–20)
BUN SERPL-MCNC: 52 MG/DL (ref 6–20)
CALCIUM SERPL-MCNC: 10.5 MG/DL (ref 8.7–10.5)
CALCIUM SERPL-MCNC: 9.8 MG/DL (ref 8.7–10.5)
CALCIUM SERPL-MCNC: 9.9 MG/DL (ref 8.7–10.5)
CHLORIDE SERPL-SCNC: 91 MMOL/L (ref 95–110)
CO2 SERPL-SCNC: 28 MMOL/L (ref 23–29)
CO2 SERPL-SCNC: 30 MMOL/L (ref 23–29)
CO2 SERPL-SCNC: 32 MMOL/L (ref 23–29)
CREAT SERPL-MCNC: 2.1 MG/DL (ref 0.5–1.4)
CREAT SERPL-MCNC: 2.1 MG/DL (ref 0.5–1.4)
CREAT SERPL-MCNC: 2.2 MG/DL (ref 0.5–1.4)
GFR SERPLBLD CREATININE-BSD FMLA CKD-EPI: 42 ML/MIN/1.73/M2
GFR SERPLBLD CREATININE-BSD FMLA CKD-EPI: 44 ML/MIN/1.73/M2
GFR SERPLBLD CREATININE-BSD FMLA CKD-EPI: 44 ML/MIN/1.73/M2
GLUCOSE SERPL-MCNC: 135 MG/DL (ref 70–110)
GLUCOSE SERPL-MCNC: 84 MG/DL (ref 70–110)
GLUCOSE SERPL-MCNC: 96 MG/DL (ref 70–110)
HOLD SPECIMEN: NORMAL
IVC DIAMETER: 2.4 CM
MAGNESIUM SERPL-MCNC: 2.2 MG/DL (ref 1.6–2.6)
MAGNESIUM SERPL-MCNC: 2.3 MG/DL (ref 1.6–2.6)
MAGNESIUM SERPL-MCNC: 2.4 MG/DL (ref 1.6–2.6)
PHOSPHATE SERPL-MCNC: 4.6 MG/DL (ref 2.7–4.5)
PHOSPHATE SERPL-MCNC: 5.6 MG/DL (ref 2.7–4.5)
PISA TR MAX VEL: 3.1 M/S
POTASSIUM SERPL-SCNC: 3.1 MMOL/L (ref 3.5–5.1)
POTASSIUM SERPL-SCNC: 3.5 MMOL/L (ref 3.5–5.1)
POTASSIUM SERPL-SCNC: 4 MMOL/L (ref 3.5–5.1)
POTASSIUM SERPL-SCNC: 4.4 MMOL/L (ref 3.5–5.1)
PROT SERPL-MCNC: 8 GM/DL (ref 6–8.4)
PROT SERPL-MCNC: 8.3 GM/DL (ref 6–8.4)
RA PRESSURE ESTIMATED: 3 MMHG
RV TB RVSP: 6 MMHG
SODIUM SERPL-SCNC: 136 MMOL/L (ref 136–145)
SODIUM SERPL-SCNC: 137 MMOL/L (ref 136–145)
SODIUM SERPL-SCNC: 137 MMOL/L (ref 136–145)
TV PEAK GRADIENT: 37 MMHG
TV REST PULMONARY ARTERY PRESSURE: 41 MMHG

## 2025-05-16 PROCEDURE — 25000003 PHARM REV CODE 250

## 2025-05-16 PROCEDURE — 36415 COLL VENOUS BLD VENIPUNCTURE: CPT | Performed by: PHYSICIAN ASSISTANT

## 2025-05-16 PROCEDURE — 63600175 PHARM REV CODE 636 W HCPCS: Performed by: HOSPITALIST

## 2025-05-16 PROCEDURE — 99232 SBSQ HOSP IP/OBS MODERATE 35: CPT | Mod: AF,HB,, | Performed by: STUDENT IN AN ORGANIZED HEALTH CARE EDUCATION/TRAINING PROGRAM

## 2025-05-16 PROCEDURE — 36415 COLL VENOUS BLD VENIPUNCTURE: CPT | Performed by: HOSPITALIST

## 2025-05-16 PROCEDURE — 25000003 PHARM REV CODE 250: Performed by: HOSPITALIST

## 2025-05-16 PROCEDURE — 83735 ASSAY OF MAGNESIUM: CPT | Performed by: HOSPITALIST

## 2025-05-16 PROCEDURE — 84132 ASSAY OF SERUM POTASSIUM: CPT | Performed by: PHYSICIAN ASSISTANT

## 2025-05-16 PROCEDURE — 82040 ASSAY OF SERUM ALBUMIN: CPT | Performed by: HOSPITALIST

## 2025-05-16 PROCEDURE — 94761 N-INVAS EAR/PLS OXIMETRY MLT: CPT

## 2025-05-16 PROCEDURE — 84100 ASSAY OF PHOSPHORUS: CPT | Performed by: HOSPITALIST

## 2025-05-16 PROCEDURE — 20600001 HC STEP DOWN PRIVATE ROOM

## 2025-05-16 RX ORDER — FUROSEMIDE 10 MG/ML
80 INJECTION INTRAMUSCULAR; INTRAVENOUS EVERY 12 HOURS
Status: DISCONTINUED | OUTPATIENT
Start: 2025-05-16 | End: 2025-05-17

## 2025-05-16 RX ORDER — POTASSIUM CHLORIDE 20 MEQ/1
40 TABLET, EXTENDED RELEASE ORAL
Status: DISPENSED | OUTPATIENT
Start: 2025-05-16 | End: 2025-05-16

## 2025-05-16 RX ORDER — POTASSIUM CHLORIDE 20 MEQ/1
20 TABLET, EXTENDED RELEASE ORAL ONCE
Status: COMPLETED | OUTPATIENT
Start: 2025-05-16 | End: 2025-05-16

## 2025-05-16 RX ORDER — POTASSIUM CHLORIDE 20 MEQ/1
40 TABLET, EXTENDED RELEASE ORAL ONCE
Status: COMPLETED | OUTPATIENT
Start: 2025-05-16 | End: 2025-05-16

## 2025-05-16 RX ADMIN — ACETAMINOPHEN 650 MG: 325 TABLET ORAL at 08:05

## 2025-05-16 RX ADMIN — METHOCARBAMOL 500 MG: 500 TABLET ORAL at 12:05

## 2025-05-16 RX ADMIN — DAPAGLIFLOZIN 10 MG: 10 TABLET, FILM COATED ORAL at 08:05

## 2025-05-16 RX ADMIN — METOPROLOL SUCCINATE 100 MG: 100 TABLET, EXTENDED RELEASE ORAL at 08:05

## 2025-05-16 RX ADMIN — ACETAMINOPHEN 650 MG: 325 TABLET ORAL at 03:05

## 2025-05-16 RX ADMIN — ENOXAPARIN SODIUM 60 MG: 60 INJECTION SUBCUTANEOUS at 08:05

## 2025-05-16 RX ADMIN — POTASSIUM CHLORIDE 40 MEQ: 1500 TABLET, EXTENDED RELEASE ORAL at 04:05

## 2025-05-16 RX ADMIN — METHOCARBAMOL 500 MG: 500 TABLET ORAL at 03:05

## 2025-05-16 RX ADMIN — ACETAMINOPHEN 650 MG: 325 TABLET ORAL at 12:05

## 2025-05-16 RX ADMIN — FUROSEMIDE 80 MG: 10 INJECTION, SOLUTION INTRAVENOUS at 08:05

## 2025-05-16 RX ADMIN — POTASSIUM CHLORIDE 20 MEQ: 1500 TABLET, EXTENDED RELEASE ORAL at 08:05

## 2025-05-16 RX ADMIN — POTASSIUM CHLORIDE 40 MEQ: 1500 TABLET, EXTENDED RELEASE ORAL at 01:05

## 2025-05-16 RX ADMIN — GUANFACINE 1 MG: 1 TABLET ORAL at 09:05

## 2025-05-16 RX ADMIN — METHOCARBAMOL 500 MG: 500 TABLET ORAL at 08:05

## 2025-05-16 RX ADMIN — Medication 6 MG: at 08:05

## 2025-05-16 NOTE — PROGRESS NOTES
"CONSULTATION LIAISON PSYCHIATRY PROGRESS NOTE    Patient Name: Michael Conti  MRN: [id number]  Patient Class: IP- Inpatient  Admission Date: 2025  Attending Physician: Akua Ontiveros MD      SUBJECTIVE:   Michael Conti is a 24 y.o. male with past psychiatric history of ADHD & past pertinent medical history of HFrEF (EF 10-15%) with AICD and CKD presents to the ED/admitted to the hospital for HF exacerbation.     Psychiatry consulted for "assistance in management of cooperativeness. Alterantives to restraints? Falling due to impulsivenss and poor judgement. Compazine (for nausea) gave him paranoia and anxiety. Do you have suggestsion for other prns?"     Interval History:   NAEON  Adherent with scheduled medications. Started on Guanfacine 1mg last night.     Upon psychiatric interview today, pt sitting in bedside chair playing Playstation. He is awake and oriented x4. Reports his mood is "alright." Appetite has been good. Reports disturbances in sleep due to ongoing cramps. He denies any falls in the interval. He did not notice any appreciable difference since starting the Guanfacine last night, only that it made him a little tired. He is agreeable to continuing this medication at night to see if it assists with ADHD symptoms.     Suspect patient has poor medical understanding and this may be contributing to difficulties with his care. He tells me that he was diagnosed with Schizophrenia at age 12. When asked about symptoms, he denies a history of hallucinations, delusions, or paranoia (reports paranoia in setting of stimulant use that resolved when medication was discontinued). He does not have disorganized speech or thought process. He does not have negative symptoms of anhedonia, avolition, or asociality. His affect is somewhat reactive.   Pt also says he was diagnosed with Bipolar after his mother  2 years ago because he was having mood swings. He says his Adderall was prescribed for Bipolar disorder. He " "denies symptoms of mansi including decreased need for sleep, increased energy, increased goal directed activity, fast/loud speech lasting days- weeks. He denies depressed mood lasting for more than 1 day. Discussed with patient that based on what he just told me, I do no believe him to have Schizophrenia or Bipolar disorder.        OBJECTIVE    Vital Signs:  Temp:  [97.8 °F (36.6 °C)-97.9 °F (36.6 °C)]   Pulse:  []   Resp:  [18-20]   BP: (116-138)/(71-86)   SpO2:  [93 %-99 %]     Mental Status Exam:  General Appearance: appears older than stated age, overweight  Behavior: cooperative, appropriate eye-contact  Involuntary Movements and Motor Activity: no abnormal involuntary movements noted; no tics, no tremors, no akathisia, no dystonia, no evidence of tardive dyskinesia; no psychomotor agitation or retardation  Gait and Station: unable to assess - patient lying down or seated  Speech and Language: intact; normal rate, rhythm, volume, tone, and pitch; conversational, spontaneous, and coherent; speaks and understands English proficiently and fluently; repeats words and phrases, no word finding difficulties are noted  Mood: "alright"  Affect: appropriate to situation and context  Thought Process and Associations: intact; linear, goal-directed, organized, and logical; no loosening of associations noted  Perceptual Disturbances: denies hallucinations  Thought Content and Perceptions:: no suicidal or homicidal ideation, no auditory or visual hallucinations, no paranoid ideation, no ideas of reference, no evidence of delusions or psychosis  Sensorium and Orientation: grossly intact, oriented fully (to person, place, time and situation)  Recent and Remote Memory: grossly intact, able to recall relevant and salient information from the recent and remote past  Attention and Concentration: grossly intact, attentive to the conversation and not readily distractible  Fund of Knowledge: grossly intact, consistent with " educational level achieved  Insight: fair - demonstrates awareness of illness  Judgment: limited - at times has been noncompliant with health provider's recommendations and instructions    CAM ICU positive? no      ASSESSMENT & RECOMMENDATIONS   ADHD, combined type       Scheduled Medication(s):  Continue Guanfacine 1mg nightly for ADHD. ADHD can manifest in many ways and may be contributing to the impulsiveness seen during hospital stay. Needs close monitoring of vital signs and cardiac parameters. Per Up To Date guidelines, should only increase dose to 2mg if insufficient response after 3-4 weeks. Therefore, continue this dose for now and defer changes to outpatient psychiatry.     PRN Medication(s):  None     Delirium Behavior Management  PLEASE utilize PRN meds first for agitation. Minimize use of PHYSICAL restraints OR have periods of being out of physical restraints if possible.  Keep window shades open and room lit during day and room dim at night in order to promote normal sleep-wake cycles  Encourage family at bedside. [place] patient often to situation, location, date.  Continue to Limit or Discontinue use of Narcotics, Benzos and Anti-cholinergic medications as they may worsen delirium.  Continue medical workup for causative etiology of Delirium.     Risk Assessment / Legal Status  Patient does not meet criteria for PEC or involuntary inpatient psychiatric admission at this time. Recommend to rescind PEC if one was placed. Patient is not currently an imminent danger to self or others and is not gravely disabled due to a psychiatric illness.     Follow-up:  Will sign off. Final recommendations as stated above. Patient can follow-up with outpatient psychiatry. If the patient does not have an outpatient psychiatrist, resources for outpatient clinics will be provided in patient's discharge instructions.     Disposition:   Defer to primary team.      Please contact ON CALL psychiatry service (24/7) for any  acute issues that may arise.    Dr. Eric Gudino   Psychiatry  [facility]  5/16/2025 11:15 AM        --------------------------------------------------------------------------------------------------------------------------------------------------------------------------------------------------------------------------------------    CONTINUED OBJECTIVE clinical data & findings reviewed and noted for above decision making    Current Medications:   Scheduled Meds:    dapagliflozin propanediol  10 mg Oral Daily    enoxparin  60 mg Subcutaneous Q12H    guanFACINE  1 mg Oral QHS    metoprolol succinate  100 mg Oral BID    potassium chloride  40 mEq Oral Q2H     PRN Meds:   Current Facility-Administered Medications:     acetaminophen, 650 mg, Oral, Q4H PRN    aluminum-magnesium hydroxide-simethicone, 30 mL, Oral, QID PRN    dextromethorphan-guaiFENesin  mg/5 ml, 10 mL, Oral, Q4H PRN    hydrOXYzine pamoate, 25 mg, Oral, Q6H PRN    melatonin, 6 mg, Oral, Nightly PRN    methocarbamoL, 500 mg, Oral, QID PRN    metoclopramide, 10 mg, Intravenous, Q6H PRN    naloxone, 0.02 mg, Intravenous, PRN    nitroGLYCERIN, 0.4 mg, Sublingual, Q5 Min PRN    ondansetron, 4 mg, Intravenous, Q8H PRN    polyethylene glycol, 17 g, Oral, Daily PRN    senna-docusate, 1 tablet, Oral, BID PRN    sodium chloride 0.9%, 10 mL, Intravenous, PRN    traZODone, 50 mg, Oral, Nightly PRN    Allergies:   Review of patient's allergies indicates:   Allergen Reactions    Compazine [prochlorperazine]      anxiety       Vitals  Vitals:    05/16/25 1036   BP: 126/83   Pulse: 79   Resp: 19   Temp: 97.9 °F (36.6 °C)       Labs/Imaging/Studies:  Recent Results (from the past 24 hours)   Potassium    Collection Time: 05/15/25  9:21 PM   Result Value Ref Range    Potassium 5.4 (H) 3.5 - 5.1 mmol/L   Magnesium    Collection Time: 05/15/25  9:21 PM   Result Value Ref Range    Magnesium  2.1 1.6 - 2.6 mg/dL   Potassium    Collection Time:  05/16/25 12:18 AM   Result Value Ref Range    Potassium 3.1 (L) 3.5 - 5.1 mmol/L   Renal Function Panel    Collection Time: 05/16/25  8:14 AM   Result Value Ref Range    Sodium 136 136 - 145 mmol/L    Potassium 4.0 3.5 - 5.1 mmol/L    Chloride 91 (L) 95 - 110 mmol/L    CO2 28 23 - 29 mmol/L    Glucose 135 (H) 70 - 110 mg/dL    BUN 47 (H) 6 - 20 mg/dL    Creatinine 2.1 (H) 0.5 - 1.4 mg/dL    Calcium 9.9 8.7 - 10.5 mg/dL    Albumin 3.7 3.5 - 5.2 g/dL    Phosphorus Level 5.6 (H) 2.7 - 4.5 mg/dL    Anion Gap 17 (H) 8 - 16 mmol/L    eGFR 44 (L) >60 mL/min/1.73/m2   Magnesium    Collection Time: 05/16/25  8:14 AM   Result Value Ref Range    Magnesium  2.2 1.6 - 2.6 mg/dL   Phosphorus    Collection Time: 05/16/25 10:08 AM   Result Value Ref Range    Phosphorus Level 4.6 (H) 2.7 - 4.5 mg/dL   Magnesium    Collection Time: 05/16/25 10:08 AM   Result Value Ref Range    Magnesium  2.4 1.6 - 2.6 mg/dL   Comprehensive Metabolic Panel    Collection Time: 05/16/25 10:08 AM   Result Value Ref Range    Sodium 137 136 - 145 mmol/L    Potassium 4.4 3.5 - 5.1 mmol/L    Chloride 91 (L) 95 - 110 mmol/L    CO2 30 (H) 23 - 29 mmol/L    Glucose 96 70 - 110 mg/dL    BUN 47 (H) 6 - 20 mg/dL    Creatinine 2.1 (H) 0.5 - 1.4 mg/dL    Calcium 10.5 8.7 - 10.5 mg/dL    Protein Total 8.3 6.0 - 8.4 gm/dL    Albumin 3.7 3.5 - 5.2 g/dL    Bilirubin Total 0.7 0.1 - 1.0 mg/dL     40 - 150 unit/L    AST 32 11 - 45 unit/L    ALT 26 10 - 44 unit/L    Anion Gap 16 8 - 16 mmol/L    eGFR 44 (L) >60 mL/min/1.73/m2     Imaging Results               CT Head Without Contrast (Final result)  Result time 05/13/25 20:59:05      Final result by Justin Lew MD (05/13/25 20:59:05)                   Impression:      1. Suspected right parietal scalp small soft tissue contusion without displaced skull fracture or acute intracranial abnormality identified.  2. No CT evidence of cervical spine acute osseous traumatic injury.  3. Enlarged heterogeneous thyroid  suggesting underlying nodules.  Further evaluation with elective/nonemergent thyroid ultrasound can be obtained as warranted.  This report was flagged in Epic as containing an incidental finding.      Electronically signed by: Justin Lew MD  Date:    05/13/2025  Time:    20:59               Narrative:    EXAMINATION:  CT HEAD WITHOUT CONTRAST; CT CERVICAL SPINE WITHOUT CONTRAST    CLINICAL HISTORY:  Fall and on Eliquis;; Fall with head trauma;    TECHNIQUE:  Low dose axial CT images obtained throughout the head and cervical spine without intravenous contrast. Sagittal and coronal reconstructions were performed.    COMPARISON:  Chest radiograph 05/07/2025    FINDINGS:  Head CT:    Intracranial compartment: Brain appears normally formed. Ventricles and sulci are normal in size for age without evidence of hydrocephalus. No extra-axial blood or fluid collections.    The brain parenchyma appears normal. No parenchymal mass, hemorrhage, edema or major vascular distribution infarct.    Skull/extracranial contents (limited evaluation): Small focus of subcutaneous fat stranding overlying the right parietal calvarium suggesting small scalp contusion.  No fracture. Mastoid air cells and paranasal sinuses are essentially clear.  Imaged portions of the orbits are within normal limits.    Cervical spine CT: Straightening of the cervical lordosis.  Well corticated suspected anatomic variant at the left transverse process of C1.  Vertebral body and intervertebral disc space heights appear relatively maintained.  Bones are well mineralized.  Dens and lateral masses are well aligned and intact.  No displaced fracture, dislocation or significant listhesis.  No destructive osseous process.  No significant degenerative change, spinal canal stenosis or neural foraminal narrowing seen at any level.  No prevertebral soft tissue thickening.  No paraspinal mass or fluid collection.  No subcutaneous emphysema or radiopaque foreign  body.    Partially imaged catheter seen within the left subclavian vein.  Left thyroid is asymmetrically larger and heterogeneous suggesting underlying nodules.  There is associated mass effect with mild rightward shift and narrowing of the trachea which remains patent.  Imaged lung apices are clear.                                        CT Cervical Spine Without Contrast (Final result)  Result time 05/13/25 20:59:05      Final result by Justin Lew MD (05/13/25 20:59:05)                   Impression:      1. Suspected right parietal scalp small soft tissue contusion without displaced skull fracture or acute intracranial abnormality identified.  2. No CT evidence of cervical spine acute osseous traumatic injury.  3. Enlarged heterogeneous thyroid suggesting underlying nodules.  Further evaluation with elective/nonemergent thyroid ultrasound can be obtained as warranted.  This report was flagged in Epic as containing an incidental finding.      Electronically signed by: Justin Lew MD  Date:    05/13/2025  Time:    20:59               Narrative:    EXAMINATION:  CT HEAD WITHOUT CONTRAST; CT CERVICAL SPINE WITHOUT CONTRAST    CLINICAL HISTORY:  Fall and on Eliquis;; Fall with head trauma;    TECHNIQUE:  Low dose axial CT images obtained throughout the head and cervical spine without intravenous contrast. Sagittal and coronal reconstructions were performed.    COMPARISON:  Chest radiograph 05/07/2025    FINDINGS:  Head CT:    Intracranial compartment: Brain appears normally formed. Ventricles and sulci are normal in size for age without evidence of hydrocephalus. No extra-axial blood or fluid collections.    The brain parenchyma appears normal. No parenchymal mass, hemorrhage, edema or major vascular distribution infarct.    Skull/extracranial contents (limited evaluation): Small focus of subcutaneous fat stranding overlying the right parietal calvarium suggesting small scalp contusion.  No fracture. Mastoid air  cells and paranasal sinuses are essentially clear.  Imaged portions of the orbits are within normal limits.    Cervical spine CT: Straightening of the cervical lordosis.  Well corticated suspected anatomic variant at the left transverse process of C1.  Vertebral body and intervertebral disc space heights appear relatively maintained.  Bones are well mineralized.  Dens and lateral masses are well aligned and intact.  No displaced fracture, dislocation or significant listhesis.  No destructive osseous process.  No significant degenerative change, spinal canal stenosis or neural foraminal narrowing seen at any level.  No prevertebral soft tissue thickening.  No paraspinal mass or fluid collection.  No subcutaneous emphysema or radiopaque foreign body.    Partially imaged catheter seen within the left subclavian vein.  Left thyroid is asymmetrically larger and heterogeneous suggesting underlying nodules.  There is associated mass effect with mild rightward shift and narrowing of the trachea which remains patent.  Imaged lung apices are clear.                                       X-Ray Chest AP Portable (Final result)  Result time 05/13/25 18:24:46      Final result by Marco A Benson MD (05/13/25 18:24:46)                   Impression:      Cardiomegaly without additional evidence of acute cardiopulmonary process.      Electronically signed by: Marco A Benson  Date:    05/13/2025  Time:    18:24               Narrative:    EXAMINATION:  XR CHEST AP PORTABLE    CLINICAL HISTORY:  Chest Pain;    TECHNIQUE:  Single frontal view of the chest was performed.    COMPARISON:  Radiographs of the chest from 05/07/2025    FINDINGS:  Heart size is enlarged.  Pulmonary vasculature within normal limits.  A left subclavian single lead cardiac conduction device is present with lead terminations overlying the right ventricle.  No evidence of focal consolidation, pneumothorax, or pleural effusion.  No evidence of acute osseous  process.

## 2025-05-16 NOTE — NURSING
"0600 - Patient IV saline came out of IV catheter. IV catheter however remained in place. Patient did experience bleeding from IV site. A good amount of blood lost of 1-2 minutes. Patient stated that he feels "a little weak". Blood pressure 123/79. HR 82. However patient still states he feels weak. Asked patient if he wanted to lay down. Patient refused, stating "sitting makes me feel better."  Patient requested a cup of water.     0615 Patient stated that he feels better. Blood pressure recheck was 125/80. HR 85. Repeatedly educated patient on the importance of being cautious with IV lines. Plan of care continues.     "

## 2025-05-16 NOTE — DISCHARGE INSTRUCTIONS
REFERRAL RECOMMENDATIONS FOR SUBSTANCE ABUSE & MENTAL HEALTH      IN CASE OF SUICIDAL THINKING, call the National Suicide Hotline Number: 988    988 Suicide & Crisis Lifeline: 239 , 8-561-098-GRTK (4968)  https://988Lonestar Heartline.org       COMMUNITY ADDICTION CLINICS:     [facility]: [address] [facility] [place], [phone] -or- [address] [place] [address]    [facility] [place]: [address], [place]  [address]     [facility]: Clinics [phone]; Crisis [phone]    [facility]: [address] [place] [address]    [facility]: [address] [place] [address]    [facility]: [address] [place] [address],    [facility] Center: 2nd Floor [address] [place] [address]    [facility]: [address], [place] [address]    [facility], [address] [place], [place] [address]    [facility]: [address] [place] [address] [phone]  (serves youth 16-23 years old)    [facility]: [facility]/Comfort/[place]/[facility] [phone]    [facility]: [address] [facility], [phone]    [facility]: [address] [facility] [phone]    [facility]: [address] [facility], [address], [phone]     [facility]: [address] [facility] [place] [place], [phone], [phone]    RESOURCES IN OTHER [facility]:     [facility]: [address], [place], [phone], [phone]    [facility]: [address] [facility], [address], [phone]    [facility], [address] [place] [address], [place], [phone]    [facility] Health: [facility], [place],  [phone]    [facility] Behavioral Health, [address], [phone] ([facility])    [facility], [address] [facility], [phone] ([facility])    [facility] Health, [address], [address], [place], [phone] ([place] [place], [facility])    [facility], [address], [place], [phone] ([facility])    [facility] Hotline [phone], [phone]    [facility], [address] [facility], [facility], [address] [facility] [address], Formerly [facility] Behavioral Health Center, [address] [facility] [address], [facility] Center, [address] [facility] [facility]al [facility], [address], [place]    [facility], [address] [place] [address], [place] [phone]    [facility], [address] [facility] [phone]    [facility], [address] [place] [address], [phone]    [facility] for Addiction Recovery, [address] [place] [address], [phone]    MENTAL HEALTH:     [facility] - Outpatient Clinic  [phone]    [facility] - General Psychiatry Intensive Outpatient Program  [facility] (formerly known as the [facility]U)  [phone], option 3    [facility] - Dual Diagnosis Intensive Outpatient Program  [facility] (formerly known as the [facility]BU)  [phone], option 2      COMMUNITY MENTAL HEALTH CENTERS:     [facility]  ([facility])  Serves [facility], and [facility] residents.  Serves uninsured patients & those with Medicaid.  Main location: [address] [place] [address],  [place] [id number] [phone]  Walk-in's available during regular business hours.  24/7 Crisis Line: [phone]    [facility]  (aka [facility])  Serves [facility] residents.  Serves uninsured patients, those with Medicaid and some private plans.  Walk-in's available during regular business hours.  Primary care services available as well.  [facility]: [address] [facility] [place] [address];  [phone]  [place]: [address] [place] [address];  [phone]  24/7 Crisis Line: [phone]    [facility]  Serves uninsured patients & those with Medicaid, call for more info.  Primary care, pediatrics, HIV treatment, and dentistry services available as well.  Three locations.  [phone]    [employer] [facility]?Corporate Office  Serves patients with Medicaid, Medicare, and private insurance  [address]  [place],?[place] [address] [phone]    [facility]  Serves uninsured on a sliding scale, as well as Medicaid, Medicare, and private plans.  Eight locations around the [facility] area.  [phone]    [facility]  Serves uninsured patients & those with Medicaid, private insurances.  Primary care available as well.  [phone]  [address] [place] [address]    [facility]  Serves veterans who were honorably discharged.  [address] [place] [address]  [phone]  24/7 Veterans Crisis Line: [phone] (Press 1)    If you have private insurance and need to find a specialist, please contact your insurance network to request a list of providers covered by your benefits.      MENTAL HEALTH/ADDICTIVE DISORDERS:     AA ([phone]), NA ([phone])   National Suicide Prevention Lifeline- Call 1-598.621.8643 Available 24 hours everyday  [facility] [phone]; Crisis Line [phone] - Call for  options A-F:  Intensive Outpatient Treatment/ Day programs   [facility], please contact   [facility], please contact [phone] or [phone] to speak with an admissions counselor.  Behavioral Health Group (Methadone Maintenance)   [address] [place] [address], [phone]  [address] [place] [address] [phone]  [facility], [address] [address], [phone]  [facility] [phone]  [facility] please contact [phone]  [facility], [address] [facility], 4th [place] [address] Phone: [phone]   P[facility] Audit Office: [address], [place] [address], [phone]  [place] Office: [address] [facility], [address], [place] [address], [phone]  [place] Office: [address], [place] [address] [phone]    Outpatient Substance Abuse Treatment   [facility]al Health Group (Methadone Maintenance)   [address] [place] [address], [phone]  [address] [place] [address] [phone]  [facility], [address] [address], [phone]  [facility]  [place] Office: [address], [place] [address], [phone]  [place] Office: [address] [facility], [address], [place] [address], [phone]  [place] Office: [address], [place] [address] [phone]  [facility], [address] [place] [address] [phone]   [facility], [address] [facility], [address], [phone]  [facility], [address] [place] [address] [phone]    Residential Substance Abuse Treatment   [address], [phone] or [phone]  [facility], [address] [place] [address], [phone]  [facility] (men only) [address] [place] [address], [phone]  [facility] (women only) [address] [place], [place] [address] [phone]  [facility], [address] [facility], [place], [place] [address] [phone]  [facility] [place] (women only), intakes at [address] [facility], [phone]  [facility] (7-day program, $100, [address].[place], [phone], [phone], [phone])  [facility] (Men only, [phone]), [address], [place] (Vets*/Non-Vets)  [facility] (Men only, $400/month program fee) [address] [facility] [place], [phone]  [facility] (Women over age 39 only), [address] [place] [address], [phone]    Out of Area:    [place], [address] [facility] [address], [place], [place] [phone])  [facility] (men only), [address] [place] [address], [phone]  [facility], [address] [place], [place] ([phone])  [place], [address] [place] [address] Dr. Najera, [place] ([phone])  [facility], [address] [place] [address], [phone]  [facility] (Women only, has to have mental illness, can be homeless or substance abuser), [phone]      DOMESTIC VIOLENCE RESOURCES:     Advocacy  [facility] (NOFJC)  [address] [address]    [facility] ? [phone]  Services provided: emergency shelter, individual advocacy, information and referrals, group support, children's program, medical advocacy, forensic medical exams, primary care, legal assistance, counseling, safety planning, and caregiver support    [facility]  Confidential location  [facility] ? [phone]  Services provided: short term emergency shelter, all services provided are free of charge    [facility]  Multiple locations in [facility], [place], [facility], [place], and [facility] ([place], [place], and [place])    [facility] ? [phone]  Services provided: emergency  shelter, individual advocacy, information and referrals, group support, children's program, medical advocacy, legal assistance in obtaining restraining orders, counseling, safety planning, and caregiver support    [facility]   Emergency Shelter   [phone]  Emergency Services ,Legal and Financial Assistance Services ,Housing Services ,Support Services     [place] [facility] longterm   [phone]  Emergency Services ,Counseling Services , Housing Services ,Support Services ,Children's Services     [facility]OM[facility]ISION  [address] [place] [address]  WWAV ? [phone]  Services provided: advocacy, health education and supportive services, specializing in free healing services for marginalized groups, including LGBTQ individuals and sex workers    SEXUAL TRAUMA AWARENESS AND RESPONSE (STAR)  [address] [place] [address]    STAR ? [phone]  Services provided: individual advocacy, information and referrals, group support, medical advocacy, legal assistance, counseling, and safety planning for survivors of sexual assault    [facility]  [address] [place] [address]  [facility] Forensic Program ? [phone]  Services provided: free forensic medical exams for sexual assault and domestic violence, which can be performed up to 5 days after an incident. It is not necessary to make a police report to receive a forensic medical exam    Legal  [facility]  [address] [facility] 200[place] [address]  Project SAVE ? [phone]  Services provided: free emergency legal representation for survivors of doemstic violence residing in [facility]. Legal services may include temporary restraining orders, temporary child support, custody, and use of property    [facility] LEGAL SERVICES (SLLS)  [address] [facility] [address], [place] [address]  SLLS ? [phone]  Services provided: free legal representation for survivors of domestic  violence residing in [place]. Legal services may include temporary child support, custody, and divorce      HOTLINES:     [facility] DOMESTIC VIOLENCE HOTLINE  [phone]    Services provided: free and confidential hotline for victims and survivors of domestic violence. All calls will be routed to a domestic violence service provided in the victim or survivor's area    NATIONAL HUMAN TRAFFICKING HOTLINE  [phone]    Services provided: national anti-trafficking hotline serving victims and survivors of human trafficking. Provides information about local resources, and access to safe space to report tips, seek services, and ask for help    VIA LINK  211 or [phone]    Service provided: counselors can provide crisis counseling. Counselors can also provide information and referrals to programs which can help with needs such as food, shelter, medical care, financial assistance, mental health services, substance abuse treatment, senior services, childcare, and more      HOMELESS SHELTERS:      Homeless shelters  [facility]  Emergency shelter for individuals and families  [address]  [phone]  [facility]  Emergency shelter for men only  Meals daily 6am, 2pm, & 6pm  Clothing, case management M-F by appointment (ID/job/housing/legal assistance), mail  [address] [facility]  [phone]  [facility]  Emergency shelter for men  [address] [facility]  [phone]  Emergency shelter for women  [address] [facility]  [phone]  Breakfast & lunch daily, dinner M-F  Case management, job counseling services   [facility]  Emergency shelter for teens and young adults up to 20yo  [address] [facility]  [phone]  [facility]  Emergency shelter for women over 17yo and their kids  [address] [place] [address]  [phone]  [facility]  Day program, meals M-F 1PM (arrive early)  Showers, laundry, hygiene kits, showers, phones, legal  aid, notary services, case management, ID assistance  [address] [facility]   [phone] M-F 8am-2:30pm  [facility]  Day program  MTWF 7:30am-3:30pm,  8:30am-3:30pm  Crisis intervention, employment assistance, food/clothing, hygiene kits, bus tokens, mail  [address]  [phone]  [facility]  Mobile outreach for homeless persons in [facility]  [phone]  Healthcare for the Homeless  Primary healthcare, case management, dental services, TB placement  Call ahead  [address]  2nd Floor  [phone]  [facility]  Connects homeless people with their loved ones in other cities by providing transportation costs   [phone]      [place] RESOURCES:     [facility] Crisis Response Team:    [facility] 12 ([facility], [place], and [facility]) ([facility])  [phone]      Outpatient Mental Health & Addiction Clinic Resources for both [facility]:    [facility] Mental Healthcare Resources  Website: www.pbr.org  Main Number: 782-701-3276    [facility] ([facility])  [address] ([address]) [place] [address]  [phone]    [facility] ([facility])  [address] ([address] [facility]) [place] [address]  [phone]    [facility]  [address] ([address]) [place] [address] [phone]    [facility]  [address] ([address] [facility] [place] [address] [phone]      Addiction Treatment Resources for both [facility]:    [facility] (Detox, Residential, PHP, IOP, and Aftercare Programs)  [address] [place] [address] [phone]    [facility] (Residential, IOP, Transitional Living, and Aftercare Programs)  [address] [place] [address] [phone]    [facility] (Inpatient, Residential, Detox, IOP, Outpatient, and Aftercare Programs)  [facility] [place], [place] [address] [phone] or toll free at [phone]      Outpatient Mental Health Psychotherapy Resources for both [facility] [place]:    Suellen Bean, [facility]  [address]  [place] [address]  [phone]  Specialties: Depression, Anxiety, and Life Transitions    Mercedes Rosenbaum, PhD  [address]  [place] [address]  [phone]  Specialties: Testing and Evaluation, Education and Learning Disabilities, and ADHD    Fabiola Sebastian, [facility] [address] [place] [address]  [phone]  Specialties: Obsessive-Compulsive (OCD), Depression, and Relationship Issues    Paula Jackson, [facility] [address] [place] [address]  [phone]  Specialties: Trauma & PTSD, Mood Disorders, and Anxiety    Paula Yoder, PhD, [facility]  [facility] [address] [place] [address]  [phone]  Specialties: Family Conflict, Child, and Relationship Issues    aMgda Majano, [facility] Counseling [place] [address] [phone]  Specialties: Anxiety, Depression, and Anger Management        IN CASE OF SUICIDAL THINKING, call the National Suicide Hotline Number: 988    988 Suicide & Crisis Lifeline: 988 , 1-672-672-TALK (3283)  Provides 24/7, free and confidential support for people in distress, prevention and crisis resources for you or your loved ones, and best practices for professionals.    Call, text or chat.  https://988Redapt.org

## 2025-05-16 NOTE — ASSESSMENT & PLAN NOTE
Body mass index is 51.55 kg/m². Morbid obesity complicates all aspects of disease management from diagnostic modalities to treatment.

## 2025-05-16 NOTE — SUBJECTIVE & OBJECTIVE
Interval History: diuresing well, ongoing cramps and hypoK, CVP 3 in pm so Lasix gtt changed to Lasix 80 IV q12 per cards    Review of Systems   All other systems reviewed and are negative.    Objective:     Vital Signs (Most Recent):  Temp: 97.8 °F (36.6 °C) (05/16/25 0709)  Pulse: 74 (05/16/25 0709)  Resp: 19 (05/16/25 0709)  BP: 126/80 (05/16/25 0709)  SpO2: 95 % (05/16/25 0709) Vital Signs (24h Range):  Temp:  [97.8 °F (36.6 °C)-97.9 °F (36.6 °C)] 97.8 °F (36.6 °C)  Pulse:  [] 74  Resp:  [18-20] 19  SpO2:  [93 %-99 %] 95 %  BP: (116-138)/(71-86) 126/80     Weight: (!) 155.2 kg (342 lb 2.5 oz)  Body mass index is 49.54 kg/m².    Intake/Output Summary (Last 24 hours) at 5/16/2025 1012  Last data filed at 5/16/2025 1005  Gross per 24 hour   Intake 2713 ml   Output 35007 ml   Net -8567 ml         Physical Exam  Vitals reviewed.               Significant Labs: All pertinent labs within the past 24 hours have been reviewed.    Significant Imaging: I have reviewed all pertinent imaging results/findings within the past 24 hours.

## 2025-05-16 NOTE — ASSESSMENT & PLAN NOTE
-admit to cardiac stepdown unit - tele monitoring  -CHF pathway  -cardiology consulted, appreciate  Diurese with Lasix gtt at 20  Home toprolXL   Resumed Farxiga 5/15  HOLD home aldactone, resume asap when ALMAS improves  HOLD entresto,  resume asap when ALMAS improves  Not giving Home verquvo non-formulary   - strict Is/Os   - daily weights, preferably standing   - fluid restrict 1.5L while inpatient  - tele  - keep Mg >2, Phos >3, K >4

## 2025-05-16 NOTE — PLAN OF CARE
Patient alert and oriented x4. Patient free from fall and injury. Fall precautions remained in place. VSS. Patient remained on room air. Sats 93-99%. NSR on telemetry. Plan of care reviewed with the patient. Lasix gtt continued per orders. Intermittent pauses d/t loss of IV access. Intake and output monitored. Daily weight obtained. K replaced overnight. Patient still with complaints of abd and leg cramping. Patient nonadherent to fluid restriction. Patient continuously re-educated on importance of compliance with fluid restriction. Patient denies pain, chest pain, headaches, or SOB. No acute distress noted. Plan of care continues.       Problem: Adult Inpatient Plan of Care  Goal: Plan of Care Review  5/16/2025 0627 by Armin Caal LPN  Outcome: Ongoing  5/16/2025 0627 by Armin Caal LPN  Outcome: Ongoing  Goal: Patient-Specific Goal (Individualized)  5/16/2025 0627 by Armin Caal LPN  Outcome: Ongoing  5/16/2025 0627 by Armin Caal LPN  Outcome: Ongoing  Goal: Absence of Hospital-Acquired Illness or Injury  5/16/2025 0627 by Armin Caal LPN  Outcome: Ongoing  5/16/2025 0627 by Armin Caal LPN  Outcome: Ongoing  Goal: Optimal Comfort and Wellbeing  5/16/2025 0627 by Armin Caal LPN  Outcome: Ongoing  5/16/2025 0627 by Armin Caal LPN  Outcome: Ongoing  Goal: Readiness for Transition of Care  5/16/2025 0627 by Armin Caal LPN  Outcome: Ongoing  5/16/2025 0627 by Armin Caal LPN  Outcome: Ongoing     Problem: Bariatric Environmental Safety  Goal: Safety Maintained with Care  5/16/2025 0627 by Armin Caal LPN  Outcome: Ongoing  5/16/2025 0627 by Armin Caal LPN  Outcome: Ongoing     Problem: Infection  Goal: Absence of Infection Signs and Symptoms  5/16/2025 0627 by Armin Caal LPN  Outcome: Ongoing  5/16/2025 0627 by Armin Caal LPN  Outcome: Ongoing     Problem: Wound  Goal: Optimal Coping  5/16/2025 0627 by Armin Caal LPN  Outcome: Ongoing  5/16/2025 0627  by Armin Caal LPN  Outcome: Ongoing  Goal: Optimal Functional Ability  5/16/2025 0627 by Armin Caal LPN  Outcome: Ongoing  5/16/2025 0627 by Armin Caal LPN  Outcome: Ongoing  Goal: Absence of Infection Signs and Symptoms  5/16/2025 0627 by Armin Caal LPN  Outcome: Ongoing  5/16/2025 0627 by Armin Caal LPN  Outcome: Ongoing  Goal: Improved Oral Intake  5/16/2025 0627 by Armin Caal LPN  Outcome: Ongoing  5/16/2025 0627 by Armin Caal LPN  Outcome: Ongoing  Goal: Optimal Pain Control and Function  5/16/2025 0627 by Armin Caal LPN  Outcome: Ongoing  5/16/2025 0627 by Armin Caal LPN  Outcome: Ongoing  Goal: Skin Health and Integrity  5/16/2025 0627 by Armin Caal LPN  Outcome: Ongoing  5/16/2025 0627 by Armin Caal LPN  Outcome: Ongoing  Goal: Optimal Wound Healing  5/16/2025 0627 by Armin Caal LPN  Outcome: Ongoing  5/16/2025 0627 by Armin Caal LPN  Outcome: Ongoing     Problem: Fall Injury Risk  Goal: Absence of Fall and Fall-Related Injury  5/16/2025 0627 by Armin Caal LPN  Outcome: Ongoing  5/16/2025 0627 by Armin Caal LPN  Outcome: Ongoing

## 2025-05-16 NOTE — PROGRESS NOTES
[facility] - Cardiology [facility] Medicine  Progress Note    Patient Name: Michael Conti  MRN: [id number]  Patient Class: IP- Inpatient   Admission Date: 5/13/2025  Length of Stay: 2 days  Attending Physician: Akua Ontiveros MD  Primary Care Provider: Lele Verde NP (Inactive)        Subjective     Principal Problem:Acute on chronic combined systolic and diastolic heart failure        HPI:  23 y/o AAM w/ Non-Ischemic Cardiomyopathy_HFrEF EF10-15%, s/p AICD, severe obesity, presents to ED with shortness of breath, orthopnea.     He was admitted to [facility] from 5/7-5/11 for management of heart failure exacerbation. Primary Cardiologist is Dr. Frannie García ([facility]) during admission he was treated with IV lasix, IV milrinone,  restarted on Toprol 100mg, Entresto & Aldactone 2 days into admission.  At discharge started on Torsemide 100mg & Verquvo 2.5mg daily.   Farxiga & Bidil were held during inpatient admission.  He reports that as an outpatient Bidil/Entresto/Spironolactone were on hold by his cardiologist.  He reports feeling well the day after discharge and has continued Toprol/Farxiga/Torsemide/Verquvo.   He is unsure if he has had a left heart cath in past.     He reported started feeling dyspnea, orthopnea, WILLIS w/ pre-syncope symptoms on oral regimen, reports his dry weight is 340 but current weight is still high @ 367-370 pounds, came to [facility] today for evaluation due to prior admission here, felt he requires more diuresis.  He has no chest pain complaints, unclear if he has had increase dietary indiscretion.  He is concerned about how long he will be admitted to ensure adequate diuresis.     Given lasix 80mg iV in ED and has had 7951-0325 cc of UOP.      Overview/Hospital Course:  UOP poor on lasix drip 10 mg/hr. Increase to 30 mg/hr + metolazone.     Interval History: c/o cramping of abd, relief with GI cocktail.  Lasix gtt down to 20 from 30 per cards. Cresencio consulted, started on new Rx for  known ADHD off of his vyvanse and other stimulants 2/2 cardiac issues.    Review of Systems   All other systems reviewed and are negative.    Objective:     Vital Signs (Most Recent):  Temp: 97.5 °F (36.4 °C) (05/15/25 0711)  Pulse: 86 (05/15/25 0834)  Resp: 18 (05/15/25 0834)  BP: 131/84 (05/15/25 0834)  SpO2: 96 % (05/15/25 0834) Vital Signs (24h Range):  Temp:  [97 °F (36.1 °C)-98.9 °F (37.2 °C)] 97.5 °F (36.4 °C)  Pulse:  [] 86  Resp:  [18-22] 18  SpO2:  [94 %-99 %] 96 %  BP: (125-146)/() 131/84     Weight: (!) 161.5 kg (356 lb 0.7 oz)  Body mass index is 51.55 kg/m².    Intake/Output Summary (Last 24 hours) at 5/15/2025 0958  Last data filed at 5/15/2025 0923  Gross per 24 hour   Intake 2349 ml   Output 7903 ml   Net -5554 ml         Physical Exam  Vitals reviewed.               Significant Labs: All pertinent labs within the past 24 hours have been reviewed.    Significant Imaging: I have reviewed all pertinent imaging results/findings within the past 24 hours.      Assessment & Plan  Acute on chronic combined systolic and diastolic heart failure  Non-ischemic cardiomyopathy  -admit to cardiac stepdown unit - tele monitoring  -CHF pathway  -cardiology consulted, appreciate  Diurese with Lasix gtt at 20  Home toprolXL   Resumed Farxiga 5/15  HOLD home aldactone, resume asap when ALMAS improves  HOLD entresto,  resume asap when ALMAS improves  Not giving Home verquvo non-formulary   - strict Is/Os   - daily weights, preferably standing   - fluid restrict 1.5L while inpatient  - tele  - keep Mg >2, Phos >3, K >4  Elevated serum creatinine  Cardiorenal  Diurese  - avoid nephrotoxic meds  - renally-dose all meds  - strict Is/Os  Severe obesity (BMI >= 40)  Body mass index is 51.55 kg/m². Morbid obesity complicates all aspects of disease management from diagnostic modalities to treatment.   Primary hypertension  Management as per primary problem.   Microcytic anemia  - f/u labs  Fall  Pt denies presyncope,  states it was a mechanical fall  ADHD (attention deficit hyperactivity disorder)  Psych consulted, appreciate recs  - guanfacine 1mg PO qhs started 5/15 pm    VTE Risk Mitigation (From admission, onward)           Ordered     enoxaparin injection 60 mg  Every 12 hours         05/13/25 2048     IP VTE HIGH RISK PATIENT  Once         05/13/25 2048     Place sequential compression device  Until discontinued         05/13/25 2048                    Discharge Planning   YU: 5/19/2025     Code Status: Full Code   Medical Readiness for Discharge Date:   Discharge Plan A: Home with family                Please place Justification for DME        Akua Ontiveros MD  Department of Hospital Medicine   [facility] - Cardiology Stepdown

## 2025-05-17 LAB
ALBUMIN SERPL BCP-MCNC: 3.5 G/DL (ref 3.5–5.2)
ALP SERPL-CCNC: 129 UNIT/L (ref 40–150)
ALT SERPL W/O P-5'-P-CCNC: 25 UNIT/L (ref 10–44)
ANION GAP (OHS): 15 MMOL/L (ref 8–16)
AST SERPL-CCNC: 26 UNIT/L (ref 11–45)
BILIRUB SERPL-MCNC: 0.6 MG/DL (ref 0.1–1)
BUN SERPL-MCNC: 48 MG/DL (ref 6–20)
CALCIUM SERPL-MCNC: 9.6 MG/DL (ref 8.7–10.5)
CHLORIDE SERPL-SCNC: 89 MMOL/L (ref 95–110)
CO2 SERPL-SCNC: 31 MMOL/L (ref 23–29)
CREAT SERPL-MCNC: 1.8 MG/DL (ref 0.5–1.4)
GFR SERPLBLD CREATININE-BSD FMLA CKD-EPI: 53 ML/MIN/1.73/M2
GLUCOSE SERPL-MCNC: 73 MG/DL (ref 70–110)
MAGNESIUM SERPL-MCNC: 2.4 MG/DL (ref 1.6–2.6)
PHOSPHATE SERPL-MCNC: 6 MG/DL (ref 2.7–4.5)
POTASSIUM SERPL-SCNC: 2.8 MMOL/L (ref 3.5–5.1)
PROT SERPL-MCNC: 7.8 GM/DL (ref 6–8.4)
SODIUM SERPL-SCNC: 135 MMOL/L (ref 136–145)

## 2025-05-17 PROCEDURE — 63600175 PHARM REV CODE 636 W HCPCS: Performed by: HOSPITALIST

## 2025-05-17 PROCEDURE — 82040 ASSAY OF SERUM ALBUMIN: CPT | Performed by: HOSPITALIST

## 2025-05-17 PROCEDURE — 36415 COLL VENOUS BLD VENIPUNCTURE: CPT | Performed by: HOSPITALIST

## 2025-05-17 PROCEDURE — 25000003 PHARM REV CODE 250: Performed by: HOSPITALIST

## 2025-05-17 PROCEDURE — 83735 ASSAY OF MAGNESIUM: CPT | Performed by: HOSPITALIST

## 2025-05-17 PROCEDURE — 84100 ASSAY OF PHOSPHORUS: CPT | Performed by: HOSPITALIST

## 2025-05-17 PROCEDURE — 20600001 HC STEP DOWN PRIVATE ROOM

## 2025-05-17 RX ORDER — POTASSIUM CHLORIDE 20 MEQ/1
40 TABLET, EXTENDED RELEASE ORAL
Status: ACTIVE | OUTPATIENT
Start: 2025-05-17 | End: 2025-05-17

## 2025-05-17 RX ORDER — TORSEMIDE 100 MG/1
100 TABLET ORAL DAILY
Status: DISCONTINUED | OUTPATIENT
Start: 2025-05-18 | End: 2025-05-18 | Stop reason: HOSPADM

## 2025-05-17 RX ORDER — POTASSIUM CHLORIDE 7.45 MG/ML
10 INJECTION INTRAVENOUS
Status: DISCONTINUED | OUTPATIENT
Start: 2025-05-17 | End: 2025-05-17

## 2025-05-17 RX ADMIN — METOPROLOL SUCCINATE 100 MG: 100 TABLET, EXTENDED RELEASE ORAL at 08:05

## 2025-05-17 RX ADMIN — DAPAGLIFLOZIN 10 MG: 10 TABLET, FILM COATED ORAL at 09:05

## 2025-05-17 RX ADMIN — POTASSIUM CHLORIDE 40 MEQ: 1500 TABLET, EXTENDED RELEASE ORAL at 11:05

## 2025-05-17 RX ADMIN — METOPROLOL SUCCINATE 100 MG: 100 TABLET, EXTENDED RELEASE ORAL at 09:05

## 2025-05-17 RX ADMIN — FUROSEMIDE 80 MG: 10 INJECTION, SOLUTION INTRAVENOUS at 09:05

## 2025-05-17 RX ADMIN — GUANFACINE 1 MG: 1 TABLET ORAL at 08:05

## 2025-05-17 RX ADMIN — POTASSIUM CHLORIDE 10 MEQ: 7.46 INJECTION, SOLUTION INTRAVENOUS at 09:05

## 2025-05-17 RX ADMIN — ENOXAPARIN SODIUM 60 MG: 60 INJECTION SUBCUTANEOUS at 08:05

## 2025-05-17 RX ADMIN — ENOXAPARIN SODIUM 60 MG: 60 INJECTION SUBCUTANEOUS at 09:05

## 2025-05-17 RX ADMIN — POTASSIUM CHLORIDE 10 MEQ: 7.46 INJECTION, SOLUTION INTRAVENOUS at 11:05

## 2025-05-17 RX ADMIN — POTASSIUM CHLORIDE 40 MEQ: 1500 TABLET, EXTENDED RELEASE ORAL at 09:05

## 2025-05-17 NOTE — SUBJECTIVE & OBJECTIVE
Interval History: K 2.8 replacing IV and PO, IV burning and asked to not get final 2/4 doses. Cramping abd pain gone. Some edema of legs. He overall feels near his dry weight.     Review of Systems   All other systems reviewed and are negative.    Objective:     Vital Signs (Most Recent):  Temp: 97.4 °F (36.3 °C) (05/17/25 1130)  Pulse: 97 (05/17/25 1130)  Resp: 18 (05/17/25 1130)  BP: 111/77 (05/17/25 1130)  SpO2: 98 % (05/17/25 1130) Vital Signs (24h Range):  Temp:  [97.4 °F (36.3 °C)-98.3 °F (36.8 °C)] 97.4 °F (36.3 °C)  Pulse:  [73-97] 97  Resp:  [17-19] 18  SpO2:  [96 %-99 %] 98 %  BP: (111-168)/(68-89) 111/77     Weight: (!) 155.2 kg (342 lb 2.5 oz)  Body mass index is 49.54 kg/m².    Intake/Output Summary (Last 24 hours) at 5/17/2025 1223  Last data filed at 5/17/2025 1105  Gross per 24 hour   Intake 940 ml   Output 5175 ml   Net -4235 ml         Physical Exam  Vitals reviewed.               Significant Labs: All pertinent labs within the past 24 hours have been reviewed.    Significant Imaging: I have reviewed all pertinent imaging results/findings within the past 24 hours.

## 2025-05-17 NOTE — SUBJECTIVE & OBJECTIVE
"Interval History: ***    Review of Systems  Objective:     Vital Signs (Most Recent):  Temp: 97.4 °F (36.3 °C) (05/17/25 1130)  Pulse: 97 (05/17/25 1130)  Resp: 18 (05/17/25 1130)  BP: 111/77 (05/17/25 1130)  SpO2: 98 % (05/17/25 1130) Vital Signs (24h Range):  Temp:  [97.4 °F (36.3 °C)-98.3 °F (36.8 °C)] 97.4 °F (36.3 °C)  Pulse:  [73-97] 97  Resp:  [17-19] 18  SpO2:  [96 %-99 %] 98 %  BP: (111-168)/(68-89) 111/77     Weight: (!) 155.2 kg (342 lb 2.5 oz)  Body mass index is 49.54 kg/m².    Intake/Output Summary (Last 24 hours) at 5/17/2025 1315  Last data filed at 5/17/2025 1105  Gross per 24 hour   Intake 940 ml   Output 5175 ml   Net -4235 ml         Physical Exam          Significant Labs: {Results:29735::"All pertinent labs within the past 24 hours have been reviewed."}    Significant Imaging: {Imaging Review:36137::"I have reviewed all pertinent imaging results/findings within the past 24 hours."}  "

## 2025-05-17 NOTE — ASSESSMENT & PLAN NOTE
-admit to cardiac stepdown unit - tele monitoring  -CHF pathway  -cardiology consulted, appreciate  Cont Lasix 80 IV q12 (from gtt at 20), f/u cards, likely can transition to PO soon  Home toprolXL   Resumed Farxiga 5/15  HOLD home aldactone, resume asap when ALMAS improves  HOLD entresto,  resume asap when ALMAS improves  Not giving Home verquvo non-formulary   - strict Is/Os   - daily weights, preferably standing   - fluid restrict 1.5L while inpatient  - tele  - keep Mg >2, Phos >3, K >4

## 2025-05-17 NOTE — ASSESSMENT & PLAN NOTE
Body mass index is 49.54 kg/m². Morbid obesity complicates all aspects of disease management from diagnostic modalities to treatment.

## 2025-05-17 NOTE — PROGRESS NOTES
[facility] - Cardiology [facility] Medicine  Progress Note    Patient Name: Michael Conti  MRN: [id number]  Patient Class: IP- Inpatient   Admission Date: 5/13/2025  Length of Stay: 3 days  Attending Physician: Akua Ontiveros MD  Primary Care Provider: Lele Verde NP (Inactive)        Subjective     Principal Problem:Acute on chronic combined systolic and diastolic heart failure        HPI:  25 y/o AAM w/ Non-Ischemic Cardiomyopathy_HFrEF EF10-15%, s/p AICD, severe obesity, presents to ED with shortness of breath, orthopnea.     He was admitted to [facility] from 5/7-5/11 for management of heart failure exacerbation. Primary Cardiologist is Dr. Frannie García ([facility]) during admission he was treated with IV lasix, IV milrinone,  restarted on Toprol 100mg, Entresto & Aldactone 2 days into admission.  At discharge started on Torsemide 100mg & Verquvo 2.5mg daily.   Farxiga & Bidil were held during inpatient admission.  He reports that as an outpatient Bidil/Entresto/Spironolactone were on hold by his cardiologist.  He reports feeling well the day after discharge and has continued Toprol/Farxiga/Torsemide/Verquvo.   He is unsure if he has had a left heart cath in past.     He reported started feeling dyspnea, orthopnea, WILLIS w/ pre-syncope symptoms on oral regimen, reports his dry weight is 340 but current weight is still high @ 367-370 pounds, came to [facility] today for evaluation due to prior admission here, felt he requires more diuresis.  He has no chest pain complaints, unclear if he has had increase dietary indiscretion.  He is concerned about how long he will be admitted to ensure adequate diuresis.     Given lasix 80mg iV in ED and has had 7222-6646 cc of UOP.      Overview/Hospital Course:  UOP poor on lasix drip 10 mg/hr. Increase to 30 mg/hr + metolazone.     Interval History: diuresing well, ongoing cramps and hypoK, CVP 3 in pm so Lasix gtt changed to Lasix 80 IV q12 per cards    Review of Systems    All other systems reviewed and are negative.    Objective:     Vital Signs (Most Recent):  Temp: 97.8 °F (36.6 °C) (05/16/25 0709)  Pulse: 74 (05/16/25 0709)  Resp: 19 (05/16/25 0709)  BP: 126/80 (05/16/25 0709)  SpO2: 95 % (05/16/25 0709) Vital Signs (24h Range):  Temp:  [97.8 °F (36.6 °C)-97.9 °F (36.6 °C)] 97.8 °F (36.6 °C)  Pulse:  [] 74  Resp:  [18-20] 19  SpO2:  [93 %-99 %] 95 %  BP: (116-138)/(71-86) 126/80     Weight: (!) 155.2 kg (342 lb 2.5 oz)  Body mass index is 49.54 kg/m².    Intake/Output Summary (Last 24 hours) at 5/16/2025 1012  Last data filed at 5/16/2025 1005  Gross per 24 hour   Intake 2713 ml   Output 76045 ml   Net -8567 ml         Physical Exam  Vitals reviewed.               Significant Labs: All pertinent labs within the past 24 hours have been reviewed.    Significant Imaging: I have reviewed all pertinent imaging results/findings within the past 24 hours.      Assessment & Plan  Acute on chronic combined systolic and diastolic heart failure  Non-ischemic cardiomyopathy  -admit to cardiac stepdown unit - tele monitoring  -CHF pathway  -cardiology consulted, appreciate  Lasix 80 IV q12 (from gtt at 20)  Home toprolXL   Resumed Farxiga 5/15  HOLD home aldactone, resume asap when ALMAS improves  HOLD entresto,  resume asap when ALMAS improves  Not giving Home verquvo non-formulary   - strict Is/Os   - daily weights, preferably standing   - fluid restrict 1.5L while inpatient  - tele  - keep Mg >2, Phos >3, K >4  Elevated serum creatinine  Cardiorenal  Diurese  - avoid nephrotoxic meds  - renally-dose all meds  - strict Is/Os  Severe obesity (BMI >= 40)  Body mass index is 49.54 kg/m². Morbid obesity complicates all aspects of disease management from diagnostic modalities to treatment.   Primary hypertension  Management as per primary problem.   Microcytic anemia  - f/u labs  Fall  Pt denies presyncope, states it was a mechanical fall  ADHD (attention deficit hyperactivity disorder)  Psych  consulted, appreciate recs  - guanfacine 1mg PO qhs started 5/15 pm    VTE Risk Mitigation (From admission, onward)           Ordered     enoxaparin injection 60 mg  Every 12 hours         05/13/25 2048     IP VTE HIGH RISK PATIENT  Once         05/13/25 2048     Place sequential compression device  Until discontinued         05/13/25 2048                    Discharge Planning   YU: 5/19/2025     Code Status: Full Code   Medical Readiness for Discharge Date:   Discharge Plan A: Home with family                Akua Ontiveros MD  Department of Hospital Medicine   [facility] - Cardiology Stepdown

## 2025-05-17 NOTE — PROGRESS NOTES
[facility]ia - Cardiology [facility] Medicine  Progress Note    Patient Name: Michael Conti  MRN: [id number]  Patient Class: IP- Inpatient   Admission Date: 5/13/2025  Length of Stay: 4 days  Attending Physician: Akua Ontiveros MD  Primary Care Provider: Lele Verde NP (Inactive)        Subjective     Principal Problem:Acute on chronic combined systolic and diastolic heart failure        HPI:  25 y/o AAM w/ Non-Ischemic Cardiomyopathy_HFrEF EF10-15%, s/p AICD, severe obesity, presents to ED with shortness of breath, orthopnea.     He was admitted to [facility] from 5/7-5/11 for management of heart failure exacerbation. Primary Cardiologist is Dr. Frannie García ([facility]) during admission he was treated with IV lasix, IV milrinone,  restarted on Toprol 100mg, Entresto & Aldactone 2 days into admission.  At discharge started on Torsemide 100mg & Verquvo 2.5mg daily.   Farxiga & Bidil were held during inpatient admission.  He reports that as an outpatient Bidil/Entresto/Spironolactone were on hold by his cardiologist.  He reports feeling well the day after discharge and has continued Toprol/Farxiga/Torsemide/Verquvo.   He is unsure if he has had a left heart cath in past.     He reported started feeling dyspnea, orthopnea, WILLIS w/ pre-syncope symptoms on oral regimen, reports his dry weight is 340 but current weight is still high @ 367-370 pounds, came to [facility] today for evaluation due to prior admission here, felt he requires more diuresis.  He has no chest pain complaints, unclear if he has had increase dietary indiscretion.  He is concerned about how long he will be admitted to ensure adequate diuresis.     Given lasix 80mg iV in ED and has had 1630-9065 cc of UOP.      Overview/Hospital Course:  UOP poor on lasix drip 10 mg/hr. Increase to 30 mg/hr + metolazone.     Interval History: K 2.8 replacing IV and PO, IV burning and asked to not get final 2/4 doses. Cramping abd pain gone. Some edema of legs. He  overall feels near his dry weight.     Review of Systems   All other systems reviewed and are negative.    Objective:     Vital Signs (Most Recent):  Temp: 97.4 °F (36.3 °C) (05/17/25 1130)  Pulse: 97 (05/17/25 1130)  Resp: 18 (05/17/25 1130)  BP: 111/77 (05/17/25 1130)  SpO2: 98 % (05/17/25 1130) Vital Signs (24h Range):  Temp:  [97.4 °F (36.3 °C)-98.3 °F (36.8 °C)] 97.4 °F (36.3 °C)  Pulse:  [73-97] 97  Resp:  [17-19] 18  SpO2:  [96 %-99 %] 98 %  BP: (111-168)/(68-89) 111/77     Weight: (!) 155.2 kg (342 lb 2.5 oz)  Body mass index is 49.54 kg/m².    Intake/Output Summary (Last 24 hours) at 5/17/2025 1223  Last data filed at 5/17/2025 1105  Gross per 24 hour   Intake 940 ml   Output 5175 ml   Net -4235 ml         Physical Exam  Vitals reviewed.               Significant Labs: All pertinent labs within the past 24 hours have been reviewed.    Significant Imaging: I have reviewed all pertinent imaging results/findings within the past 24 hours.      Assessment & Plan  Acute on chronic combined systolic and diastolic heart failure  Non-ischemic cardiomyopathy  -admit to cardiac stepdown unit - tele monitoring  -CHF pathway  -cardiology consulted, appreciate  Cont Lasix 80 IV q12 (from gtt at 20), f/u cards, likely can transition to PO soon  Home toprolXL   Resumed Farxiga 5/15  HOLD home aldactone, resume asap when ALMAS improves  HOLD entresto,  resume asap when ALMAS improves  Not giving Home verquvo non-formulary   - strict Is/Os   - daily weights, preferably standing   - fluid restrict 1.5L while inpatient  - tele  - keep Mg >2, Phos >3, K >4  Elevated serum creatinine  Cardiorenal  Diurese  - avoid nephrotoxic meds  - renally-dose all meds  - strict Is/Os  Severe obesity (BMI >= 40)  Body mass index is 49.54 kg/m². Morbid obesity complicates all aspects of disease management from diagnostic modalities to treatment.   Primary hypertension  Management as per primary problem.   Microcytic anemia  - f/u labs  Fall  Pt  denies presyncope, states it was a mechanical fall  ADHD (attention deficit hyperactivity disorder)  Psych consulted, appreciate recs  - guanfacine 1mg PO qhs started 5/15 pm    VTE Risk Mitigation (From admission, onward)           Ordered     enoxaparin injection 60 mg  Every 12 hours         05/13/25 2048     IP VTE HIGH RISK PATIENT  Once         05/13/25 2048     Place sequential compression device  Until discontinued         05/13/25 2048                    Discharge Planning   YU: 5/19/2025     Code Status: Full Code   Medical Readiness for Discharge Date:   Discharge Plan A: Home with family                Please place Justification for DME        Akua Ontiveros MD  Department of Hospital Medicine   [facility] - Cardiology Stepdown

## 2025-05-17 NOTE — ASSESSMENT & PLAN NOTE
-admit to cardiac stepdown unit - tele monitoring  -CHF pathway  -cardiology consulted, appreciate  Lasix 80 IV q12 (from gtt at 20)  Home toprolXL   Resumed Farxiga 5/15  HOLD home aldactone, resume asap when ALMAS improves  HOLD entresto,  resume asap when ALMAS improves  Not giving Home verquvo non-formulary   - strict Is/Os   - daily weights, preferably standing   - fluid restrict 1.5L while inpatient  - tele  - keep Mg >2, Phos >3, K >4

## 2025-05-18 VITALS
OXYGEN SATURATION: 96 % | SYSTOLIC BLOOD PRESSURE: 118 MMHG | BODY MASS INDEX: 45.1 KG/M2 | RESPIRATION RATE: 17 BRPM | HEART RATE: 86 BPM | WEIGHT: 315 LBS | HEIGHT: 70 IN | DIASTOLIC BLOOD PRESSURE: 72 MMHG | TEMPERATURE: 98 F

## 2025-05-18 LAB
ANION GAP (OHS): 15 MMOL/L (ref 8–16)
BUN SERPL-MCNC: 54 MG/DL (ref 6–20)
CALCIUM SERPL-MCNC: 9.7 MG/DL (ref 8.7–10.5)
CHLORIDE SERPL-SCNC: 91 MMOL/L (ref 95–110)
CO2 SERPL-SCNC: 32 MMOL/L (ref 23–29)
CREAT SERPL-MCNC: 1.8 MG/DL (ref 0.5–1.4)
GFR SERPLBLD CREATININE-BSD FMLA CKD-EPI: 53 ML/MIN/1.73/M2
GLUCOSE SERPL-MCNC: 68 MG/DL (ref 70–110)
MAGNESIUM SERPL-MCNC: 2.5 MG/DL (ref 1.6–2.6)
PHOSPHATE SERPL-MCNC: 4.4 MG/DL (ref 2.7–4.5)
POTASSIUM SERPL-SCNC: 3.2 MMOL/L (ref 3.5–5.1)
SODIUM SERPL-SCNC: 138 MMOL/L (ref 136–145)

## 2025-05-18 PROCEDURE — 25000003 PHARM REV CODE 250: Performed by: HOSPITALIST

## 2025-05-18 PROCEDURE — 84100 ASSAY OF PHOSPHORUS: CPT | Performed by: HOSPITALIST

## 2025-05-18 PROCEDURE — 63600175 PHARM REV CODE 636 W HCPCS: Performed by: HOSPITALIST

## 2025-05-18 PROCEDURE — 80048 BASIC METABOLIC PNL TOTAL CA: CPT | Performed by: HOSPITALIST

## 2025-05-18 PROCEDURE — 36415 COLL VENOUS BLD VENIPUNCTURE: CPT | Performed by: HOSPITALIST

## 2025-05-18 PROCEDURE — 83735 ASSAY OF MAGNESIUM: CPT | Performed by: HOSPITALIST

## 2025-05-18 RX ORDER — GUANFACINE 1 MG/1
1 TABLET ORAL NIGHTLY
Qty: 90 TABLET | Refills: 3 | Status: SHIPPED | OUTPATIENT
Start: 2025-05-18 | End: 2026-05-18

## 2025-05-18 RX ORDER — POTASSIUM CHLORIDE 20 MEQ/1
40 TABLET, EXTENDED RELEASE ORAL ONCE
Status: COMPLETED | OUTPATIENT
Start: 2025-05-18 | End: 2025-05-18

## 2025-05-18 RX ORDER — TORSEMIDE 100 MG/1
TABLET ORAL
Qty: 120 TABLET | Refills: 3 | Status: SHIPPED | OUTPATIENT
Start: 2025-05-18

## 2025-05-18 RX ADMIN — TORSEMIDE 100 MG: 100 TABLET ORAL at 08:05

## 2025-05-18 RX ADMIN — ENOXAPARIN SODIUM 60 MG: 60 INJECTION SUBCUTANEOUS at 08:05

## 2025-05-18 RX ADMIN — METOPROLOL SUCCINATE 100 MG: 100 TABLET, EXTENDED RELEASE ORAL at 08:05

## 2025-05-18 RX ADMIN — POTASSIUM CHLORIDE 40 MEQ: 1500 TABLET, EXTENDED RELEASE ORAL at 02:05

## 2025-05-18 RX ADMIN — DAPAGLIFLOZIN 10 MG: 10 TABLET, FILM COATED ORAL at 08:05

## 2025-05-18 NOTE — NURSING
Patient is ready for discharge. Patient stable alert and oriented X4. IVs removed. No complaints of chest pain, sob, palpitations, dizziness, lightheadedness, headache, or n/v. Discussed discharge plan. Reviewed AVS; medications and side effects, appointments, and answered questions with patient. RX delivered to patient at bedside by pharmacy.

## 2025-05-18 NOTE — PLAN OF CARE
[facility] - Cardiology Stepdown  Discharge Final Note    Primary Care Provider: Lele Verde NP (Inactive)    Expected Discharge Date: 5/18/2025    Final Discharge Note (most recent)       Final Note - 05/18/25 1655          Final Note    Assessment Type Final Discharge Note (P)      Anticipated Discharge Disposition Home or Self Care (P)      What phone number can be called within the next 1-3 days to see how you are doing after discharge? [phone] (P)      Hospital Resources/Appts/Education Provided Provided patient/caregiver with written discharge plan information;Provided education on problems/symptoms using teachback;Appointments scheduled and added to AVS (P)         Post-Acute Status    Post-Acute Authorization Other (P)      Other Status No Post-Acute Service Needs (P)      Discharge Delays None known at this time (P)                      Important Message from Medicare             Pt. discharged home with Self-Care. SW arranging pt's transportation home to [place] via [facility].     Gurwinder Rowe LMSW

## 2025-05-18 NOTE — PLAN OF CARE
AAOX4,VSS,O2 sats 99% on room air.Plan of care discussed with patient, patient being discharged. Patient has no complaints of chest pain/SOB, palpitations, dizziness, lightheadedness, headache, or n/v. Discussed medications and care.Patient switched from IVP Lasix to 100 mg p.o. Torsemide. Patient has no questions at this time.Pt visualised and stable, with no acute distress noted.Call light within reach.Pt resting,in recliner chair, with wheels locked. No family by the bedside.Will continue to monitor through the shift, plan of care ongoing.      Problem: Adult Inpatient Plan of Care  Goal: Plan of Care Review  5/18/2025 1341 by Aminah Howard RN  Outcome: Met  5/18/2025 1341 by Aminah Howard RN  Outcome: Progressing  Goal: Patient-Specific Goal (Individualized)  Outcome: Met  Goal: Absence of Hospital-Acquired Illness or Injury  Outcome: Met  Goal: Optimal Comfort and Wellbeing  Outcome: Met  Goal: Readiness for Transition of Care  Outcome: Met     Problem: Bariatric Environmental Safety  Goal: Safety Maintained with Care  Outcome: Met     Problem: Infection  Goal: Absence of Infection Signs and Symptoms  Outcome: Met     Problem: Wound  Goal: Optimal Coping  Outcome: Met  Goal: Optimal Functional Ability  Outcome: Met  Goal: Absence of Infection Signs and Symptoms  Outcome: Met  Goal: Improved Oral Intake  Outcome: Met  Goal: Optimal Pain Control and Function  Outcome: Met  Goal: Skin Health and Integrity  Outcome: Met  Goal: Optimal Wound Healing  Outcome: Met     Problem: Fall Injury Risk  Goal: Absence of Fall and Fall-Related Injury  Outcome: Met

## 2025-05-18 NOTE — PLAN OF CARE
Pt maintained free from falls/trauma/injuries and skin breakdown. Pt denied pain or discomfort. Plan of care reviewed. Pt verbalized understanding. All questions and concerns addressed.       Problem: Adult Inpatient Plan of Care  Goal: Plan of Care Review  Outcome: Progressing  Goal: Patient-Specific Goal (Individualized)  Outcome: Progressing  Goal: Optimal Comfort and Wellbeing  Outcome: Progressing     Problem: Infection  Goal: Absence of Infection Signs and Symptoms  Outcome: Progressing